# Patient Record
Sex: FEMALE | Race: WHITE | NOT HISPANIC OR LATINO | Employment: PART TIME | URBAN - METROPOLITAN AREA
[De-identification: names, ages, dates, MRNs, and addresses within clinical notes are randomized per-mention and may not be internally consistent; named-entity substitution may affect disease eponyms.]

---

## 2017-04-19 RX ORDER — HYDROCHLOROTHIAZIDE 12.5 MG/1
12.5 CAPSULE, GELATIN COATED ORAL DAILY
Qty: 90 CAPSULE | Refills: 0 | Status: SHIPPED | OUTPATIENT
Start: 2017-04-19 | End: 2017-10-29

## 2017-10-24 DIAGNOSIS — I10 ESSENTIAL HYPERTENSION: ICD-10-CM

## 2017-10-26 ENCOUNTER — TELEPHONE (OUTPATIENT)
Dept: FAMILY MEDICINE CLINIC | Facility: CLINIC | Age: 62
End: 2017-10-26

## 2017-10-26 RX ORDER — LISINOPRIL 20 MG/1
TABLET ORAL
Qty: 90 TABLET | Refills: 0 | OUTPATIENT
Start: 2017-10-26

## 2017-10-26 RX ORDER — HYDROCHLOROTHIAZIDE 12.5 MG/1
TABLET ORAL
Qty: 90 TABLET | Refills: 0 | OUTPATIENT
Start: 2017-10-26

## 2017-10-26 NOTE — TELEPHONE ENCOUNTER
----- Message from Jesus Farr MD sent at 10/26/2017 10:46 AM EDT -----  Contact: [phone]  RTC fasting     ----- Message -----     From: Whitney Avila MA     Sent: 10/26/2017   8:37 AM       To: Jesus Farr MD    Please advise  ----- Message -----     From: Tamika Millard     Sent: 10/25/2017   4:49 PM       To: Whitney Avila MA    LISINOPRIL,HYDROCLORITHYZIDE 90 DAYS REFILLS. LAST REFILLED AT [facility] AND JUST RAN OUT OF REFILLS         RA TC

## 2017-10-29 ENCOUNTER — OFFICE VISIT (OUTPATIENT)
Dept: FAMILY MEDICINE CLINIC | Facility: CLINIC | Age: 62
End: 2017-10-29

## 2017-10-29 VITALS
HEIGHT: 64 IN | WEIGHT: 189 LBS | BODY MASS INDEX: 32.27 KG/M2 | OXYGEN SATURATION: 98 % | HEART RATE: 91 BPM | TEMPERATURE: 98.4 F | RESPIRATION RATE: 20 BRPM | DIASTOLIC BLOOD PRESSURE: 78 MMHG | SYSTOLIC BLOOD PRESSURE: 144 MMHG

## 2017-10-29 DIAGNOSIS — Z12.31 SCREENING MAMMOGRAM, ENCOUNTER FOR: ICD-10-CM

## 2017-10-29 DIAGNOSIS — Z98.890 HISTORY OF KIDNEY SURGERY: ICD-10-CM

## 2017-10-29 DIAGNOSIS — Z23 INFLUENZA VACCINE NEEDED: ICD-10-CM

## 2017-10-29 DIAGNOSIS — I10 ESSENTIAL HYPERTENSION: ICD-10-CM

## 2017-10-29 DIAGNOSIS — F32.A DEPRESSION, UNSPECIFIED DEPRESSION TYPE: Primary | ICD-10-CM

## 2017-10-29 DIAGNOSIS — Z11.59 NEED FOR HEPATITIS C SCREENING TEST: ICD-10-CM

## 2017-10-29 LAB
ALBUMIN SERPL-MCNC: 4.4 G/DL (ref 3.2–4.8)
ALBUMIN/GLOB SERPL: 1.9 G/DL (ref 1.5–2.5)
ALP SERPL-CCNC: 86 U/L (ref 25–100)
ALT SERPL W P-5'-P-CCNC: 21 U/L (ref 7–40)
ANION GAP SERPL CALCULATED.3IONS-SCNC: 5 MMOL/L (ref 3–11)
AST SERPL-CCNC: 16 U/L (ref 0–33)
BASOPHILS # BLD AUTO: 0.03 10*3/MM3 (ref 0–0.2)
BASOPHILS NFR BLD AUTO: 0.4 % (ref 0–1)
BILIRUB BLD-MCNC: NEGATIVE MG/DL
BILIRUB SERPL-MCNC: 0.3 MG/DL (ref 0.3–1.2)
BUN BLD-MCNC: 16 MG/DL (ref 9–23)
BUN/CREAT SERPL: 16 (ref 7–25)
CALCIUM SPEC-SCNC: 9 MG/DL (ref 8.7–10.4)
CHLORIDE SERPL-SCNC: 110 MMOL/L (ref 99–109)
CLARITY, POC: CLEAR
CO2 SERPL-SCNC: 29 MMOL/L (ref 20–31)
COLOR UR: YELLOW
CREAT BLD-MCNC: 1 MG/DL (ref 0.6–1.3)
DEPRECATED RDW RBC AUTO: 43.7 FL (ref 37–54)
EOSINOPHIL # BLD AUTO: 0.12 10*3/MM3 (ref 0–0.3)
EOSINOPHIL NFR BLD AUTO: 1.8 % (ref 0–3)
ERYTHROCYTE [DISTWIDTH] IN BLOOD BY AUTOMATED COUNT: 13.2 % (ref 11.3–14.5)
EXPIRATION DATE: NORMAL
GFR SERPL CREATININE-BSD FRML MDRD: 56 ML/MIN/1.73
GLOBULIN UR ELPH-MCNC: 2.3 GM/DL
GLUCOSE BLD-MCNC: 124 MG/DL (ref 70–100)
GLUCOSE UR STRIP-MCNC: NEGATIVE MG/DL
HBA1C MFR BLD: 5.9 % (ref 4.8–5.6)
HCT VFR BLD AUTO: 38.4 % (ref 34.5–44)
HCV AB SER DONR QL: NORMAL
HGB BLD-MCNC: 12.7 G/DL (ref 11.5–15.5)
IMM GRANULOCYTES # BLD: 0.04 10*3/MM3 (ref 0–0.03)
IMM GRANULOCYTES NFR BLD: 0.6 % (ref 0–0.6)
KETONES UR QL: NEGATIVE
LEUKOCYTE EST, POC: NEGATIVE
LYMPHOCYTES # BLD AUTO: 1.36 10*3/MM3 (ref 0.6–4.8)
LYMPHOCYTES NFR BLD AUTO: 20.1 % (ref 24–44)
Lab: NORMAL
MCH RBC QN AUTO: 30.2 PG (ref 27–31)
MCHC RBC AUTO-ENTMCNC: 33.1 G/DL (ref 32–36)
MCV RBC AUTO: 91.2 FL (ref 80–99)
MONOCYTES # BLD AUTO: 0.51 10*3/MM3 (ref 0–1)
MONOCYTES NFR BLD AUTO: 7.5 % (ref 0–12)
NEUTROPHILS # BLD AUTO: 4.7 10*3/MM3 (ref 1.5–8.3)
NEUTROPHILS NFR BLD AUTO: 69.6 % (ref 41–71)
NITRITE UR-MCNC: NEGATIVE MG/ML
PH UR: 5.5 [PH] (ref 5–8)
PLATELET # BLD AUTO: 217 10*3/MM3 (ref 150–450)
PMV BLD AUTO: 10.9 FL (ref 6–12)
POTASSIUM BLD-SCNC: 4.3 MMOL/L (ref 3.5–5.5)
PROT SERPL-MCNC: 6.7 G/DL (ref 5.7–8.2)
PROT UR STRIP-MCNC: NEGATIVE MG/DL
RBC # BLD AUTO: 4.21 10*6/MM3 (ref 3.89–5.14)
RBC # UR STRIP: NEGATIVE /UL
SODIUM BLD-SCNC: 144 MMOL/L (ref 132–146)
SP GR UR: 1.01 (ref 1–1.03)
TSH SERPL DL<=0.05 MIU/L-ACNC: 2.43 MIU/ML (ref 0.35–5.35)
UROBILINOGEN UR QL: NORMAL
WBC NRBC COR # BLD: 6.76 10*3/MM3 (ref 3.5–10.8)

## 2017-10-29 PROCEDURE — 83036 HEMOGLOBIN GLYCOSYLATED A1C: CPT | Performed by: NURSE PRACTITIONER

## 2017-10-29 PROCEDURE — 90471 IMMUNIZATION ADMIN: CPT | Performed by: NURSE PRACTITIONER

## 2017-10-29 PROCEDURE — 80053 COMPREHEN METABOLIC PANEL: CPT | Performed by: NURSE PRACTITIONER

## 2017-10-29 PROCEDURE — 85025 COMPLETE CBC W/AUTO DIFF WBC: CPT | Performed by: NURSE PRACTITIONER

## 2017-10-29 PROCEDURE — 84443 ASSAY THYROID STIM HORMONE: CPT | Performed by: NURSE PRACTITIONER

## 2017-10-29 PROCEDURE — 86803 HEPATITIS C AB TEST: CPT | Performed by: NURSE PRACTITIONER

## 2017-10-29 PROCEDURE — 36415 COLL VENOUS BLD VENIPUNCTURE: CPT | Performed by: NURSE PRACTITIONER

## 2017-10-29 PROCEDURE — 99213 OFFICE O/P EST LOW 20 MIN: CPT | Performed by: NURSE PRACTITIONER

## 2017-10-29 PROCEDURE — 90686 IIV4 VACC NO PRSV 0.5 ML IM: CPT | Performed by: NURSE PRACTITIONER

## 2017-10-29 PROCEDURE — 81003 URINALYSIS AUTO W/O SCOPE: CPT | Performed by: NURSE PRACTITIONER

## 2017-10-29 RX ORDER — LISINOPRIL AND HYDROCHLOROTHIAZIDE 20; 12.5 MG/1; MG/1
1 TABLET ORAL DAILY
Qty: 90 TABLET | Refills: 2 | Status: SHIPPED | OUTPATIENT
Start: 2017-10-29 | End: 2017-11-29 | Stop reason: SDUPTHER

## 2017-10-29 NOTE — PROGRESS NOTES
Subjective   Lobito Mendoza is a 61 y.o. female.   Chief Complaint   Patient presents with   • Hypertension   • Med Refill     hydrochlorothiazide and lisinopril      History of Present Illness Need blood pressure medication refilled. She reports she has not seen a physician in a while.   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     The following portions of the patient's history were reviewed and updated as appropriate: allergies, current medications, past family history, past medical history, past social history, past surgical history and problem list.    Review of Systems   HENT: Positive for sore throat.    Eyes: Negative.         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    Cardiovascular: Negative.    Gastrointestinal: Positive for diarrhea. Negative for abdominal distention, abdominal pain, blood in stool, constipation, nausea and vomiting.        Gastric reflux taking 1/4 tablet of zantac 4 times a day.   If I take too much seems like I have diarrhea.      Endocrine: Negative.    Genitourinary: Negative.    Musculoskeletal: Positive for arthralgias. Negative for joint swelling and myalgias.        Right knee aches    Skin: Negative.    Allergic/Immunologic: Negative.    Neurological: Negative.    Hematological: Negative.    Psychiatric/Behavioral: Negative.        Objective   No Known Allergies    Physical Exam   Constitutional: She is oriented to person, place, and time. Vital signs are normal. She appears well-developed and well-nourished. She is cooperative. She does not appear ill.   HENT:   Head: Normocephalic.   Nose: Nose normal. Right sinus exhibits no maxillary sinus tenderness and no frontal sinus tenderness. Left sinus exhibits no maxillary sinus tenderness and no frontal sinus tenderness.   Mouth/Throat: Uvula is midline, oropharynx is clear and moist and mucous membranes are normal.   Eyes: Conjunctivae, EOM  "and lids are normal. Pupils are equal, round, and reactive to light. No scleral icterus.   Neck: Trachea normal and normal range of motion. Carotid bruit is not present. No thyroid mass and no thyromegaly present.   Cardiovascular: Normal rate, regular rhythm, S1 normal, S2 normal and normal heart sounds.    Pulses:       Carotid pulses are 2+ on the right side, and 2+ on the left side.       Radial pulses are 2+ on the right side, and 2+ on the left side.        Dorsalis pedis pulses are 2+ on the right side, and 2+ on the left side.        Posterior tibial pulses are 2+ on the right side, and 2+ on the left side.   Pulmonary/Chest: Effort normal and breath sounds normal.   Abdominal: Soft. Bowel sounds are normal. She exhibits no distension and no mass. There is no tenderness. There is no rebound and no guarding. No hernia.   Musculoskeletal: Normal range of motion.   Neurological: She is alert and oriented to person, place, and time.   Skin: Skin is warm, dry and intact.   Psychiatric: She has a normal mood and affect. Her behavior is normal. Judgment and thought content normal.     Visit Vitals   • /78 (BP Location: Right arm, Patient Position: Sitting, Cuff Size: Adult)   • Pulse 91   • Temp 98.4 °F (36.9 °C) (Oral)   • Resp 20   • Ht 64\" (162.6 cm)   • Wt 189 lb (85.7 kg)   • SpO2 98%   • BMI 32.44 kg/m2       Assessment/Plan   Lobito was seen today for hypertension and med refill.    Diagnoses and all orders for this visit:    Depression, unspecified depression type  -     CBC & Differential  -     POC Urinalysis Dipstick, Automated  -     TSH  -     Hemoglobin A1c  -     CBC Auto Differential    Essential hypertension  -     lisinopril-hydrochlorothiazide (ZESTORETIC) 20-12.5 MG per tablet; Take 1 tablet by mouth Daily for 90 days.  -     Comprehensive Metabolic Panel    Need for hepatitis C screening test  -     Hepatitis C Antibody    Influenza vaccine needed  -     Flu Vaccine Quad PF 3YR+ " (FLUARIX/FLUZONE [id number])    History of kidney surgery  -     Ambulatory Referral to Nephrology    Screening mammogram, encounter for  -     Mammo Screening Digital Tomosynthesis Bilateral With CAD; Future        See patient instructions.   Follow up in 4 weeks to review labs and as needed.                Cynthia Garzon, APRN

## 2017-10-29 NOTE — PATIENT INSTRUCTIONS
"Influenza (Flu) Vaccine (Inactivated or Recombinant):   1. Why get vaccinated?  Influenza (\"flu\") is a contagious disease that spreads around the [place] every year, usually between October and May.  Flu is caused by influenza viruses, and is spread mainly by coughing, sneezing, and close contact.  Anyone can get flu. Flu strikes suddenly and can last several days. Symptoms vary by age, but can include:  · fever/chills  · sore throat  · muscle aches  · fatigue  · cough  · headache  · runny or stuffy nose  Flu can also lead to pneumonia and blood infections, and cause diarrhea and seizures in children. If you have a medical condition, such as heart or lung disease, flu can make it worse.  Flu is more dangerous for some people. Infants and young children, people 65 years of age and older, pregnant women, and people with certain health conditions or a weakened immune system are at greatest risk.  Each year thousands of people in the [place] die from flu, and many more are hospitalized.  Flu vaccine can:  · keep you from getting flu,  · make flu less severe if you do get it, and  · keep you from spreading flu to your family and other people.  2. Inactivated and recombinant flu vaccines  A dose of flu vaccine is recommended every flu season. Children 6 months through 8 years of age may need two doses during the same flu season. Everyone else needs only one dose each flu season.  Some inactivated flu vaccines contain a very small amount of a mercury-based preservative called thimerosal. Studies have not shown thimerosal in vaccines to be harmful, but flu vaccines that do not contain thimerosal are available.  There is no live flu virus in flu shots. They cannot cause the flu.  There are many flu viruses, and they are always changing. Each year a new flu vaccine is made to protect against three or four viruses that are likely to cause disease in the upcoming flu season. But even when the vaccine doesn't exactly " match these viruses, it may still provide some protection.  Flu vaccine cannot prevent:  · flu that is caused by a virus not covered by the vaccine, or  · illnesses that look like flu but are not.  It takes about 2 weeks for protection to develop after vaccination, and protection lasts through the flu season.  3. Some people should not get this vaccine  Tell the person who is giving you the vaccine:  · If you have any severe, life-threatening allergies. If you ever had a life-threatening allergic reaction after a dose of flu vaccine, or have a severe allergy to any part of this vaccine, you may be advised not to get vaccinated. Most, but not all, types of flu vaccine contain a small amount of egg protein.  · If you ever had Guillain-[place] Syndrome (also called GBS). Some people with a history of GBS should not get this vaccine. This should be discussed with your doctor.  · If you are not feeling well. It is usually okay to get flu vaccine when you have a mild illness, but you might be asked to come back when you feel better.  4. Risks of a vaccine reaction  With any medicine, including vaccines, there is a chance of reactions. These are usually mild and go away on their own, but serious reactions are also possible.  Most people who get a flu shot do not have any problems with it.  Minor problems following a flu shot include:  · soreness, redness, or swelling where the shot was given  · hoarseness  · sore, red or itchy eyes  · cough  · fever  · aches  · headache  · itching  · fatigue  If these problems occur, they usually begin soon after the shot and last 1 or 2 days.  More serious problems following a flu shot can include the following:  · There may be a small increased risk of Guillain-[place] Syndrome (GBS) after inactivated flu vaccine. This risk has been estimated at 1 or 2 additional cases per million people vaccinated. This is much lower than the risk of severe complications from flu, which can be prevented by  flu vaccine.  · Young children who get the flu shot along with pneumococcal vaccine (PCV13) and/or DTaP vaccine at the same time might be slightly more likely to have a seizure caused by fever. Ask your doctor for more information. Tell your doctor if a child who is getting flu vaccine has ever had a seizure.  Problems that could happen after any injected vaccine:  · People sometimes faint after a medical procedure, including vaccination. Sitting or lying down for about 15 minutes can help prevent fainting, and injuries caused by a fall. Tell your doctor if you feel dizzy, or have vision changes or ringing in the ears.  · Some people get severe pain in the shoulder and have difficulty moving the arm where a shot was given. This happens very rarely.  · Any medication can cause a severe allergic reaction. Such reactions from a vaccine are very rare, estimated at about 1 in a million doses, and would happen within a few minutes to a few hours after the vaccination.  As with any medicine, there is a very remote chance of a vaccine causing a serious injury or death.  The safety of vaccines is always being monitored. For more information, visit: www.cdc.gov/vaccinesafety/  5. What if there is a serious reaction?  What should I look for?  · Look for anything that concerns you, such as signs of a severe allergic reaction, very high fever, or unusual behavior.  Signs of a severe allergic reaction can include hives, swelling of the face and throat, difficulty breathing, a fast heartbeat, dizziness, and weakness. These would start a few minutes to a few hours after the vaccination.  What should I do?  · If you think it is a severe allergic reaction or other emergency that can't wait, call 9-1-1 and get the person to the nearest hospital. Otherwise, call your doctor.  · Reactions should be reported to the Vaccine Adverse Event Reporting System (VAERS). Your doctor should file this report, or you can do it yourself through the  VAERS web site at www.vaers.[facility].gov, or by calling 1-469.452.2288.  VAERS does not give medical advice.  6. The National Vaccine Injury Compensation Program  The National Vaccine Injury Compensation Program (VICP) is a federal program that was created to compensate people who may have been injured by certain vaccines.  Persons who believe they may have been injured by a vaccine can learn about the program and about filing a claim by calling 1-204.115.8921 or visiting the VICP website at www.[facility].gov/vaccinecompensation. There is a time limit to file a claim for compensation.  7. How can I learn more?  · Ask your healthcare provider. He or she can give you the vaccine package insert or suggest other sources of information.  · Call your local or state health department.  · Contact the Centers for Disease Control and Prevention (CDC):    Call 1-163.176.2102 (7-896-WYV-INFO) or    Visit CDC's website at www.cdc.gov/flu  Vaccine Information Statement Inactivated Influenza Vaccine (08/07/2015)     This information is not intended to replace advice given to you by your health care provider. Make sure you discuss any questions you have with your health care provider.     Document Released: 10/12/2007 Document Revised: 01/08/2016 Document Reviewed: 08/10/2015  [employer] Interactive Patient Education ©2017 [employer]

## 2017-11-29 ENCOUNTER — OFFICE VISIT (OUTPATIENT)
Dept: FAMILY MEDICINE CLINIC | Facility: CLINIC | Age: 62
End: 2017-11-29

## 2017-11-29 VITALS
HEIGHT: 64 IN | WEIGHT: 186 LBS | SYSTOLIC BLOOD PRESSURE: 120 MMHG | BODY MASS INDEX: 31.76 KG/M2 | RESPIRATION RATE: 20 BRPM | OXYGEN SATURATION: 96 % | DIASTOLIC BLOOD PRESSURE: 80 MMHG | TEMPERATURE: 98.8 F | HEART RATE: 81 BPM

## 2017-11-29 DIAGNOSIS — Z13.220 SCREENING, LIPID: Primary | ICD-10-CM

## 2017-11-29 DIAGNOSIS — I10 ESSENTIAL HYPERTENSION: ICD-10-CM

## 2017-11-29 DIAGNOSIS — Z76.0 MEDICATION REFILL: ICD-10-CM

## 2017-11-29 DIAGNOSIS — F32.A DEPRESSION, UNSPECIFIED DEPRESSION TYPE: ICD-10-CM

## 2017-11-29 DIAGNOSIS — N89.8 VAGINAL DISCHARGE: ICD-10-CM

## 2017-11-29 DIAGNOSIS — Z12.4 PAP SMEAR FOR CERVICAL CANCER SCREENING: ICD-10-CM

## 2017-11-29 PROCEDURE — 99214 OFFICE O/P EST MOD 30 MIN: CPT | Performed by: NURSE PRACTITIONER

## 2017-11-29 RX ORDER — BUPROPION HCL 300 MG
300 TABLET, EXTENDED RELEASE 24 HR ORAL EVERY MORNING
Qty: 90 TABLET | Refills: 3
Start: 2017-11-29 | End: 2019-12-24 | Stop reason: SDUPTHER

## 2017-11-29 RX ORDER — ASPIRIN 81 MG/1
81 TABLET, CHEWABLE ORAL DAILY
Qty: 30 TABLET | Refills: 6 | Status: SHIPPED | OUTPATIENT
Start: 2017-11-29 | End: 2017-12-29

## 2017-11-29 RX ORDER — LISINOPRIL AND HYDROCHLOROTHIAZIDE 20; 12.5 MG/1; MG/1
1 TABLET ORAL DAILY
Qty: 90 TABLET | Refills: 2 | Status: SHIPPED | OUTPATIENT
Start: 2017-11-29 | End: 2018-10-16 | Stop reason: SDUPTHER

## 2017-11-29 NOTE — PATIENT INSTRUCTIONS
Hypertension  Hypertension, commonly called high blood pressure, is when the force of blood pumping through your arteries is too strong. Your arteries are the blood vessels that carry blood from your heart throughout your body. A blood pressure reading consists of a higher number over a lower number, such as 110/72. The higher number (systolic) is the pressure inside your arteries when your heart pumps. The lower number (diastolic) is the pressure inside your arteries when your heart relaxes. Ideally you want your blood pressure below 120/80.  Hypertension forces your heart to work harder to pump blood. Your arteries may become narrow or stiff. Having untreated or uncontrolled hypertension can cause heart attack, stroke, kidney disease, and other problems.  RISK FACTORS  Some risk factors for high blood pressure are controllable. Others are not.   Risk factors you cannot control include:   · Race. You may be at higher risk if you are .  · Age. Risk increases with age.  · Gender. Men are at higher risk than women before age 45 years. After age 65, women are at higher risk than men.  Risk factors you can control include:  · Not getting enough exercise or physical activity.  · Being overweight.  · Getting too much fat, sugar, calories, or salt in your diet.  · Drinking too much alcohol.  SIGNS AND SYMPTOMS  Hypertension does not usually cause signs or symptoms. Extremely high blood pressure (hypertensive crisis) may cause headache, anxiety, shortness of breath, and nosebleed.  DIAGNOSIS  To check if you have hypertension, your health care provider will measure your blood pressure while you are seated, with your arm held at the level of your heart. It should be measured at least twice using the same arm. Certain conditions can cause a difference in blood pressure between your right and left arms. A blood pressure reading that is higher than normal on one occasion does not mean that you need treatment. If  it is not clear whether you have high blood pressure, you may be asked to return on a different day to have your blood pressure checked again. Or, you may be asked to monitor your blood pressure at home for 1 or more weeks.  TREATMENT  Treating high blood pressure includes making lifestyle changes and possibly taking medicine. Living a healthy lifestyle can help lower high blood pressure. You may need to change some of your habits.  Lifestyle changes may include:  · Following the DASH diet. This diet is high in fruits, vegetables, and whole grains. It is low in salt, red meat, and added sugars.  · Keep your sodium intake below 2,300 mg per day.  · Getting at least 30-45 minutes of aerobic exercise at least 4 times per week.  · Losing weight if necessary.  · Not smoking.  · Limiting alcoholic beverages.  · Learning ways to reduce stress.  Your health care provider may prescribe medicine if lifestyle changes are not enough to get your blood pressure under control, and if one of the following is true:  · You are 18-59 years of age and your systolic blood pressure is above 140.  · You are 60 years of age or older, and your systolic blood pressure is above 150.  · Your diastolic blood pressure is above 90.  · You have diabetes, and your systolic blood pressure is over 140 or your diastolic blood pressure is over 90.  · You have kidney disease and your blood pressure is above 140/90.  · You have heart disease and your blood pressure is above 140/90.  Your personal target blood pressure may vary depending on your medical conditions, your age, and other factors.  HOME CARE INSTRUCTIONS  · Have your blood pressure rechecked as directed by your health care provider.    · Take medicines only as directed by your health care provider. Follow the directions carefully. Blood pressure medicines must be taken as prescribed. The medicine does not work as well when you skip doses. Skipping doses also puts you at risk for  problems.  · Do not smoke.    · Monitor your blood pressure at home as directed by your health care provider.   SEEK MEDICAL CARE IF:   · You think you are having a reaction to medicines taken.  · You have recurrent headaches or feel dizzy.  · You have swelling in your ankles.  · You have trouble with your vision.  SEEK IMMEDIATE MEDICAL CARE IF:  · You develop a severe headache or confusion.  · You have unusual weakness, numbness, or feel faint.  · You have severe chest or abdominal pain.  · You vomit repeatedly.  · You have trouble breathing.  MAKE SURE YOU:   · Understand these instructions.  · Will watch your condition.  · Will get help right away if you are not doing well or get worse.     This information is not intended to replace advice given to you by your health care provider. Make sure you discuss any questions you have with your health care provider.     Document Released: 12/18/2006 Document Revised: 05/03/2016 Document Reviewed: 10/10/2014  [employer] Interactive Patient Education ©2017 [employer]  Diabetes Mellitus and Food  It is important for you to manage your blood sugar (glucose) level. Your blood glucose level can be greatly affected by what you eat. Eating healthier foods in the appropriate amounts throughout the day at about the same time each day will help you control your blood glucose level. It can also help slow or prevent worsening of your diabetes mellitus. Healthy eating may even help you improve the level of your blood pressure and reach or maintain a healthy weight.   General recommendations for healthful eating and cooking habits include:  · Eating meals and snacks regularly. Avoid going long periods of time without eating to lose weight.  · Eating a diet that consists mainly of plant-based foods, such as fruits, vegetables, nuts, legumes, and whole grains.  · Using low-heat cooking methods, such as baking, instead of high-heat cooking methods, such as deep frying.  Work with your  dietitian to make sure you understand how to use the Nutrition Facts information on food labels.  HOW CAN FOOD AFFECT ME?  Carbohydrates  Carbohydrates affect your blood glucose level more than any other type of food. Your dietitian will help you determine how many carbohydrates to eat at each meal and teach you how to count carbohydrates. Counting carbohydrates is important to keep your blood glucose at a healthy level, especially if you are using insulin or taking certain medicines for diabetes mellitus.  Alcohol  Alcohol can cause sudden decreases in blood glucose (hypoglycemia), especially if you use insulin or take certain medicines for diabetes mellitus. Hypoglycemia can be a life-threatening condition. Symptoms of hypoglycemia (sleepiness, dizziness, and disorientation) are similar to symptoms of having too much alcohol.   If your health care provider has given you approval to drink alcohol, do so in moderation and use the following guidelines:  · Women should not have more than one drink per day, and men should not have more than two drinks per day. One drink is equal to:    12 oz of beer.    5 oz of wine.    1½ oz of hard liquor.  · Do not drink on an empty stomach.  · Keep yourself hydrated. Have water, diet soda, or unsweetened iced tea.  · Regular soda, juice, and other mixers might contain a lot of carbohydrates and should be counted.  WHAT FOODS ARE NOT RECOMMENDED?  As you make food choices, it is important to remember that all foods are not the same. Some foods have fewer nutrients per serving than other foods, even though they might have the same number of calories or carbohydrates. It is difficult to get your body what it needs when you eat foods with fewer nutrients. Examples of foods that you should avoid that are high in calories and carbohydrates but low in nutrients include:  · Trans fats (most processed foods list trans fats on the Nutrition Facts label).  · Regular  soda.  · Juice.  · Candy.  · Sweets, such as cake, pie, doughnuts, and cookies.  · Fried foods.  WHAT FOODS CAN I EAT?  Eat nutrient-rich foods, which will nourish your body and keep you healthy. The food you should eat also will depend on several factors, including:  · The calories you need.  · The medicines you take.  · Your weight.  · Your blood glucose level.  · Your blood pressure level.  · Your cholesterol level.  You should eat a variety of foods, including:  · Protein.    Lean cuts of meat.    Proteins low in saturated fats, such as fish, egg whites, and beans. Avoid processed meats.  · Fruits and vegetables.    Fruits and vegetables that may help control blood glucose levels, such as apples, mangoes, and yams.  · Dairy products.    Choose fat-free or low-fat dairy products, such as milk, yogurt, and cheese.  · Grains, bread, pasta, and rice.    Choose whole grain products, such as multigrain bread, whole oats, and brown rice. These foods may help control blood pressure.  · Fats.    Foods containing healthful fats, such as nuts, avocado, olive oil, canola oil, and fish.  DOES EVERYONE WITH DIABETES MELLITUS HAVE THE SAME MEAL PLAN?  Because every person with diabetes mellitus is different, there is not one meal plan that works for everyone. It is very important that you meet with a dietitian who will help you create a meal plan that is just right for you.     This information is not intended to replace advice given to you by your health care provider. Make sure you discuss any questions you have with your health care provider.     Document Released: 09/14/2006 Document Revised: 01/08/2016 Document Reviewed: 11/14/2014  [employer] Interactive Patient Education ©2017 [employer]

## 2017-11-29 NOTE — PROGRESS NOTES
"Israel Mendoza is a 61 y.o. female.   Chief Complaint   Patient presents with   • Hypertension      History of Present Illness   Patient is here for follow up on blood pressure for a fasting lipid profile drawn and a PAP smear.     The following portions of the patient's history were reviewed and updated as appropriate: allergies, current medications, past family history, past medical history, past social history, past surgical history and problem list.    Review of Systems   HENT: Negative.    Eyes: Negative.    Cardiovascular: Negative.    Gastrointestinal: Negative.    Genitourinary: Negative for dysuria, frequency, pelvic pain, urgency, vaginal bleeding, vaginal discharge and vaginal pain.   Skin: Negative.    Neurological: Negative.        Objective   No Known Allergies  Visit Vitals   • /80 (BP Location: Left arm, Patient Position: Sitting, Cuff Size: Adult)   • Pulse 81   • Temp 98.8 °F (37.1 °C) (Oral)   • Resp 20   • Ht 64\" (162.6 cm)   • Wt 186 lb (84.4 kg)   • SpO2 96%   • BMI 31.93 kg/m2       Physical Exam   Constitutional: She is oriented to person, place, and time. She appears well-developed and well-nourished.   HENT:   Head: Normocephalic.   Eyes: Pupils are equal, round, and reactive to light.   Neck: Normal range of motion.   Cardiovascular: Normal rate, regular rhythm and normal heart sounds.    Pulmonary/Chest: Effort normal and breath sounds normal. Chest wall is not dull to percussion. She exhibits no mass, no tenderness, no bony tenderness, no laceration, no crepitus, no edema, no deformity, no swelling and no retraction. Right breast exhibits no inverted nipple, no mass, no nipple discharge, no skin change and no tenderness. Left breast exhibits no inverted nipple, no mass, no nipple discharge, no skin change and no tenderness.   Abdominal: Soft.   Genitourinary: No labial fusion. There is no rash, tenderness, lesion or injury on the right labia. There is no rash, " tenderness, lesion or injury on the left labia. Cervix exhibits discharge. Cervix exhibits no motion tenderness and no friability. Right adnexum displays no mass and no tenderness. Left adnexum displays no mass, no tenderness and no fullness. No erythema in the vagina. Vaginal discharge found.   Genitourinary Comments: Chaperone: Bianka Mullins MA  Moderate amount of white vaginal discharge.   Patient tolerated procedure well.   Patient has a history of LEEP procedure   Musculoskeletal: Normal range of motion.   Lymphadenopathy:     She has no cervical adenopathy.   Neurological: She is alert and oriented to person, place, and time.   Skin: Skin is warm, dry and intact.   Psychiatric: She has a normal mood and affect. Her behavior is normal.   Vitals reviewed.      Assessment/Plan   Lobito was seen today for hypertension.    Diagnoses and all orders for this visit:    Screening, lipid  -     Lipid Panel  -     Liquid-based Pap Smear, Screening - ThinPrep Vial, Cervix    Essential hypertension  -     lisinopril-hydrochlorothiazide (ZESTORETIC) 20-12.5 MG per tablet; Take 1 tablet by mouth Daily for 90 days.    Pap smear for cervical cancer screening  -     Liquid-based Pap Smear, Screening - ThinPrep Vial, Cervix    Depression  -     WELLBUTRIN  MG 24 hr tablet; Take 1 tablet by mouth Every Morning.    Medication refill  -     aspirin 81 MG chewable tablet; Chew 1 tablet Daily for 30 days.    Vaginal discharge               See patient instructions for low carb diet  Diet 3 times a week at lesat 30 minutes.   Eat a heart heathy diet - low fat, low sodium and low cholesterol.   Follow up in 6 months - recheck labs for Hgb A1c and for blood pressure.         JESIKA Alcaraz

## 2017-12-30 ENCOUNTER — TELEPHONE (OUTPATIENT)
Dept: FAMILY MEDICINE CLINIC | Facility: CLINIC | Age: 62
End: 2017-12-30

## 2017-12-30 NOTE — TELEPHONE ENCOUNTER
--Called and spoke with patient.   Answered questions.   --- Message from Kacie Colon MA sent at 12/30/2017  2:45 PM EST -----  Regarding: FW: pap results  Contact: [phone]  This pt called for pap results, I seen report that said it was negative, Please advise?  ----- Message -----     From: Sherrie George     Sent: 12/29/2017   1:41 PM       To: Kacie Colon MA, Little Mullins MA  Subject: pap results                                      Pt has questions regarding pap results

## 2018-02-28 ENCOUNTER — HOSPITAL ENCOUNTER (OUTPATIENT)
Dept: MAMMOGRAPHY | Facility: HOSPITAL | Age: 63
Discharge: HOME | End: 2018-02-28
Admitting: FAMILY MEDICINE

## 2018-02-28 DIAGNOSIS — Z12.31 SCREENING MAMMOGRAM, ENCOUNTER FOR: ICD-10-CM

## 2018-02-28 PROCEDURE — 77067 SCR MAMMO BI INCL CAD: CPT

## 2018-02-28 PROCEDURE — 77063 BREAST TOMOSYNTHESIS BI: CPT

## 2018-02-28 PROCEDURE — 77063 BREAST TOMOSYNTHESIS BI: CPT | Performed by: RADIOLOGY

## 2018-02-28 PROCEDURE — 77067 SCR MAMMO BI INCL CAD: CPT | Performed by: RADIOLOGY

## 2018-05-11 ENCOUNTER — TELEPHONE (OUTPATIENT)
Dept: FAMILY MEDICINE CLINIC | Facility: CLINIC | Age: 63
End: 2018-05-11

## 2018-05-11 NOTE — TELEPHONE ENCOUNTER
Pt notified  ----- Message from Sarah Gann sent at 5/11/2018  9:18 AM EDT -----  Contact: [phone]  Will you let the patient know that I will submit an adjustment request.  It will take about 6wks to get a response.    I will copy you for future status checks.   Thanks,   Sarah MENCHACA   ----- Message -----  From: Enedelia Carrillo MA  Sent: 5/1/2018   2:48 PM  To: Sarah Gann    I talked to her and let her know that in Cynthia's note it shows that there was discharge present on exam, which is why she went ahead and ordered the panel. She states she was never informed of the discharge, or informed that an additional test was going to be ran.    ----- Message -----  From: Sarah Gann  Sent: 4/26/2018   3:41 PM  To: CALEB Richard   Ms. Mendoza left a message on my voicemail regarding a follow-up from the conversation last week.  She stated she talked to someone who was checking on some testing that she wasn't aware the provider ordered.  I didn't talk to the patient but I remember hearing/reading about her call.    Let's talk about this one when we have a chance.     Thanks,   Sarah MENCHACA

## 2018-10-16 DIAGNOSIS — I10 ESSENTIAL HYPERTENSION: ICD-10-CM

## 2018-10-16 RX ORDER — LISINOPRIL AND HYDROCHLOROTHIAZIDE 20; 12.5 MG/1; MG/1
TABLET ORAL
Qty: 90 TABLET | Refills: 0 | Status: SHIPPED | OUTPATIENT
Start: 2018-10-16 | End: 2018-10-31 | Stop reason: SDUPTHER

## 2018-10-16 NOTE — TELEPHONE ENCOUNTER
lisinopril-hydrochlorothiazide (PRINZIDE,ZESTORETIC) 20-12.5 MG per tablet   take 1 tablet by mouth once daily   Normal, Disp-90 tablet, R-0     Sent to [facility]

## 2018-10-31 ENCOUNTER — OFFICE VISIT (OUTPATIENT)
Dept: FAMILY MEDICINE CLINIC | Facility: CLINIC | Age: 63
End: 2018-10-31

## 2018-10-31 VITALS
DIASTOLIC BLOOD PRESSURE: 88 MMHG | BODY MASS INDEX: 30.35 KG/M2 | OXYGEN SATURATION: 97 % | WEIGHT: 177.8 LBS | HEIGHT: 64 IN | SYSTOLIC BLOOD PRESSURE: 134 MMHG | TEMPERATURE: 98.6 F | HEART RATE: 72 BPM

## 2018-10-31 DIAGNOSIS — G56.01 CARPAL TUNNEL SYNDROME OF RIGHT WRIST: ICD-10-CM

## 2018-10-31 DIAGNOSIS — F32.89 OTHER DEPRESSION: ICD-10-CM

## 2018-10-31 DIAGNOSIS — I10 ESSENTIAL HYPERTENSION: Primary | ICD-10-CM

## 2018-10-31 PROBLEM — F32.A DEPRESSION: Status: ACTIVE | Noted: 2018-10-31

## 2018-10-31 PROBLEM — R73.03 PREDIABETES: Status: ACTIVE | Noted: 2018-10-31

## 2018-10-31 PROCEDURE — 99214 OFFICE O/P EST MOD 30 MIN: CPT | Performed by: FAMILY MEDICINE

## 2018-10-31 RX ORDER — LISINOPRIL AND HYDROCHLOROTHIAZIDE 20; 12.5 MG/1; MG/1
1 TABLET ORAL DAILY
Qty: 90 TABLET | Refills: 3 | Status: SHIPPED | OUTPATIENT
Start: 2018-10-31 | End: 2019-01-21 | Stop reason: SDUPTHER

## 2018-10-31 NOTE — PROGRESS NOTES
Subjective   Lobito Mendoza is a 62 y.o. female.   pt is here to fu on htn and discuss changing to providers to Dr Paul. Refill needed on lisinopril hctz 90 day supply.    Hypertension   This is a chronic problem. The current episode started more than 1 year ago. The problem has been gradually improving since onset. The problem is controlled. Pertinent negatives include no anxiety, blurred vision, chest pain, headaches, malaise/fatigue, neck pain, orthopnea, palpitations, peripheral edema, shortness of breath or sweats. There are no associated agents to hypertension. Risk factors for coronary artery disease include post-menopausal state. Past treatments include ACE inhibitors and diuretics. Current antihypertension treatment includes ACE inhibitors and diuretics. The current treatment provides moderate improvement. There are no compliance problems.  There is no history of angina or kidney disease. Identifiable causes of hypertension include sleep apnea. There is no history of a thyroid problem.      Depression controlled.  Seeing psych Dr Reynaldo Diaz.      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  She does not have wrist pain.  The numbness doesn't not spread.  She does occasionally wake up in the morning with numbness of the hand.  She has not tried anything to alleviate the symptoms.  Driving and sleeping make the numbness worse.  Not using the hand makes the numbness better.  It has been an ongoing problem for 2 months.  No injury to the wrist.  No changes in her hair skin or nails to indicate a thyroid abnormality.    The following portions of the patient's history were reviewed and updated as appropriate: allergies, current medications, past family history, past medical history, past social history, past surgical history and problem list.    Review of Systems   Constitutional:  "Negative.  Negative for malaise/fatigue.   HENT: Negative.    Eyes: Negative.  Negative for blurred vision.   Respiratory: Negative.  Negative for shortness of breath.    Cardiovascular: Negative.  Negative for chest pain, palpitations and orthopnea.   Gastrointestinal: Negative.    Endocrine: Negative.    Genitourinary: Negative.    Musculoskeletal: Negative.  Negative for neck pain.   Skin: Negative.    Allergic/Immunologic: Negative.    Neurological: Negative.  Negative for headaches.   Hematological: Negative.    Psychiatric/Behavioral: Negative.    All other systems reviewed and are negative.      Objective     Vitals:    10/31/18 1443   BP: 134/88   Pulse: 72   Temp: 98.6 °F (37 °C)   SpO2: 97%   Weight: 80.6 kg (177 lb 12.8 oz)   Height: 162.6 cm (64\")       Physical Exam   Constitutional: She is oriented to person, place, and time. She appears well-developed and well-nourished.   HENT:   Head: Normocephalic and atraumatic.   Eyes: Pupils are equal, round, and reactive to light. EOM are normal. Right eye exhibits no discharge. Left eye exhibits no discharge.   Neck: Normal range of motion. Neck supple.   Cardiovascular: Normal rate, regular rhythm, normal heart sounds and intact distal pulses.    Pulmonary/Chest: Effort normal and breath sounds normal.   Abdominal: Soft. Bowel sounds are normal. She exhibits no mass. There is no tenderness.   Musculoskeletal: Normal range of motion. She exhibits no edema, tenderness or deformity.        Right shoulder: She exhibits no swelling.   Negative Tinel sign.  Positive Phalen's.   Neurological: She is alert and oriented to person, place, and time. She has normal reflexes.   Skin: Skin is warm and dry. No cyanosis. Nails show no clubbing.   Psychiatric: She has a normal mood and affect. Her behavior is normal. Judgment and thought content normal.       Assessment/Plan     Problem List Items Addressed This Visit        Cardiovascular and Mediastinum    HTN " (hypertension) - Primary    Current Assessment & Plan     Hypertension is unchanged.  Continue current treatment regimen.  Dietary sodium restriction.  Weight loss.  Continue current medications.  Blood pressure will be reassessed at the next regular appointment.         Relevant Medications    lisinopril-hydrochlorothiazide (PRINZIDE,ZESTORETIC) 20-12.5 MG per tablet    Other Relevant Orders    CBC & Differential    TSH    Comprehensive Metabolic Panel    Hemoglobin A1c    Lipid Panel    Vitamin D 25 Hydroxy       Nervous and Auditory    Carpal tunnel syndrome of right wrist    Current Assessment & Plan     New problem. Discussed prevention.  Night splint today.  Notify if sx not improved.                Other    Depression    Overview     Controlled.  Followed by psych.              right wrist Splint was given to the patient today in the office.

## 2018-10-31 NOTE — ASSESSMENT & PLAN NOTE
Hypertension is unchanged.  Continue current treatment regimen.  Dietary sodium restriction.  Weight loss.  Continue current medications.  Blood pressure will be reassessed at the next regular appointment.

## 2018-12-31 ENCOUNTER — OFFICE VISIT (OUTPATIENT)
Dept: FAMILY MEDICINE CLINIC | Facility: CLINIC | Age: 63
End: 2018-12-31

## 2018-12-31 VITALS
HEART RATE: 74 BPM | DIASTOLIC BLOOD PRESSURE: 84 MMHG | TEMPERATURE: 98.2 F | WEIGHT: 180.4 LBS | HEIGHT: 64 IN | BODY MASS INDEX: 30.8 KG/M2 | OXYGEN SATURATION: 98 % | SYSTOLIC BLOOD PRESSURE: 116 MMHG

## 2018-12-31 DIAGNOSIS — I10 ESSENTIAL HYPERTENSION: ICD-10-CM

## 2018-12-31 DIAGNOSIS — R73.03 PREDIABETES: Primary | ICD-10-CM

## 2018-12-31 PROCEDURE — 99213 OFFICE O/P EST LOW 20 MIN: CPT | Performed by: FAMILY MEDICINE

## 2018-12-31 PROCEDURE — 36415 COLL VENOUS BLD VENIPUNCTURE: CPT | Performed by: FAMILY MEDICINE

## 2018-12-31 NOTE — PROGRESS NOTES
"Subjective   Lobito Mendoza is a 63 y.o. female.     Pt is here to fu on htn.    History of Present Illness she is fasting today for fasting labs.  Is here to follow-up on hypertension.  She is tolerating her medications well without any side effects.  Her blood pressure has been much better controlled.  No chest pain no shortness of breath.  She is fasting today for labs.  She expresses no new concerns or needs today.  She does not need refills today.    The following portions of the patient's history were reviewed and updated as appropriate: allergies, current medications, past family history, past medical history, past social history, past surgical history and problem list.    Review of Systems   Constitutional: Negative.    HENT: Negative.    Eyes: Negative.    Respiratory: Negative.    Cardiovascular: Negative.    Gastrointestinal: Negative.    Endocrine: Negative.    Genitourinary: Negative.    Musculoskeletal: Negative.    Skin: Negative.    Allergic/Immunologic: Negative.    Neurological: Negative.    Hematological: Negative.    Psychiatric/Behavioral: Negative.    All other systems reviewed and are negative.      Objective     Vitals:    12/31/18 1409   BP: 116/84   Pulse: 74   Temp: 98.2 °F (36.8 °C)   SpO2: 98%   Weight: 81.8 kg (180 lb 6.4 oz)   Height: 162.6 cm (64\")       Physical Exam   Constitutional: She is oriented to person, place, and time. She appears well-developed and well-nourished.   HENT:   Head: Normocephalic and atraumatic.   Eyes: EOM are normal. Pupils are equal, round, and reactive to light. Right eye exhibits no discharge. Left eye exhibits no discharge.   Neck: Normal range of motion. Neck supple.   Cardiovascular: Normal rate, regular rhythm, normal heart sounds and intact distal pulses.   Pulmonary/Chest: Effort normal and breath sounds normal.   Abdominal: Soft. Bowel sounds are normal. She exhibits no mass. There is no tenderness.   Musculoskeletal: Normal range of motion.        " Right shoulder: She exhibits no swelling.   Neurological: She is alert and oriented to person, place, and time. She has normal reflexes.   Skin: Skin is warm and dry. No cyanosis. Nails show no clubbing.   Psychiatric: She has a normal mood and affect. Her behavior is normal. Judgment and thought content normal.   Nursing note and vitals reviewed.      Assessment/Plan     Problem List Items Addressed This Visit        Cardiovascular and Mediastinum    HTN (hypertension)    Relevant Orders    CBC & Differential    TSH    Comprehensive Metabolic Panel    Lipid Panel    Vitamin D 25 Hydroxy    Vitamin B12    Hepatitis C Antibody    CBC Auto Differential       Other    Prediabetes - Primary    Relevant Orders    Hemoglobin A1c

## 2019-01-03 LAB
25(OH)D3+25(OH)D2 SERPL-MCNC: 15 NG/ML (ref 30–100)
ALBUMIN SERPL-MCNC: 4.5 G/DL (ref 3.6–4.8)
ALBUMIN/GLOB SERPL: 2 {RATIO} (ref 1.2–2.2)
ALP SERPL-CCNC: 75 IU/L (ref 39–117)
ALT SERPL-CCNC: 19 IU/L (ref 0–32)
AST SERPL-CCNC: 18 IU/L (ref 0–40)
BASOPHILS # BLD AUTO: 0 X10E3/UL (ref 0–0.2)
BASOPHILS NFR BLD AUTO: 0 %
BILIRUB SERPL-MCNC: 0.4 MG/DL (ref 0–1.2)
BUN SERPL-MCNC: 16 MG/DL (ref 8–27)
BUN/CREAT SERPL: 17 (ref 12–28)
CALCIUM SERPL-MCNC: 9 MG/DL (ref 8.7–10.3)
CHLORIDE SERPL-SCNC: 105 MMOL/L (ref 96–106)
CHOLEST SERPL-MCNC: 162 MG/DL (ref 100–199)
CO2 SERPL-SCNC: 22 MMOL/L (ref 20–29)
CREAT SERPL-MCNC: 0.94 MG/DL (ref 0.57–1)
EOSINOPHIL # BLD AUTO: 0.1 X10E3/UL (ref 0–0.4)
EOSINOPHIL NFR BLD AUTO: 2 %
ERYTHROCYTE [DISTWIDTH] IN BLOOD BY AUTOMATED COUNT: 13.5 % (ref 12.3–15.4)
GLOBULIN SER CALC-MCNC: 2.3 G/DL (ref 1.5–4.5)
GLUCOSE SERPL-MCNC: 93 MG/DL (ref 65–99)
HBA1C MFR BLD: 5.9 % (ref 4.8–5.6)
HCT VFR BLD AUTO: 35.6 % (ref 34–46.6)
HCV AB S/CO SERPL IA: <0.1 S/CO RATIO (ref 0–0.9)
HDLC SERPL-MCNC: 39 MG/DL
HGB BLD-MCNC: 12 G/DL (ref 11.1–15.9)
IMM GRANULOCYTES # BLD: 0 X10E3/UL (ref 0–0.1)
IMM GRANULOCYTES NFR BLD: 0 %
LDLC SERPL CALC-MCNC: 80 MG/DL (ref 0–99)
LYMPHOCYTES # BLD AUTO: 1.8 X10E3/UL (ref 0.7–3.1)
LYMPHOCYTES NFR BLD AUTO: 28 %
MCH RBC QN AUTO: 29.6 PG (ref 26.6–33)
MCHC RBC AUTO-ENTMCNC: 33.7 G/DL (ref 31.5–35.7)
MCV RBC AUTO: 88 FL (ref 79–97)
MONOCYTES # BLD AUTO: 0.5 X10E3/UL (ref 0.1–0.9)
MONOCYTES NFR BLD AUTO: 7 %
NEUTROPHILS # BLD AUTO: 4.1 X10E3/UL (ref 1.4–7)
NEUTROPHILS NFR BLD AUTO: 63 %
PLATELET # BLD AUTO: 205 X10E3/UL (ref 150–379)
POTASSIUM SERPL-SCNC: 3.9 MMOL/L (ref 3.5–5.2)
PROT SERPL-MCNC: 6.8 G/DL (ref 6–8.5)
RBC # BLD AUTO: 4.05 X10E6/UL (ref 3.77–5.28)
SODIUM SERPL-SCNC: 145 MMOL/L (ref 134–144)
TRIGL SERPL-MCNC: 217 MG/DL (ref 0–149)
TSH SERPL DL<=0.005 MIU/L-ACNC: 3.7 UIU/ML (ref 0.45–4.5)
VIT B12 SERPL-MCNC: 595 PG/ML (ref 232–1245)
VLDLC SERPL CALC-MCNC: 43 MG/DL (ref 5–40)
WBC # BLD AUTO: 6.5 X10E3/UL (ref 3.4–10.8)

## 2019-01-21 DIAGNOSIS — I10 ESSENTIAL HYPERTENSION: ICD-10-CM

## 2019-01-21 RX ORDER — LISINOPRIL AND HYDROCHLOROTHIAZIDE 20; 12.5 MG/1; MG/1
1 TABLET ORAL DAILY
Qty: 90 TABLET | Refills: 3 | Status: SHIPPED | OUTPATIENT
Start: 2019-01-21 | End: 2019-07-01 | Stop reason: SDUPTHER

## 2019-02-04 ENCOUNTER — TELEPHONE (OUTPATIENT)
Dept: FAMILY MEDICINE CLINIC | Facility: CLINIC | Age: 64
End: 2019-02-04

## 2019-02-04 NOTE — TELEPHONE ENCOUNTER
PT NOTIFIED OF LAB RESULTS. WILL  COPY IN OFFICE IF SHE DOES NOT RECEIVE IN MAIL WITHIN NEXT WEEK.  ----- Message from Fredrick Graf sent at 2/4/2019 10:21 AM EST -----  Regarding: BLOOD WORK RESULTS  Contact: [phone]  PT WOULD LIKE TO HEAR RESULTS OF HER TESTS SHE HAD DONE IN DEC, PLEASE CALL

## 2019-07-01 ENCOUNTER — OFFICE VISIT (OUTPATIENT)
Dept: FAMILY MEDICINE CLINIC | Facility: CLINIC | Age: 64
End: 2019-07-01

## 2019-07-01 VITALS
HEIGHT: 64 IN | SYSTOLIC BLOOD PRESSURE: 138 MMHG | TEMPERATURE: 97.4 F | WEIGHT: 193 LBS | BODY MASS INDEX: 32.95 KG/M2 | DIASTOLIC BLOOD PRESSURE: 88 MMHG | OXYGEN SATURATION: 98 % | HEART RATE: 101 BPM

## 2019-07-01 DIAGNOSIS — I10 ESSENTIAL HYPERTENSION: Primary | ICD-10-CM

## 2019-07-01 DIAGNOSIS — F32.89 OTHER DEPRESSION: ICD-10-CM

## 2019-07-01 PROCEDURE — 99213 OFFICE O/P EST LOW 20 MIN: CPT | Performed by: FAMILY MEDICINE

## 2019-07-01 RX ORDER — ASPIRIN 81 MG/1
81 TABLET ORAL DAILY
COMMUNITY
End: 2019-07-01 | Stop reason: SDUPTHER

## 2019-07-01 RX ORDER — LISINOPRIL AND HYDROCHLOROTHIAZIDE 20; 12.5 MG/1; MG/1
1 TABLET ORAL DAILY
Qty: 90 TABLET | Refills: 3 | Status: SHIPPED | OUTPATIENT
Start: 2019-07-01 | End: 2020-01-13 | Stop reason: SDUPTHER

## 2019-07-01 RX ORDER — ASPIRIN 81 MG/1
81 TABLET ORAL DAILY
Qty: 90 TABLET | Refills: 3 | Status: SHIPPED | OUTPATIENT
Start: 2019-07-01 | End: 2020-01-13 | Stop reason: SDUPTHER

## 2019-07-01 NOTE — PROGRESS NOTES
"Subjective   Lobito Mendoza is a 63 y.o. female.     Pt is he re for fu on htn and refills on medication.    Hypertension   This is a recurrent problem. The current episode started more than 1 year ago. The problem has been gradually improving since onset. The problem is controlled. Pertinent negatives include no anxiety, blurred vision, chest pain, headaches, malaise/fatigue, peripheral edema or shortness of breath. There are no associated agents to hypertension. Risk factors for coronary artery disease include obesity and post-menopausal state. Past treatments include ACE inhibitors and diuretics. Current antihypertension treatment includes ACE inhibitors and diuretics. The current treatment provides moderate improvement. There are no compliance problems.  There is no history of angina or kidney disease. There is no history of chronic renal disease or sleep apnea.        The following portions of the patient's history were reviewed and updated as appropriate: allergies, current medications, past family history, past medical history, past social history, past surgical history and problem list.    Review of Systems   Constitutional: Negative for malaise/fatigue.   Eyes: Negative for blurred vision.   Respiratory: Negative for shortness of breath.    Cardiovascular: Negative for chest pain.   Neurological: Negative for headaches.       Objective     Vitals:    07/01/19 1426   BP: 138/88   Pulse: 101   Temp: 97.4 °F (36.3 °C)   SpO2: 98%   Weight: 87.5 kg (193 lb)   Height: 162.6 cm (64\")       Physical Exam   Constitutional: She is oriented to person, place, and time. She appears well-developed and well-nourished.   HENT:   Head: Normocephalic and atraumatic.   Eyes: EOM are normal. Pupils are equal, round, and reactive to light. Right eye exhibits no discharge. Left eye exhibits no discharge.   Neck: Normal range of motion. Neck supple.   Cardiovascular: Normal rate, regular rhythm, normal heart sounds and intact " distal pulses.   Pulmonary/Chest: Effort normal and breath sounds normal.   Abdominal: Soft. Bowel sounds are normal. She exhibits no mass. There is no tenderness.   Musculoskeletal: Normal range of motion.        Right shoulder: She exhibits no swelling.   Neurological: She is alert and oriented to person, place, and time. She has normal reflexes.   Skin: Skin is warm and dry. No cyanosis. Nails show no clubbing.   Psychiatric: She has a normal mood and affect. Her behavior is normal. Judgment and thought content normal.   Nursing note and vitals reviewed.      Assessment/Plan     Problem List Items Addressed This Visit        Cardiovascular and Mediastinum    HTN (hypertension) - Primary    Relevant Medications    lisinopril-hydrochlorothiazide (PRINZIDE,ZESTORETIC) 20-12.5 MG per tablet    aspirin 81 MG EC tablet       Other    Depression    Overview     Controlled.  Followed by psych.

## 2019-12-23 ENCOUNTER — TELEPHONE (OUTPATIENT)
Dept: FAMILY MEDICINE CLINIC | Facility: CLINIC | Age: 64
End: 2019-12-23

## 2019-12-23 NOTE — TELEPHONE ENCOUNTER
PT STATES NEEDS A REFILL ON BUPROPION 300MG LONG ACTING  HER PSYCHIATRIST IS OUT UNTIL 1/6/2020 SHE HAS 4 DAYS AND NEEDS REFILL    CONFIRMED [facility]

## 2019-12-23 NOTE — TELEPHONE ENCOUNTER
Left patient voicemail advising Beverly is out as well, advised she would be the only one who could approve this.

## 2019-12-24 ENCOUNTER — OFFICE VISIT (OUTPATIENT)
Dept: FAMILY MEDICINE CLINIC | Facility: CLINIC | Age: 64
End: 2019-12-24

## 2019-12-24 VITALS
HEART RATE: 86 BPM | BODY MASS INDEX: 32.06 KG/M2 | RESPIRATION RATE: 18 BRPM | OXYGEN SATURATION: 99 % | TEMPERATURE: 98.1 F | SYSTOLIC BLOOD PRESSURE: 124 MMHG | WEIGHT: 187.8 LBS | DIASTOLIC BLOOD PRESSURE: 62 MMHG | HEIGHT: 64 IN

## 2019-12-24 DIAGNOSIS — F32.A DEPRESSION, UNSPECIFIED DEPRESSION TYPE: ICD-10-CM

## 2019-12-24 PROCEDURE — 99213 OFFICE O/P EST LOW 20 MIN: CPT | Performed by: NURSE PRACTITIONER

## 2019-12-24 RX ORDER — BUPROPION HYDROCHLORIDE 300 MG/1
300 TABLET ORAL DAILY
Qty: 30 TABLET | Refills: 2 | Status: SHIPPED | OUTPATIENT
Start: 2019-12-24

## 2019-12-24 RX ORDER — RANITIDINE 150 MG/1
CAPSULE ORAL EVERY 12 HOURS SCHEDULED
COMMUNITY
End: 2020-01-13 | Stop reason: ALTCHOICE

## 2019-12-24 RX ORDER — HYDROCHLOROTHIAZIDE 12.5 MG/1
CAPSULE, GELATIN COATED ORAL
COMMUNITY
End: 2020-01-13 | Stop reason: SDUPTHER

## 2019-12-24 RX ORDER — ASPIRIN 81 MG/1
TABLET ORAL
COMMUNITY
End: 2020-01-13

## 2019-12-24 RX ORDER — LISINOPRIL 20 MG/1
TABLET ORAL
COMMUNITY
End: 2020-01-13 | Stop reason: SDUPTHER

## 2019-12-24 RX ORDER — BUPROPION HYDROCHLORIDE 300 MG/1
TABLET ORAL
COMMUNITY
End: 2019-12-24 | Stop reason: SDUPTHER

## 2019-12-24 NOTE — PATIENT INSTRUCTIONS
Living With Depression  Everyone experiences occasional disappointment, sadness, and loss in their lives. When you are feeling down, blue, or sad for at least 2 weeks in a row, it may mean that you have depression. Depression can affect your thoughts and feelings, relationships, daily activities, and physical health. It is caused by changes in the way your brain functions. If you receive a diagnosis of depression, your health care provider will tell you which type of depression you have and what treatment options are available to you.  If you are living with depression, there are ways to help you recover from it and also ways to prevent it from coming back.  How to cope with lifestyle changes  Coping with stress         Stress is your body’s reaction to life changes and events, both good and bad. Stressful situations may include:  · Getting .  · The death of a spouse.  · Losing a job.  · Retiring.  · Having a baby.  Stress can last just a few hours or it can be ongoing. Stress can play a major role in depression, so it is important to learn both how to cope with stress and how to think about it differently.  Talk with your health care provider or a counselor if you would like to learn more about stress reduction. He or she may suggest some stress reduction techniques, such as:  · Music therapy. This can include creating music or listening to music. Choose music that you enjoy and that inspires you.  · Mindfulness-based meditation. This kind of meditation can be done while sitting or walking. It involves being aware of your normal breaths, rather than trying to control your breathing.  · Centering prayer. This is a kind of meditation that involves focusing on a spiritual word or phrase. Choose a word, phrase, or sacred image that is meaningful to you and that brings you peace.  · Deep breathing. To do this, expand your stomach and inhale slowly through your nose. Hold your breath for 3-5 seconds, then exhale  slowly, allowing your stomach muscles to relax.  · Muscle relaxation. This involves intentionally tensing muscles then relaxing them.  Choose a stress reduction technique that fits your lifestyle and personality. Stress reduction techniques take time and practice to develop. Set aside 5-15 minutes a day to do them. Therapists can offer training in these techniques. The training may be covered by some insurance plans. Other things you can do to manage stress include:  · Keeping a stress diary. This can help you learn what triggers your stress and ways to control your response.  · Understanding what your limits are and saying no to requests or events that lead to a schedule that is too full.  · Thinking about how you respond to certain situations. You may not be able to control everything, but you can control how you react.  · Adding humor to your life by watching funny films or TV shows.  · Making time for activities that help you relax and not feeling guilty about spending your time this way.    Medicines  Your health care provider may suggest certain medicines if he or she feels that they will help improve your condition. Avoid using alcohol and other substances that may prevent your medicines from working properly (may interact). It is also important to:  · Talk with your pharmacist or health care provider about all the medicines that you take, their possible side effects, and what medicines are safe to take together.  · Make it your goal to take part in all treatment decisions (shared decision-making). This includes giving input on the side effects of medicines. It is best if shared decision-making with your health care provider is part of your total treatment plan.  If your health care provider prescribes a medicine, you may not notice the full benefits of it for 4-8 weeks. Most people who are treated for depression need to be on medicine for at least 6-12 months after they feel better. If you are taking  medicines as part of your treatment, do not stop taking medicines without first talking to your health care provider. You may need to have the medicine slowly decreased (tapered) over time to decrease the risk of harmful side effects.  Relationships  Your health care provider may suggest family therapy along with individual therapy and drug therapy. While there may not be family problems that are causing you to feel depressed, it is still important to make sure your family learns as much as they can about your mental health. Having your family’s support can help make your treatment successful.  How to recognize changes in your condition  Everyone has a different response to treatment for depression. Recovery from major depression happens when you have not had signs of major depression for two months. This may mean that you will start to:  · Have more interest in doing activities.  · Feel less hopeless than you did 2 months ago.  · Have more energy.  · Overeat less often, or have better or improving appetite.  · Have better concentration.  Your health care provider will work with you to decide the next steps in your recovery. It is also important to recognize when your condition is getting worse. Watch for these signs:  · Having fatigue or low energy.  · Eating too much or too little.  · Sleeping too much or too little.  · Feeling restless, agitated, or hopeless.  · Having trouble concentrating or making decisions.  · Having unexplained physical complaints.  · Feeling irritable, angry, or aggressive.  Get help as soon as you or your family members notice these symptoms coming back.  How to get support and help from others  How to talk with friends and family members about your condition    Talking to friends and family members about your condition can provide you with one way to get support and guidance. Reach out to trusted friends or family members, explain your symptoms to them, and let them know that you are  working with a health care provider to treat your depression.  Financial resources  Not all insurance plans cover mental health care, so it is important to check with your insurance carrier. If paying for co-pays or counseling services is a problem, search for a local or [facility] mental health care center. They may be able to offer public mental health care services at low or no cost when you are not able to see a private health care provider.  If you are taking medicine for depression, you may be able to get the generic form, which may be less expensive. Some makers of prescription medicines also offer help to patients who cannot afford the medicines they need.  Follow these instructions at home:    · Get the right amount and quality of sleep.  · Cut down on using caffeine, tobacco, alcohol, and other potentially harmful substances.  · Try to exercise, such as walking or lifting small weights.  · Take over-the-counter and prescription medicines only as told by your health care provider.  · Eat a healthy diet that includes plenty of vegetables, fruits, whole grains, low-fat dairy products, and lean protein. Do not eat a lot of foods that are high in solid fats, added sugars, or salt.  · Keep all follow-up visits as told by your health care provider. This is important.  Contact a health care provider if:  · You stop taking your antidepressant medicines, and you have any of these symptoms:  ? Nausea.  ? Headache.  ? Feeling lightheaded.  ? Chills and body aches.  ? Not being able to sleep (insomnia).  · You or your friends and family think your depression is getting worse.  Get help right away if:  · You have thoughts of hurting yourself or others.  If you ever feel like you may hurt yourself or others, or have thoughts about taking your own life, get help right away. You can go to your nearest emergency department or call:  · Your local emergency services (911 in the [place]  · A suicide crisis helpline, such as the  National Suicide Prevention Lifeline at 1-258.607.1488. This is open 24-hours a day.  Summary  · If you are living with depression, there are ways to help you recover from it and also ways to prevent it from coming back.  · Work with your health care team to create a management plan that includes counseling, stress management techniques, and healthy lifestyle habits.  This information is not intended to replace advice given to you by your health care provider. Make sure you discuss any questions you have with your health care provider.  Document Released: 11/20/2017 Document Revised: 11/20/2017 Document Reviewed: 11/20/2017  [employer] Interactive Patient Education © 2019 [employer]  Bupropion extended-release tablets (Depression/Mood Disorders)  What is this medicine?  BUPROPION (byoo PROE pee on) is used to treat depression.  This medicine may be used for other purposes; ask your health care provider or pharmacist if you have questions.  COMMON BRAND NAME(S): Aplenzin, Budeprion XL, Forfivo XL, Wellbutrin XL  What should I tell my health care provider before I take this medicine?  They need to know if you have any of these conditions:  -an eating disorder, such as anorexia or bulimia  -bipolar disorder or psychosis  -diabetes or high blood sugar, treated with medication  -glaucoma  -head injury or brain tumor  -heart disease, previous heart attack, or irregular heart beat  -high blood pressure  -kidney or liver disease  -seizures (convulsions)  -suicidal thoughts or a previous suicide attempt  -Tourette's syndrome  -weight loss  -an unusual or allergic reaction to bupropion, other medicines, foods, dyes, or preservatives  -breast-feeding  -pregnant or trying to become pregnant  How should I use this medicine?  Take this medicine by mouth with a glass of water. Follow the directions on the prescription label. You can take it with or without food. If it upsets your stomach, take it with food. Do not crush, chew, or  cut these tablets. This medicine is taken once daily at the same time each day. Do not take your medicine more often than directed. Do not stop taking this medicine suddenly except upon the advice of your doctor. Stopping this medicine too quickly may cause serious side effects or your condition may worsen.  A special MedGuide will be given to you by the pharmacist with each prescription and refill. Be sure to read this information carefully each time.  Talk to your pediatrician regarding the use of this medicine in children. Special care may be needed.  Overdosage: If you think you have taken too much of this medicine contact a poison control center or emergency room at once.  NOTE: This medicine is only for you. Do not share this medicine with others.  What if I miss a dose?  If you miss a dose, skip the missed dose and take your next tablet at the regular time. Do not take double or extra doses.  What may interact with this medicine?  Do not take this medicine with any of the following medications:  -linezolid  -MAOIs like Azilect, Carbex, Eldepryl, Marplan, Nardil, and Parnate  -methylene blue (injected into a vein)  -other medicines that contain bupropion like Zyban  This medicine may also interact with the following medications:  -alcohol  -certain medicines for anxiety or sleep  -certain medicines for blood pressure like metoprolol, propranolol  -certain medicines for depression or psychotic disturbances  -certain medicines for HIV or AIDS like efavirenz, lopinavir, nelfinavir, ritonavir  -certain medicines for irregular heart beat like propafenone, flecainide  -certain medicines for Parkinson's disease like amantadine, levodopa  -certain medicines for seizures like carbamazepine, phenytoin, phenobarbital  -cimetidine  -clopidogrel  -cyclophosphamide  -digoxin  -furazolidone  -isoniazid  -nicotine  -orphenadrine  -procarbazine  -steroid medicines like prednisone or cortisone  -stimulant medicines for  attention disorders, weight loss, or to stay awake  -tamoxifen  -theophylline  -thiotepa  -ticlopidine  -tramadol  -warfarin  This list may not describe all possible interactions. Give your health care provider a list of all the medicines, herbs, non-prescription drugs, or dietary supplements you use. Also tell them if you smoke, drink alcohol, or use illegal drugs. Some items may interact with your medicine.  What should I watch for while using this medicine?  Tell your doctor if your symptoms do not get better or if they get worse. Visit your doctor or health care professional for regular checks on your progress. Because it may take several weeks to see the full effects of this medicine, it is important to continue your treatment as prescribed by your doctor.  Patients and their families should watch out for new or worsening thoughts of suicide or depression. Also watch out for sudden changes in feelings such as feeling anxious, agitated, panicky, irritable, hostile, aggressive, impulsive, severely restless, overly excited and hyperactive, or not being able to sleep. If this happens, especially at the beginning of treatment or after a change in dose, call your health care professional.  Avoid alcoholic drinks while taking this medicine. Drinking large amounts of alcoholic beverages, using sleeping or anxiety medicines, or quickly stopping the use of these agents while taking this medicine may increase your risk for a seizure.  Do not drive or use heavy machinery until you know how this medicine affects you. This medicine can impair your ability to perform these tasks.  Do not take this medicine close to bedtime. It may prevent you from sleeping.  Your mouth may get dry. Chewing sugarless gum or sucking hard candy, and drinking plenty of water may help. Contact your doctor if the problem does not go away or is severe.  The tablet shell for some brands of this medicine does not dissolve. This is normal. The tablet  shell may appear whole in the stool. This is not a cause for concern.  What side effects may I notice from receiving this medicine?  Side effects that you should report to your doctor or health care professional as soon as possible:  -allergic reactions like skin rash, itching or hives, swelling of the face, lips, or tongue  -breathing problems  -changes in vision  -confusion  -elevated mood, decreased need for sleep, racing thoughts, impulsive behavior  -fast or irregular heartbeat  -hallucinations, loss of contact with reality  -increased blood pressure  -redness, blistering, peeling or loosening of the skin, including inside the mouth  -seizures  -suicidal thoughts or other mood changes  -unusually weak or tired  -vomiting  Side effects that usually do not require medical attention (report to your doctor or health care professional if they continue or are bothersome):  -constipation  -headache  -loss of appetite  -nausea  -tremors  -weight loss  This list may not describe all possible side effects. Call your doctor for medical advice about side effects. You may report side effects to FDA at 2-514-FDA-0508.  Where should I keep my medicine?  Keep out of the reach of children.  Store at room temperature between 15 and 30 degrees C (59 and 86 degrees F). Throw away any unused medicine after the expiration date.  NOTE: This sheet is a summary. It may not cover all possible information. If you have questions about this medicine, talk to your doctor, pharmacist, or health care provider.  © 2019 [employer] (2017-06-09 13:55:13)

## 2019-12-24 NOTE — PROGRESS NOTES
Subjective   Lobito Mendoza is a 64 y.o. female.     Ms. Mendoza presents today for refill on her Wellbutrin. Patient states that she normally sees a psychiatrist who prescribes this medication for her, but he is out of town for the Christmas Holiday and will not be back until January 6th-she states that she only has two days left of her medication. Patient states that her depression is well-controlled on Wellbutrin and she does not want to run out of this medicine.       The following portions of the patient's history were reviewed and updated as appropriate: allergies, current medications, past family history, past medical history, past social history, past surgical history and problem list.    Allergies as of 12/24/2019   • (No Known Allergies)       Current Outpatient Medications on File Prior to Visit   Medication Sig   • aspirin 81 MG EC tablet Take 1 tablet by mouth Daily.   • lisinopril-hydrochlorothiazide (PRINZIDE,ZESTORETIC) 20-12.5 MG per tablet Take 1 tablet by mouth Daily.   • [DISCONTINUED] WELLBUTRIN  MG 24 hr tablet Take 1 tablet by mouth Every Morning.     No current facility-administered medications on file prior to visit.        Review of Systems   Constitutional: Negative.    Respiratory: Negative.    Cardiovascular: Negative.    Psychiatric/Behavioral:        History of depression       Objective   Physical Exam   Constitutional: She is oriented to person, place, and time. Vital signs are normal. She appears well-developed and well-nourished. She is cooperative. She does not appear ill. No distress.   HENT:   Right Ear: Tympanic membrane normal.   Left Ear: Tympanic membrane normal.   Mouth/Throat: Uvula is midline and mucous membranes are normal.   Cardiovascular: Normal rate, regular rhythm and normal heart sounds.   Pulmonary/Chest: Effort normal and breath sounds normal.   Neurological: She is alert and oriented to person, place, and time.   Skin: Skin is warm, dry and intact. No rash  noted. She is not diaphoretic.   Psychiatric: She has a normal mood and affect. Her speech is normal and behavior is normal. Judgment and thought content normal. She is not actively hallucinating. Cognition and memory are normal. She is attentive.   Vitals reviewed.    Vitals:    12/24/19 0756   BP: 124/62   Pulse: 86   Resp: 18   Temp: 98.1 °F (36.7 °C)   SpO2: 99%     Body mass index is 32.24 kg/m².    Assessment/Plan   Lobito was seen today for med refill.    Diagnoses and all orders for this visit:    Depression  -     buPROPion XL (WELLBUTRIN XL) 300 MG 24 hr tablet; Take 1 tablet by mouth Daily.     Discussed the nature of the medical condition(s) risks, complications, implications, management, safe and proper use of medications. Encouraged medication compliance, and keeping scheduled follow up appointments with me and any other providers.     F/U with psychiatry.

## 2020-01-13 ENCOUNTER — OFFICE VISIT (OUTPATIENT)
Dept: FAMILY MEDICINE CLINIC | Facility: CLINIC | Age: 65
End: 2020-01-13

## 2020-01-13 VITALS
HEART RATE: 68 BPM | HEIGHT: 64 IN | OXYGEN SATURATION: 98 % | SYSTOLIC BLOOD PRESSURE: 134 MMHG | TEMPERATURE: 97.5 F | BODY MASS INDEX: 32.1 KG/M2 | DIASTOLIC BLOOD PRESSURE: 82 MMHG | WEIGHT: 188 LBS

## 2020-01-13 DIAGNOSIS — Z00.00 ANNUAL PHYSICAL EXAM: Primary | ICD-10-CM

## 2020-01-13 DIAGNOSIS — Z78.0 POST-MENOPAUSAL: ICD-10-CM

## 2020-01-13 DIAGNOSIS — R73.03 PREDIABETES: ICD-10-CM

## 2020-01-13 DIAGNOSIS — I10 ESSENTIAL HYPERTENSION: ICD-10-CM

## 2020-01-13 DIAGNOSIS — K21.9 GASTROESOPHAGEAL REFLUX DISEASE WITHOUT ESOPHAGITIS: ICD-10-CM

## 2020-01-13 DIAGNOSIS — Z23 NEED FOR DIPHTHERIA-TETANUS-PERTUSSIS (TDAP) VACCINE: ICD-10-CM

## 2020-01-13 DIAGNOSIS — F32.89 OTHER DEPRESSION: ICD-10-CM

## 2020-01-13 DIAGNOSIS — F32.A DEPRESSION, UNSPECIFIED DEPRESSION TYPE: ICD-10-CM

## 2020-01-13 LAB
BILIRUB BLD-MCNC: NEGATIVE MG/DL
CLARITY, POC: CLEAR
COLOR UR: YELLOW
EXPIRATION DATE: NORMAL
GLUCOSE UR STRIP-MCNC: NEGATIVE MG/DL
KETONES UR QL: NEGATIVE
LEUKOCYTE EST, POC: NEGATIVE
Lab: NORMAL
NITRITE UR-MCNC: NEGATIVE MG/ML
PH UR: 6 [PH] (ref 5–8)
PROT UR STRIP-MCNC: NEGATIVE MG/DL
RBC # UR STRIP: NEGATIVE /UL
SP GR UR: 1.02 (ref 1–1.03)
UROBILINOGEN UR QL: NORMAL

## 2020-01-13 PROCEDURE — 90471 IMMUNIZATION ADMIN: CPT | Performed by: FAMILY MEDICINE

## 2020-01-13 PROCEDURE — 90715 TDAP VACCINE 7 YRS/> IM: CPT | Performed by: FAMILY MEDICINE

## 2020-01-13 PROCEDURE — 81003 URINALYSIS AUTO W/O SCOPE: CPT | Performed by: FAMILY MEDICINE

## 2020-01-13 PROCEDURE — 36415 COLL VENOUS BLD VENIPUNCTURE: CPT | Performed by: FAMILY MEDICINE

## 2020-01-13 PROCEDURE — 99396 PREV VISIT EST AGE 40-64: CPT | Performed by: FAMILY MEDICINE

## 2020-01-13 RX ORDER — ASPIRIN 81 MG/1
81 TABLET ORAL DAILY
Qty: 90 TABLET | Refills: 3 | Status: SHIPPED | OUTPATIENT
Start: 2020-01-13 | End: 2021-03-22

## 2020-01-13 RX ORDER — LISINOPRIL AND HYDROCHLOROTHIAZIDE 20; 12.5 MG/1; MG/1
1 TABLET ORAL DAILY
Qty: 90 TABLET | Refills: 3 | Status: SHIPPED | OUTPATIENT
Start: 2020-01-13 | End: 2020-03-10

## 2020-01-13 RX ORDER — OMEPRAZOLE 20 MG/1
20 CAPSULE, DELAYED RELEASE ORAL DAILY
Qty: 90 CAPSULE | Refills: 3 | Status: SHIPPED | OUTPATIENT
Start: 2020-01-13 | End: 2020-01-29

## 2020-01-13 RX ORDER — FAMOTIDINE 20 MG/1
20 TABLET, FILM COATED ORAL 2 TIMES DAILY
COMMUNITY
End: 2020-01-13

## 2020-01-13 NOTE — PROGRESS NOTES
Subjective   Lobito Mendoza is a 64 y.o. female and is here for a comprehensive physical exam. The patient reports no problems. Patient reports last physical date of over one year ago.    Last pap over 2 years ago.  Colonoscopy 3-4 years ago.  Pt is fasting today  Declines flu shot.    Do you take any herbs or supplements that were not prescribed by a doctor? no  Are you taking calcium supplements? no  Are you taking aspirin daily? no  Family history of ovarian cancer? no  Family history of breast cancer? no  FH of endometrial cancer? no  FH of cervical cancer? no  FH of colon cancer? no    Cancer Screening  Mammogram up-to-date?  no  If yes, last exam date: 2018  BMD up-to-date? yes  If yes, last exam date: more than 10 years ago  Colonoscopy up-to-date? yes   If yes, last exam date: 2 years ago  Pap up-to-date? yes   If yes, last exam date: 2 years ago     History:  LMP: No LMP recorded. Patient is postmenopausal.  Menopause at 54 years  Last pap date: 2 years  Abnormal pap? no  : 3  Para: 3    Immunization History  Tdap? no  HPV? not applicable  Pneumonia? no  Shingles? yes-needs second shingles    The following portions of the patient's history were reviewed and updated as appropriate: allergies, current medications, past family history, past medical history, past social history, past surgical history and problem list.    Past Medical History:   Diagnosis Date   • Depression    • Esophageal ring    • GERD (gastroesophageal reflux disease)    • H/O LEEP    • Hearing loss in left ear    • HTN (hypertension)    • Kidney stones    • ANDREIA (obstructive sleep apnea)    • Prediabetes        Family History   Problem Relation Age of Onset   • Atrial fibrillation Mother    • Hypertension Mother    • Heart disease Father    • Diabetes Father    • Breast cancer Paternal Aunt    • Ovarian cancer Neg Hx        Past Surgical History:   Procedure Laterality Date   • BREAST BIOPSY Right    • CHOLECYSTECTOMY      • COLONOSCOPY  2011   • ESOPHAGEAL DILATATION  2009   • NEPHRECTOMY PARTIAL Right 2014   • UPPER GASTROINTESTINAL ENDOSCOPY  2011       Social History     Socioeconomic History   • Marital status:      Spouse name: Not on file   • Number of children: 4   • Years of education: Not on file   • Highest education level: Not on file   Occupational History   • Occupation:    Tobacco Use   • Smoking status: Never Smoker   • Smokeless tobacco: Never Used   Substance and Sexual Activity   • Alcohol use: No   • Drug use: No   • Sexual activity: Yes     Partners: Male     Comment:  for 41 years       Review of Systems  Do you have pain that bothers you in your daily life? yes- right knee  Review of Systems   Constitutional: Negative.    HENT: Negative.    Eyes: Negative.    Respiratory: Negative.    Cardiovascular: Negative.    Gastrointestinal: Negative.    Endocrine: Negative.    Genitourinary: Negative.    Musculoskeletal: Negative.    Skin: Negative.    Allergic/Immunologic: Negative.    Neurological: Negative.    Hematological: Negative.    Psychiatric/Behavioral: Negative.    All other systems reviewed and are negative.      Objective   Physical Exam   Constitutional: She is oriented to person, place, and time. She appears well-developed and well-nourished.   HENT:   Head: Normocephalic and atraumatic.   Eyes: Pupils are equal, round, and reactive to light. EOM are normal. Right eye exhibits no discharge. Left eye exhibits no discharge.   Neck: Normal range of motion. Neck supple.   Cardiovascular: Normal rate, regular rhythm, normal heart sounds and intact distal pulses.   Pulmonary/Chest: Effort normal and breath sounds normal.   Abdominal: Soft. Bowel sounds are normal. She exhibits no mass. There is no tenderness.   Musculoskeletal: Normal range of motion.        Right shoulder: She exhibits no swelling.   Neurological: She is alert and oriented to person, place, and time. She has normal  reflexes.   Skin: Skin is warm and dry. No cyanosis. Nails show no clubbing.   Psychiatric: She has a normal mood and affect. Her behavior is normal. Judgment and thought content normal.   Nursing note and vitals reviewed.       Assessment/Plan   Healthy female exam.      1.   Problem List Items Addressed This Visit        Cardiovascular and Mediastinum    HTN (hypertension)    Relevant Medications    aspirin 81 MG EC tablet    lisinopril-hydrochlorothiazide (PRINZIDE,ZESTORETIC) 20-12.5 MG per tablet       Digestive    Gastroesophageal reflux disease without esophagitis    Relevant Medications    omeprazole (priLOSEC) 20 MG capsule       Genitourinary    Post-menopausal    Relevant Orders    DEXA Bone Density Axial       Other    Depression    Overview     Controlled.  Followed by psych.            Prediabetes    Annual physical exam - Primary    Relevant Orders    CBC & Differential    TSH    Comprehensive Metabolic Panel    Hemoglobin A1c    Lipid Panel    Vitamin D 25 Hydroxy    Vitamin B12    POC Urinalysis Dipstick, Automated    Need for diphtheria-tetanus-pertussis (Tdap) vaccine    Relevant Orders    Tdap Vaccine Greater Than or Equal To 8yo IM            2. Patient Counseling:  --Nutrition: Stressed importance of moderation in sodium/caffeine intake, saturated fat and cholesterol, caloric balance, sufficient intake of fresh fruits, vegetables, fiber, calcium, iron, and 1 mg of folate supplement per day (for females capable of pregnancy).  --Discussed the issue of estrogen replacement, calcium supplement, and the daily use of baby aspirin.  --Exercise: Stressed the importance of regular exercise.   --Substance Abuse: Discussed cessation/primary prevention of tobacco, alcohol, or other drug use; driving or other dangerous activities under the influence; availability of treatment for abuse.    --Sexuality: Discussed sexually transmitted diseases, partner selection, use of condoms, avoidance of unintended  pregnancy  and contraceptive alternatives.   --Injury prevention: Discussed safety belts, safety helmets, smoke detector, smoking near bedding or upholstery.   --Dental health: Discussed importance of regular tooth brushing, flossing, and dental visits.  --Immunizations reviewed.  --Discussed benefits of screening colonoscopy.  --After hours service discussed with patient    3. Discussed the patient's BMI with her.  The BMI is above average; no BMI management plan is appropriate  4. Follow up in one year      Pt. Would like tetanus shot today      Continue asa 81 daily.  Try enteric coated asa.    omeprazole rx  Sent to pharm instead of pepcid.

## 2020-01-13 NOTE — PATIENT INSTRUCTIONS
Preventive Care 40-64 Years Old, Female  Preventive care refers to visits with your health care provider and lifestyle choices that can promote health and wellness. This includes:  · A yearly physical exam. This may also be called an annual well check.  · Regular dental visits and eye exams.  · Immunizations.  · Screening for certain conditions.  · Healthy lifestyle choices, such as eating a healthy diet, getting regular exercise, not using drugs or products that contain nicotine and tobacco, and limiting alcohol use.  What can I expect for my preventive care visit?  Physical exam  Your health care provider will check your:  · Height and weight. This may be used to calculate body mass index (BMI), which tells if you are at a healthy weight.  · Heart rate and blood pressure.  · Skin for abnormal spots.  Counseling  Your health care provider may ask you questions about your:  · Alcohol, tobacco, and drug use.  · Emotional well-being.  · Home and relationship well-being.  · Sexual activity.  · Eating habits.  · Work and work environment.  · Method of birth control.  · Menstrual cycle.  · Pregnancy history.  What immunizations do I need?    Influenza (flu) vaccine  · This is recommended every year.  Tetanus, diphtheria, and pertussis (Tdap) vaccine  · You may need a Td booster every 10 years.  Varicella (chickenpox) vaccine  · You may need this if you have not been vaccinated.  Zoster (shingles) vaccine  · You may need this after age 60.  Measles, mumps, and rubella (MMR) vaccine  · You may need at least one dose of MMR if you were born in 1957 or later. You may also need a second dose.  Pneumococcal conjugate (PCV13) vaccine  · You may need this if you have certain conditions and were not previously vaccinated.  Pneumococcal polysaccharide (PPSV23) vaccine  · You may need one or two doses if you smoke cigarettes or if you have certain conditions.  Meningococcal conjugate (MenACWY) vaccine  · You may need this if you  have certain conditions.  Hepatitis A vaccine  · You may need this if you have certain conditions or if you travel or work in places where you may be exposed to hepatitis A.  Hepatitis B vaccine  · You may need this if you have certain conditions or if you travel or work in places where you may be exposed to hepatitis B.  Haemophilus influenzae type b (Hib) vaccine  · You may need this if you have certain conditions.  Human papillomavirus (HPV) vaccine  · If recommended by your health care provider, you may need three doses over 6 months.  You may receive vaccines as individual doses or as more than one vaccine together in one shot (combination vaccines). Talk with your health care provider about the risks and benefits of combination vaccines.  What tests do I need?  Blood tests  · Lipid and cholesterol levels. These may be checked every 5 years, or more frequently if you are over 50 years old.  · Hepatitis C test.  · Hepatitis B test.  Screening  · Lung cancer screening. You may have this screening every year starting at age 55 if you have a 30-pack-year history of smoking and currently smoke or have quit within the past 15 years.  · Colorectal cancer screening. All adults should have this screening starting at age 50 and continuing until age 75. Your health care provider may recommend screening at age 45 if you are at increased risk. You will have tests every 1-10 years, depending on your results and the type of screening test.  · Diabetes screening. This is done by checking your blood sugar (glucose) after you have not eaten for a while (fasting). You may have this done every 1-3 years.  · Mammogram. This may be done every 1-2 years. Talk with your health care provider about when you should start having regular mammograms. This may depend on whether you have a family history of breast cancer.  · BRCA-related cancer screening. This may be done if you have a family history of breast, ovarian, tubal, or peritoneal  cancers.  · Pelvic exam and Pap test. This may be done every 3 years starting at age 21. Starting at age 30, this may be done every 5 years if you have a Pap test in combination with an HPV test.  Other tests  · Sexually transmitted disease (STD) testing.  · Bone density scan. This is done to screen for osteoporosis. You may have this scan if you are at high risk for osteoporosis.  Follow these instructions at home:  Eating and drinking  · Eat a diet that includes fresh fruits and vegetables, whole grains, lean protein, and low-fat dairy.  · Take vitamin and mineral supplements as recommended by your health care provider.  · Do not drink alcohol if:  ? Your health care provider tells you not to drink.  ? You are pregnant, may be pregnant, or are planning to become pregnant.  · If you drink alcohol:  ? Limit how much you have to 0-1 drink a day.  ? Be aware of how much alcohol is in your drink. In the [place] one drink equals one 12 oz bottle of beer (355 mL), one 5 oz glass of wine (148 mL), or one 1½ oz glass of hard liquor (44 mL).  Lifestyle  · Take daily care of your teeth and gums.  · Stay active. Exercise for at least 30 minutes on 5 or more days each week.  · Do not use any products that contain nicotine or tobacco, such as cigarettes, e-cigarettes, and chewing tobacco. If you need help quitting, ask your health care provider.  · If you are sexually active, practice safe sex. Use a condom or other form of birth control (contraception) in order to prevent pregnancy and STIs (sexually transmitted infections).  · If told by your health care provider, take low-dose aspirin daily starting at age 50.  What's next?  · Visit your health care provider once a year for a well check visit.  · Ask your health care provider how often you should have your eyes and teeth checked.  · Stay up to date on all vaccines.  This information is not intended to replace advice given to you by your health care provider. Make sure you  discuss any questions you have with your health care provider.  Document Released: 01/13/2017 Document Revised: 08/29/2019 Document Reviewed: 08/29/2019  [employer] Interactive Patient Education © 2019 [employer]

## 2020-01-14 LAB
25(OH)D3+25(OH)D2 SERPL-MCNC: 15.4 NG/ML (ref 30–100)
ALBUMIN SERPL-MCNC: 4.6 G/DL (ref 3.5–5.2)
ALBUMIN/GLOB SERPL: 1.8 G/DL
ALP SERPL-CCNC: 81 U/L (ref 39–117)
ALT SERPL-CCNC: 20 U/L (ref 1–33)
AST SERPL-CCNC: 14 U/L (ref 1–32)
BASOPHILS # BLD AUTO: 0.05 10*3/MM3 (ref 0–0.2)
BASOPHILS NFR BLD AUTO: 0.7 % (ref 0–1.5)
BILIRUB SERPL-MCNC: 0.4 MG/DL (ref 0.2–1.2)
BUN SERPL-MCNC: 15 MG/DL (ref 8–23)
BUN/CREAT SERPL: 12.9 (ref 7–25)
CALCIUM SERPL-MCNC: 9.5 MG/DL (ref 8.6–10.5)
CHLORIDE SERPL-SCNC: 103 MMOL/L (ref 98–107)
CHOLEST SERPL-MCNC: 199 MG/DL (ref 0–200)
CO2 SERPL-SCNC: 26.5 MMOL/L (ref 22–29)
CREAT SERPL-MCNC: 1.16 MG/DL (ref 0.57–1)
EOSINOPHIL # BLD AUTO: 0.13 10*3/MM3 (ref 0–0.4)
EOSINOPHIL NFR BLD AUTO: 1.9 % (ref 0.3–6.2)
ERYTHROCYTE [DISTWIDTH] IN BLOOD BY AUTOMATED COUNT: 12.6 % (ref 12.3–15.4)
GLOBULIN SER CALC-MCNC: 2.5 GM/DL
GLUCOSE SERPL-MCNC: 111 MG/DL (ref 65–99)
HBA1C MFR BLD: 6.2 % (ref 4.8–5.6)
HCT VFR BLD AUTO: 39.9 % (ref 34–46.6)
HDLC SERPL-MCNC: 41 MG/DL (ref 40–60)
HGB BLD-MCNC: 13.5 G/DL (ref 12–15.9)
IMM GRANULOCYTES # BLD AUTO: 0.03 10*3/MM3 (ref 0–0.05)
IMM GRANULOCYTES NFR BLD AUTO: 0.4 % (ref 0–0.5)
LDLC SERPL CALC-MCNC: 117 MG/DL (ref 0–100)
LYMPHOCYTES # BLD AUTO: 1.44 10*3/MM3 (ref 0.7–3.1)
LYMPHOCYTES NFR BLD AUTO: 20.7 % (ref 19.6–45.3)
MCH RBC QN AUTO: 30.4 PG (ref 26.6–33)
MCHC RBC AUTO-ENTMCNC: 33.8 G/DL (ref 31.5–35.7)
MCV RBC AUTO: 89.9 FL (ref 79–97)
MONOCYTES # BLD AUTO: 0.57 10*3/MM3 (ref 0.1–0.9)
MONOCYTES NFR BLD AUTO: 8.2 % (ref 5–12)
NEUTROPHILS # BLD AUTO: 4.73 10*3/MM3 (ref 1.7–7)
NEUTROPHILS NFR BLD AUTO: 68.1 % (ref 42.7–76)
NRBC BLD AUTO-RTO: 0 /100 WBC (ref 0–0.2)
PLATELET # BLD AUTO: 226 10*3/MM3 (ref 140–450)
POTASSIUM SERPL-SCNC: 4.6 MMOL/L (ref 3.5–5.2)
PROT SERPL-MCNC: 7.1 G/DL (ref 6–8.5)
RBC # BLD AUTO: 4.44 10*6/MM3 (ref 3.77–5.28)
SODIUM SERPL-SCNC: 143 MMOL/L (ref 136–145)
TRIGL SERPL-MCNC: 206 MG/DL (ref 0–150)
TSH SERPL DL<=0.005 MIU/L-ACNC: 4.35 UIU/ML (ref 0.27–4.2)
VIT B12 SERPL-MCNC: 651 PG/ML (ref 211–946)
VLDLC SERPL CALC-MCNC: 41.2 MG/DL
WBC # BLD AUTO: 6.95 10*3/MM3 (ref 3.4–10.8)

## 2020-01-29 ENCOUNTER — OFFICE VISIT (OUTPATIENT)
Dept: FAMILY MEDICINE CLINIC | Facility: CLINIC | Age: 65
End: 2020-01-29

## 2020-01-29 VITALS
TEMPERATURE: 97.8 F | OXYGEN SATURATION: 97 % | HEART RATE: 82 BPM | HEIGHT: 64 IN | DIASTOLIC BLOOD PRESSURE: 80 MMHG | SYSTOLIC BLOOD PRESSURE: 138 MMHG | BODY MASS INDEX: 32.27 KG/M2

## 2020-01-29 DIAGNOSIS — G89.29 CHRONIC PAIN OF BOTH KNEES: Primary | ICD-10-CM

## 2020-01-29 DIAGNOSIS — Z78.0 POSTMENOPAUSAL: ICD-10-CM

## 2020-01-29 DIAGNOSIS — M17.0 BILATERAL PRIMARY OSTEOARTHRITIS OF KNEE: ICD-10-CM

## 2020-01-29 DIAGNOSIS — M25.562 CHRONIC PAIN OF BOTH KNEES: Primary | ICD-10-CM

## 2020-01-29 DIAGNOSIS — K21.9 GASTROESOPHAGEAL REFLUX DISEASE WITHOUT ESOPHAGITIS: ICD-10-CM

## 2020-01-29 DIAGNOSIS — M25.561 CHRONIC PAIN OF BOTH KNEES: Primary | ICD-10-CM

## 2020-01-29 PROCEDURE — 99213 OFFICE O/P EST LOW 20 MIN: CPT | Performed by: FAMILY MEDICINE

## 2020-01-29 RX ORDER — PANTOPRAZOLE SODIUM 20 MG/1
20 TABLET, DELAYED RELEASE ORAL DAILY
Qty: 30 TABLET | Refills: 5 | Status: SHIPPED | OUTPATIENT
Start: 2020-01-29 | End: 2021-06-02

## 2020-01-29 NOTE — PROGRESS NOTES
"Subjective   Lobito Mendoza is a 64 y.o. female.     Pt would like to discuss physical therapy for knees. Pt complains of discomfort and weakness in knees.    Taking 7-8 mg of omeprazole daily    History of Present Illness     Pt. States that she has had knee pain for a few years. She saw an ortho who took xrays, who wanted to give her shots however she refused. She would like to have PT to strengthen knees as she is afraid that she will fall. The patient has not taken any medication for the pain. She states that the pain has worsened over the past few years. She states that it is worse after she rests and gets up. She denies redness, swelling, or warmth. She has never tried heat or ice.    Patient states that when she took 20mg omeprazole, this reduced her GERD symptoms, however it resulted in diarrhea. Pt. States that she has been taking approximately 7 mg of powder from each capsule, which has been controlling her symptoms.     Pt. States that she has not had a DEXA scan in approximately 10 years and would like to have a referral for one today.     The following portions of the patient's history were reviewed and updated as appropriate: allergies, current medications, past family history, past medical history, past social history, past surgical history and problem list.    Review of Systems   Constitutional: Negative.    HENT: Negative.    Eyes: Negative.    Respiratory: Negative.    Cardiovascular: Negative.    Gastrointestinal: Negative.    Endocrine: Negative.    Genitourinary: Negative.    Musculoskeletal: Positive for arthralgias and gait problem.   Skin: Negative.    Allergic/Immunologic: Negative.    Hematological: Negative.    Psychiatric/Behavioral: Negative.    All other systems reviewed and are negative.      Objective     Vitals:    01/29/20 0933   BP: 138/80   Pulse: 82   Temp: 97.8 °F (36.6 °C)   SpO2: 97%   Height: 162.6 cm (64\")       Physical Exam   Constitutional: She is oriented to person, place, " and time. She appears well-developed and well-nourished.   HENT:   Head: Normocephalic and atraumatic.   Eyes: Pupils are equal, round, and reactive to light. EOM are normal. Right eye exhibits no discharge. Left eye exhibits no discharge.   Neck: Normal range of motion. Neck supple.   Cardiovascular: Normal rate, regular rhythm, normal heart sounds and intact distal pulses.   Pulmonary/Chest: Effort normal and breath sounds normal.   Abdominal: Soft. Bowel sounds are normal. She exhibits no mass. There is no tenderness.   Musculoskeletal: Normal range of motion.        Right shoulder: She exhibits no swelling.   Right knee painful on extension and tender to palpation.Left knee slight ache.    Neurological: She is alert and oriented to person, place, and time. She has normal reflexes.   Skin: Skin is warm and dry. No cyanosis. Nails show no clubbing.   Psychiatric: She has a normal mood and affect. Her behavior is normal. Judgment and thought content normal.   Nursing note and vitals reviewed.      Assessment/Plan     Problem List Items Addressed This Visit        Digestive    Gastroesophageal reflux disease without esophagitis    Relevant Medications    pantoprazole (PROTONIX) 20 MG EC tablet       Other    Bilateral primary osteoarthritis of knee      Other Visit Diagnoses     Osteoporosis screening    -  Primary    Chronic pain of both knees        Pt. referred to PT     Relevant Orders    Ambulatory Referral to Physical Therapy Evaluate and treat    Postmenopausal        Relevant Orders    DEXA Bone Density Axial        Pt. Referred to PT for bilateral knee pain.   Change to protonix 20mg tablet, which can be broken up if desired by patient. Discouraged to empty powder out of capsules of omeprazole.   DEXA bone scan ordered  Pt. Encouraged to keep exercising and eating well.  Follow up in one month if symptoms worsen or do not improve.  Pt. Is not interested in returning to ortho for steroid shots.

## 2020-02-05 ENCOUNTER — TELEPHONE (OUTPATIENT)
Dept: FAMILY MEDICINE CLINIC | Facility: CLINIC | Age: 65
End: 2020-02-05

## 2020-02-05 NOTE — TELEPHONE ENCOUNTER
----- Message from Indigo Paul MD sent at 2/5/2020  3:43 PM EST -----  Notify the patient that all her labs look normal however her TSH is just slightly elevated I think we should recheck this in a month.  Her hemoglobin A1c has gone up but still in the prediabetic range continue to work on diet and exercise.  Kidney fu  nction looks slightly elevated we will recheck that in a month as well.  Make sure you are taking vitamin D.  Cholesterol looks good

## 2020-02-17 ENCOUNTER — TREATMENT (OUTPATIENT)
Dept: PHYSICAL THERAPY | Facility: CLINIC | Age: 65
End: 2020-02-17

## 2020-02-17 DIAGNOSIS — M25.561 CHRONIC PAIN OF BOTH KNEES: Primary | ICD-10-CM

## 2020-02-17 DIAGNOSIS — G89.29 CHRONIC PAIN OF BOTH KNEES: Primary | ICD-10-CM

## 2020-02-17 DIAGNOSIS — M25.562 CHRONIC PAIN OF BOTH KNEES: Primary | ICD-10-CM

## 2020-02-17 PROCEDURE — 97161 PT EVAL LOW COMPLEX 20 MIN: CPT | Performed by: PHYSICAL THERAPIST

## 2020-02-17 PROCEDURE — 97110 THERAPEUTIC EXERCISES: CPT | Performed by: PHYSICAL THERAPIST

## 2020-02-17 NOTE — PROGRESS NOTES
Physical Therapy Initial Evaluation and Plan of Care      Patient: Lobito Mendoza   : 1955  Diagnosis/ICD-10 Code:  The encounter diagnosis was Chronic pain of both knees.   Referring practitioner: Indigo Paul MD  Date of Initial Visit: Type: THERAPY  Noted: 2020  Today's Date: 2020  Patient seen for 1 sessions         Lobito Mendoza reports:  B knee pain, R more than L  Subjective Questionnaire: LEFS: 55/80    Subjective Evaluation    History of Present Illness  Onset date: chronic, now limiting function.  Mechanism of injury: Pt reports no history of particular injury to knees.  Her primary concern is weakness in her knees.    Subjective comment: B knee pain  Patient Occupation: Book keeper for [employer]  Current pain ratin  At best pain ratin  At worst pain ratin  Relieving factors: rest  Exacerbated by: straightening knee after it has been bent.    Social Support  Lives in: multiple-level home    Hand dominance: right    Diagnostic Tests  No diagnostic tests performed    Patient Goals  Patient goals for therapy: increased strength           Treatment  Exercise 1  Exercise Name 1: Initial HEP education             Objective       Observations     Additional Observation Details  R knee lacks full extension and has varus deformity compared to left knee.    Neurological Testing     Sensation     Knee   Left Knee   Intact: light touch    Right Knee   Intact: light touch     Active Range of Motion   Left Knee   Flexion: 138 degrees   Extension: 0 degrees     Right Knee   Flexion: 130 degrees   Extensor la degrees     Passive Range of Motion     Additional Passive Range of Motion Details  Passive R knee extension to approximately -7 degrees.    Strength/Myotome Testing     Left Knee   Flexion: 5  Extension: 5    Right Knee   Flexion: 5  Extension: 5    Tests     Right Knee   Negative anterior drawer, posterior drawer, valgus stress test at 30 degrees, varus stress test  at 0 degrees and varus stress test at 30 degrees.     Additional Tests Details  Stable R knee         Assessment & Plan     Assessment  Impairments: abnormal or restricted ROM, activity intolerance, impaired physical strength, lacks appropriate home exercise program and pain with function  Assessment details: Clinical presentation consistent with knee OA, loss of R knee extension, varus deformity, and loss of functional strength.  Prognosis: good  Functional Limitations: walking, uncomfortable because of pain, moving in bed, standing and stooping  Goals  Plan Goals: Pt. demonstrates independence and compliance with initial HEP.  Pt. reports reduction in pain intensity to no worse than 4/10 on NPRS.  AROM of R knee extension shows improvement over baseline measures.  Functional outcome measure is improved by 8 points.    Pt. demonstrates independence in advanced HEP for ongoing improvement.  AROM of R knee is 5-135 or better.  B knee strength is sufficient for participation in desired activities.  Functional outcome measure is improved to 65/80 points.          Plan  Therapy options: will be seen for skilled physical therapy services  Planned modality interventions: cryotherapy and thermotherapy (hydrocollator packs)  Planned therapy interventions: manual therapy, neuromuscular re-education, postural training, strengthening, stretching, therapeutic activities, joint mobilization, home exercise program, gait training, functional ROM exercises and flexibility  Frequency: 1x week  Duration in visits: 6  Treatment plan discussed with: patient  Plan details: PT per POC, addressing loss of R knee mobility and B functional LE weakness.        Timed:  Manual Therapy:         mins  79111;  Therapeutic Exercise:         mins  41451;     Neuromuscular Kayleigh:        mins  89400;    Therapeutic Activity:          mins  87830;     Gait Training:           mins  80492;     Ultrasound:          mins  67208;    Electrical  Stimulation:         mins  92121 ( );    Untimed:  Electrical Stimulation:         mins  08528 ( );  Mechanical Traction:         mins  58479;     Timed Treatment:   10   mins   Total Treatment:     45   mins    PT SIGNATURE: Sonam Helton, PT   DATE TREATMENT INITIATED: 2/17/2020    Initial Certification  Certification Period: 5/17/2020  I certify that the therapy services are furnished while this patient is under my care.  The services outlined above are required by this patient, and will be reviewed every 90 days.     PHYSICIAN: Indigo Paul MD      DATE:     Please sign and return via fax to  .. Thank you, [facility].

## 2020-02-25 ENCOUNTER — TREATMENT (OUTPATIENT)
Dept: PHYSICAL THERAPY | Facility: CLINIC | Age: 65
End: 2020-02-25

## 2020-02-25 DIAGNOSIS — M25.562 CHRONIC PAIN OF BOTH KNEES: Primary | ICD-10-CM

## 2020-02-25 DIAGNOSIS — G89.29 CHRONIC PAIN OF BOTH KNEES: Primary | ICD-10-CM

## 2020-02-25 DIAGNOSIS — M25.561 CHRONIC PAIN OF BOTH KNEES: Primary | ICD-10-CM

## 2020-02-25 PROCEDURE — 97110 THERAPEUTIC EXERCISES: CPT | Performed by: PHYSICAL THERAPIST

## 2020-02-25 NOTE — PROGRESS NOTES
Physical Therapy Daily Progress Note  VISIT: 2      Lobito Mendoza reports: mild R knee pain this morning    Subjective     Treatment  Pre-treatment pain:  2  Post-treatment pain:  2  Exercise 1  Exercise Name 1: Bicycle  Equipment/Resistance 1: L1  Time: 5'  Exercise 2  Exercise Name 2: seated quad set into wall  Exercise 3  Exercise Name 3: seated hamstring stretch  Exercise 4  Exercise Name 4: standing calf stretch  Equipment/Resistance 4: seated HS stretch plus quad set  Exercise 5  Exercise Name 5: LAQ    Manual Rx 1  Manual Rx 1 Location: R knee  Manual Rx 1 Type: posterior femoral glide, passive knee extension        Objective     Assessment/Plan           Timed:  Manual Therapy:    5     mins  64869;  Therapeutic Exercise:    25     mins  61832;     Neuromuscular Kayleigh:        mins  09310;    Therapeutic Activity:          mins  13833;     Gait Training:           mins  62366;     Ultrasound:          mins  91817;    Electrical Stimulation:         mins  67473 ( );    Untimed:  Electrical Stimulation:         mins  24488 ( );  Mechanical Traction:         mins  90084;     Timed Treatment:   30   mins   Total Treatment:     30   mins      Sonam Helton PT  Physical Therapist

## 2020-03-03 ENCOUNTER — TREATMENT (OUTPATIENT)
Dept: PHYSICAL THERAPY | Facility: CLINIC | Age: 65
End: 2020-03-03

## 2020-03-03 DIAGNOSIS — G89.29 CHRONIC PAIN OF BOTH KNEES: Primary | ICD-10-CM

## 2020-03-03 DIAGNOSIS — M25.562 CHRONIC PAIN OF BOTH KNEES: Primary | ICD-10-CM

## 2020-03-03 DIAGNOSIS — M25.561 CHRONIC PAIN OF BOTH KNEES: Primary | ICD-10-CM

## 2020-03-03 PROCEDURE — 97110 THERAPEUTIC EXERCISES: CPT | Performed by: PHYSICAL THERAPIST

## 2020-03-03 NOTE — PROGRESS NOTES
Physical Therapy Daily Progress Note  VISIT: 3      Lobito Mendoza reports: mild R knee pain, feels better after stretching    Subjective     Treatment  Pre-treatment pain:  1  Post-treatment pain:  1  Exercise 1  Exercise Name 1: Bicycle  Equipment/Resistance 1: L1  Time: 6'  Exercise 2  Exercise Name 2: seated quad set into wall  Exercise 3  Exercise Name 3: seated hamstring stretch  Exercise 4  Exercise Name 4: standing calf stretch  Exercise 5  Exercise Name 5: LAQ  Exercise 6  Exercise Name 6: seated hamstring stretch plus QS  Exercise 7  Exercise Name 7: Fitter knee extension  Equipment/Resistance 7: 3 bands  Exercise 8  Exercise Name 8: CKC TKE  Equipment/Resistance 8: blue band    Manual Rx 1  Manual Rx 1 Location: R knee  Manual Rx 1 Type: posterior femoral glide, passive knee extension        Objective     Assessment & Plan     Assessment  Assessment details: R knee pain decreasing and flexibility improving.    Plan  Plan details: Continue PT.  Progress R knee flexibility and strengthening as tolerated.               Timed:  Manual Therapy:    5     mins  97142;  Therapeutic Exercise:    25     mins  83634;     Neuromuscular Kayleigh:        mins  40867;    Therapeutic Activity:          mins  07296;     Gait Training:           mins  72380;     Ultrasound:          mins  73091;    Electrical Stimulation:         mins  48734 ( );    Untimed:  Electrical Stimulation:         mins  17081 ( );  Mechanical Traction:         mins  67269;     Timed Treatment:   30   mins   Total Treatment:     30   mins      Sonam Helton, PT  Physical Therapist

## 2020-03-10 ENCOUNTER — TREATMENT (OUTPATIENT)
Dept: PHYSICAL THERAPY | Facility: CLINIC | Age: 65
End: 2020-03-10

## 2020-03-10 DIAGNOSIS — I10 ESSENTIAL HYPERTENSION: Primary | ICD-10-CM

## 2020-03-10 DIAGNOSIS — G89.29 CHRONIC PAIN OF BOTH KNEES: Primary | ICD-10-CM

## 2020-03-10 DIAGNOSIS — M25.561 CHRONIC PAIN OF BOTH KNEES: Primary | ICD-10-CM

## 2020-03-10 DIAGNOSIS — M25.562 CHRONIC PAIN OF BOTH KNEES: Primary | ICD-10-CM

## 2020-03-10 PROCEDURE — 97140 MANUAL THERAPY 1/> REGIONS: CPT | Performed by: PHYSICAL THERAPIST

## 2020-03-10 PROCEDURE — 97110 THERAPEUTIC EXERCISES: CPT | Performed by: PHYSICAL THERAPIST

## 2020-03-10 RX ORDER — LOSARTAN POTASSIUM 50 MG/1
50 TABLET ORAL DAILY
Qty: 90 TABLET | Refills: 3 | Status: SHIPPED | OUTPATIENT
Start: 2020-03-10 | End: 2020-11-03

## 2020-03-10 RX ORDER — HYDROCHLOROTHIAZIDE 12.5 MG/1
12.5 TABLET ORAL DAILY
Qty: 90 TABLET | Refills: 3 | Status: SHIPPED | OUTPATIENT
Start: 2020-03-10 | End: 2020-11-03

## 2020-03-10 NOTE — PROGRESS NOTES
Physical Therapy Daily Progress Note  VISIT: 4      Lobito Mendoza reports: very mild R knee discomfort upon arrival today.    Subjective     Treatment  Pre-treatment pain:  1  Post-treatment pain:  1  Exercise 1  Exercise Name 1: Bicycle  Equipment/Resistance 1: L1  Time: 5'  Exercise 2  Exercise Name 2: seated quad set   Exercise 3  Exercise Name 3: seated hamstring stretch  Exercise 4  Exercise Name 4: LAQ  Exercise 5  Exercise Name 5: Glider knee flex/ext in sitting  Exercise 6  Exercise Name 6: seated hamstring stretch plus QS    Manual Rx 1  Manual Rx 1 Location: R knee  Manual Rx 1 Type: patellar mobilization, posterior femoral glide, passive knee extension        Objective     Assessment & Plan     Assessment  Assessment details: R knee pain is decreasing.  Pt has intermittent stiffness which improves with stretching and mobilization.    Plan  Plan details: Continue PT, emphasizing R knee ROM and strength.               Timed:  Manual Therapy:    15     mins  76765;  Therapeutic Exercise:    10     mins  16304;     Neuromuscular Kayleigh:        mins  39813;    Therapeutic Activity:          mins  82910;     Gait Training:           mins  63992;     Ultrasound:          mins  09288;    Electrical Stimulation:         mins  12033 ( );    Untimed:  Electrical Stimulation:         mins  00990 ( );  Mechanical Traction:         mins  82781;     Timed Treatment:   25   mins   Total Treatment:     25   mins      Sonam Helton PT  Physical Therapist

## 2020-03-17 ENCOUNTER — TREATMENT (OUTPATIENT)
Dept: PHYSICAL THERAPY | Facility: CLINIC | Age: 65
End: 2020-03-17

## 2020-03-17 DIAGNOSIS — G89.29 CHRONIC PAIN OF BOTH KNEES: Primary | ICD-10-CM

## 2020-03-17 DIAGNOSIS — M25.562 CHRONIC PAIN OF BOTH KNEES: Primary | ICD-10-CM

## 2020-03-17 DIAGNOSIS — M25.561 CHRONIC PAIN OF BOTH KNEES: Primary | ICD-10-CM

## 2020-03-17 PROCEDURE — 97110 THERAPEUTIC EXERCISES: CPT | Performed by: PHYSICAL THERAPIST

## 2020-03-17 NOTE — PROGRESS NOTES
Physical Therapy Daily Progress Note  VISIT: 5      Lobito Mendoza reports: very mild knee discomfort coming up the stairs this morning.    Subjective     Treatment  Pre-treatment pain:  1  Post-treatment pain:  1  Exercise 1  Exercise Name 1: Bicycle  Equipment/Resistance 1: L1  Time: 5'  Exercise 2  Exercise Name 2: seated quad set pushing into wall  Exercise 3  Exercise Name 3: seated hamstring stretch  Exercise 4  Exercise Name 4: LAQ  Exercise 5  Exercise Name 5:  knee flex/ext in sitting  Exercise 6  Exercise Name 6: seated hamstring stretch plus QS  Exercise 7  Exercise Name 7: standing calf stretch  Exercise 8  Exercise Name 8: closed chain TKE  Equipment/Resistance 8: green band  Exercise 8 Home Program: Yes             Objective     Assessment & Plan     Assessment  Assessment details: R knee extension shows improvement.  Pain is decreasing and activity tolerance increasing.    Plan  Plan details: Continue and progress as tolerated.               Timed:  Manual Therapy:         mins  57638;  Therapeutic Exercise:    30     mins  06767;     Neuromuscular Kayleigh:        mins  13124;    Therapeutic Activity:          mins  37355;     Gait Training:           mins  03773;     Ultrasound:          mins  89984;    Electrical Stimulation:         mins  49840 ( );    Untimed:  Electrical Stimulation:         mins  05287 ( );  Mechanical Traction:         mins  80281;     Timed Treatment:   30   mins   Total Treatment:     30   mins      Sonam Helton PT  Physical Therapist

## 2020-05-05 ENCOUNTER — APPOINTMENT (OUTPATIENT)
Dept: BONE DENSITY | Facility: HOSPITAL | Age: 65
End: 2020-05-05

## 2020-05-14 ENCOUNTER — DOCUMENTATION (OUTPATIENT)
Dept: PHYSICAL THERAPY | Facility: CLINIC | Age: 65
End: 2020-05-14

## 2020-05-14 NOTE — PROGRESS NOTES
Discharge Summary  Discharge Summary from Physical Therapy Report      Patient: Lobito Mendoza   : 1955  Diagnosis/ICD-10 Code:  There were no encounter diagnoses.   Referring practitioner: No ref. provider found  Date of Initial Visit: No linked episodes  Today's Date: 2020  Date of Last Visit: 3-     Number of Visits: 5    Discharge Status of Patient: Treatment discontinued due to COVID-19 shut-down.    Goals: Partially Met    Discharge Plan: Continue with current home exercise program as instructed    Comments:  Pt may return with new order if needed.    Date of Discharge: 2020        Sonam Helton, PT

## 2020-07-08 RX ORDER — DOXYCYCLINE 100 MG/1
100 CAPSULE ORAL 2 TIMES DAILY
Qty: 28 CAPSULE | Refills: 0 | Status: SHIPPED | OUTPATIENT
Start: 2020-07-08 | End: 2021-06-02

## 2020-07-08 NOTE — TELEPHONE ENCOUNTER
PT CALLED CONCERNED THAT JENNIFER RECENTLY WERE DIAGNOSED AND TREATED FOR LYME DISEASE AND THAT SHE FOUND A COUPLE TICKS ON HER  THIS PAST Sunday. I LET PT KNOW DR CRUZ IS GOING TO SEND ANTIBIOTIC TO THE PHARMACY FOR HER TO START. PT VOICED UNDERSTANDING.

## 2020-08-11 ENCOUNTER — HOSPITAL ENCOUNTER (OUTPATIENT)
Dept: BONE DENSITY | Facility: HOSPITAL | Age: 65
Discharge: HOME | End: 2020-08-11
Admitting: FAMILY MEDICINE

## 2020-08-11 DIAGNOSIS — Z78.0 POST-MENOPAUSAL: ICD-10-CM

## 2020-08-11 PROCEDURE — 77080 DXA BONE DENSITY AXIAL: CPT

## 2020-08-12 RX ORDER — LISINOPRIL AND HYDROCHLOROTHIAZIDE 20; 12.5 MG/1; MG/1
TABLET ORAL
Qty: 90 TABLET | Refills: 0 | Status: SHIPPED | OUTPATIENT
Start: 2020-08-12 | End: 2020-11-03 | Stop reason: SDUPTHER

## 2020-10-07 ENCOUNTER — TELEPHONE (OUTPATIENT)
Dept: ORTHOPEDIC SURGERY | Facility: CLINIC | Age: 65
End: 2020-10-07

## 2020-10-07 ENCOUNTER — OFFICE VISIT (OUTPATIENT)
Dept: ORTHOPEDIC SURGERY | Facility: CLINIC | Age: 65
End: 2020-10-07

## 2020-10-07 VITALS — HEIGHT: 64 IN | WEIGHT: 191.6 LBS | OXYGEN SATURATION: 98 % | BODY MASS INDEX: 32.71 KG/M2 | HEART RATE: 97 BPM

## 2020-10-07 DIAGNOSIS — M17.11 PRIMARY OSTEOARTHRITIS OF RIGHT KNEE: Primary | ICD-10-CM

## 2020-10-07 PROCEDURE — 99203 OFFICE O/P NEW LOW 30 MIN: CPT | Performed by: ORTHOPAEDIC SURGERY

## 2020-10-07 PROCEDURE — 20610 DRAIN/INJ JOINT/BURSA W/O US: CPT | Performed by: ORTHOPAEDIC SURGERY

## 2020-10-07 RX ORDER — OMEPRAZOLE 10 MG/1
10 CAPSULE, DELAYED RELEASE ORAL DAILY
COMMUNITY
End: 2021-06-02

## 2020-10-07 RX ORDER — ROPIVACAINE HYDROCHLORIDE 5 MG/ML
4 INJECTION, SOLUTION EPIDURAL; INFILTRATION; PERINEURAL
Status: COMPLETED | OUTPATIENT
Start: 2020-10-07 | End: 2020-10-07

## 2020-10-07 RX ORDER — TRIAMCINOLONE ACETONIDE 40 MG/ML
40 INJECTION, SUSPENSION INTRA-ARTICULAR; INTRAMUSCULAR
Status: COMPLETED | OUTPATIENT
Start: 2020-10-07 | End: 2020-10-07

## 2020-10-07 RX ADMIN — ROPIVACAINE HYDROCHLORIDE 4 ML: 5 INJECTION, SOLUTION EPIDURAL; INFILTRATION; PERINEURAL at 15:56

## 2020-10-07 RX ADMIN — TRIAMCINOLONE ACETONIDE 40 MG: 40 INJECTION, SUSPENSION INTRA-ARTICULAR; INTRAMUSCULAR at 15:56

## 2020-10-07 NOTE — PROGRESS NOTES
Procedure   Large Joint Arthrocentesis: R knee  Date/Time: 10/7/2020 3:56 PM  Consent given by: patient  Site marked: site marked  Timeout: Immediately prior to procedure a time out was called to verify the correct patient, procedure, equipment, support staff and site/side marked as required   Supporting Documentation  Indications: pain   Procedure Details  Location: knee - R knee  Preparation: Patient was prepped and draped in the usual sterile fashion  Needle size: 22 G  Approach: anterolateral  Medications administered: 40 mg triamcinolone acetonide 40 MG/ML; 4 mL ropivacaine 0.5 %  Patient tolerance: patient tolerated the procedure well with no immediate complications

## 2020-10-07 NOTE — TELEPHONE ENCOUNTER
PATIENT CALLED AND IS IN PAIN AGAIN RIGHT AFTER GETTING SHOT, PLEASE CALL BACK TO [facility] [phone]

## 2020-10-07 NOTE — TELEPHONE ENCOUNTER
I called patient and explained that it was normal for her to have pain after a shot and to give the cortisone a week to kick in. She understood.  Alejandra

## 2020-10-07 NOTE — PROGRESS NOTES
[facility]    Subjective     Chief Complaint   Patient presents with   • Right Knee - Pain        HPI    Theyuliet Mendoza is a 64 y.o. female who presents with right knee pain.  Onset: atraumatic and gradual in nature. The issue has been ongoing for 5 year(s). Pain is a 4/10 on the pain scale. Pain is described as stabbing. Associated symptoms include pain. The pain is worse with walking and standing; resting improve the pain. Previous treatments have included: physical therapy.  No previous injections.    I have reviewed the following portions of the patient's history and agree with: History of Present Illness and Review of Systems    Patient Active Problem List   Diagnosis   • HTN (hypertension)   • Depression   • Prediabetes   • Carpal tunnel syndrome of right wrist   • Annual physical exam   • Gastroesophageal reflux disease without esophagitis   • Need for diphtheria-tetanus-pertussis (Tdap) vaccine   • Post-menopausal   • Bilateral primary osteoarthritis of knee   • Chronic pain of both knees   • Postmenopausal     Past Medical History:   Diagnosis Date   • Depression    • Esophageal ring    • GERD (gastroesophageal reflux disease)    • H/O LEEP    • Hearing loss in left ear    • HTN (hypertension)    • Kidney stones    • ANDREIA (obstructive sleep apnea)    • Prediabetes       Past Surgical History:   Procedure Laterality Date   • BREAST BIOPSY Right 2006   • CHOLECYSTECTOMY  2014   • COLONOSCOPY  2011   • ESOPHAGEAL DILATATION  2009   • NEPHRECTOMY PARTIAL Right 2014   • UPPER GASTROINTESTINAL ENDOSCOPY  2011      Family History   Problem Relation Age of Onset   • Atrial fibrillation Mother    • Hypertension Mother    • Heart disease Father    • Diabetes Father    • Breast cancer Paternal Aunt    • Ovarian cancer Neg Hx      Social History     Socioeconomic History   • Marital status:      Spouse name: Not on file   • Number of children: 4   • Years of education: Not on  "file   • Highest education level: Not on file   Occupational History   • Occupation:    Tobacco Use   • Smoking status: Never Smoker   • Smokeless tobacco: Never Used   Substance and Sexual Activity   • Alcohol use: No   • Drug use: No   • Sexual activity: Yes     Partners: Male     Comment:  for 41 years      Current Outpatient Medications on File Prior to Visit   Medication Sig Dispense Refill   • aspirin 81 MG EC tablet Take 1 tablet by mouth Daily. 90 tablet 3   • buPROPion XL (WELLBUTRIN XL) 300 MG 24 hr tablet Take 1 tablet by mouth Daily. 30 tablet 2   • lisinopril-hydrochlorothiazide (PRINZIDE,ZESTORETIC) 20-12.5 MG per tablet TAKE 1 TABLET BY MOUTH  DAILY 90 tablet 0   • omeprazole (prilOSEC) 10 MG capsule Take 10 mg by mouth Daily.     • VITAMIN D PO Take  by mouth.     • doxycycline (MONODOX) 100 MG capsule Take 1 capsule by mouth 2 (Two) Times a Day. 28 capsule 0   • hydroCHLOROthiazide (HYDRODIURIL) 12.5 MG tablet Take 1 tablet by mouth Daily. 90 tablet 3   • losartan (COZAAR) 50 MG tablet Take 1 tablet by mouth Daily. 90 tablet 3   • pantoprazole (PROTONIX) 20 MG EC tablet Take 1 tablet by mouth Daily. 30 tablet 5     No current facility-administered medications on file prior to visit.       No Known Allergies     Review of Systems   Constitutional: Negative.    HENT: Positive for hearing loss.    Eyes: Negative.    Respiratory: Positive for apnea.    Cardiovascular: Negative.    Gastrointestinal: Negative.    Endocrine: Negative.    Genitourinary: Negative.    Musculoskeletal: Positive for arthralgias and gait problem.   Skin: Negative.    Allergic/Immunologic: Negative.    Hematological: Negative.    Psychiatric/Behavioral: Negative.         Objective      Physical Exam  Pulse 97   Ht 162.6 cm (64.02\")   Wt 86.9 kg (191 lb 9.6 oz)   SpO2 98%   BMI 32.87 kg/m²     Body mass index is 32.87 kg/m².    General:   Mental Status:  Alert   Appearance: Cooperative, in no acute " distress   Build and Nutrition: Overweight female   Orientation: Alert and oriented to person, place and time   Posture: Normal   Gait: Normal    Integument:   Right knee: No skin lesions, no rash, no ecchymosis    Neurologic:   Sensation:    Right foot: Intact to light touch on the dorsal and plantar aspect   Motor:  Right lower extremity: 5/5 quadriceps, hamstrings, ankle dorsiflexors, and ankle plantar flexors    Vascular:   Right lower extremity: 2+ dorsalis pedis pulse, prompt capillary refill    Lower Extremities:   Right Knee:    Tenderness:  None    Effusion:  None    Swelling:  None    Crepitus:  Positive    Atrophy:  None    Range of motion:  Extension: 5°       Flexion: 120°  Instability:  No varus laxity, no valgus laxity, negative anterior drawer  Deformities:  Varus      Imaging/Studies      Imaging Results (Last 24 Hours)     Procedure Component Value Units Date/Time    XR Knee 4+ View Right [[id number]] Resulted: 10/07/20 1547     Updated: 10/07/20 1547    Narrative:      Right Knee Radiographs  Indication: right knee pain  Views: Standing AP's and skiers of both knees, with lateral and sunrise   views of the right knee    Comparison: no prior studies available    Findings:   Advanced arthritic changes, with bone-on-bone contact medial compartment,   tricompartmental osteophytes, varus alignment, no acute bony   abnormalities.          Assessment and Plan     Darrion was seen today for pain.    Diagnoses and all orders for this visit:    Primary osteoarthritis of right knee  -     XR Knee 4+ View Right  -     Large Joint Arthrocentesis: R knee        1. Primary osteoarthritis of right knee        I reviewed my findings with patient today.  She has advanced right knee arthritis, and we discussed treatment options today.  Pain does not progress to the point where she would like to consider knee replacement surgery at this time.  She would like to try an injection, this provided.  I will see her back  in 4 months, but sooner for any problems.    Procedure Note:  The potential benefits of performing a therapeutic right knee joint injection, as well as potential risks (including, but not limited to infection, swelling, pain, bleeding, bruising, nerve/blood vessel damage, skin color changes, transient elevation in blood glucose levels, and fat atrophy) were discussed with the patient.  After informed consent, timeout procedure was performed, and the skin on the right knee was prepped with chlorhexidine soap and alcohol, after which ethyl chloride was applied to the skin at the injection site. Via the anterolateral approach, 1ml of Kenalog 40mg/ml mixed with 4ml 0.5% ropivacaine plain was injected into the knee joint.  The patient tolerated the procedure well, experiencing 90% improvement a few minutes following the injection. There were no complications.  Band-Aid was applied to the injection site. Post-procedural instructions were given to the patient and/or their caregiver.      Return in about 4 months (around 2/7/2021).    Medical Decision Making  Management Options : prescription/IM medicine  Data/Risk: radiology tests and independent visualization of imaging, lab tests, or EMG/NCV      Matt Marie MD  10/07/20  16:15 EDT    Dragon disclaimer:  Much of this encounter note is an electronic transcription/translation of spoken language to printed text. The electronic translation of spoken language may permit erroneous, or at times, nonsensical words or phrases to be inadvertently transcribed; Although I have reviewed the note for such errors, some may still exist.

## 2020-11-03 ENCOUNTER — TELEPHONE (OUTPATIENT)
Dept: FAMILY MEDICINE CLINIC | Facility: CLINIC | Age: 65
End: 2020-11-03

## 2020-11-03 DIAGNOSIS — I10 ESSENTIAL HYPERTENSION: Primary | ICD-10-CM

## 2020-11-03 RX ORDER — LISINOPRIL AND HYDROCHLOROTHIAZIDE 20; 12.5 MG/1; MG/1
1 TABLET ORAL DAILY
Qty: 90 TABLET | Refills: 3 | Status: SHIPPED | OUTPATIENT
Start: 2020-11-03 | End: 2021-12-22

## 2020-11-03 NOTE — TELEPHONE ENCOUNTER
Let patient know that I have sent a new prescription for lisinopril hydrochlorothiazide combined into 1 pill.  She is to stop the extra hydrochlorothiazide.  I have also discontinued the losartan.

## 2020-11-03 NOTE — TELEPHONE ENCOUNTER
WAS COUGHING WITH LISINOPRIL.  DR CRUZ PRESCRIBED LOSARTAN.  STAYED  OFF LISINPRILFOR 6 WEEKS.   AM ON IT NOW WITH NO PROBLEMS. PT ASKED FOR A NEW PRESCRIPTION FOR LISINOPRIL.      PT CONTACT [phone]     PHARMACY VERIFIED [id number] [place], [place] - [address] AT [facility] [address] [facility] [phone] [facility] [phone] FX    PLEASE [facility]

## 2021-02-08 ENCOUNTER — TELEPHONE (OUTPATIENT)
Dept: ORTHOPEDIC SURGERY | Facility: CLINIC | Age: 66
End: 2021-02-08

## 2021-02-08 NOTE — TELEPHONE ENCOUNTER
Caller: TRISTEN MAYORGA      Relationship to patient: SELF     Best call back number: [phone]    Type of visit: FOLLOW UP/INJECTION    If rescheduling, when is the original appointment:  2/10/2021    Additional notes: PATIENT WANTS TO CANCEL INJECTION APPOINTMENT

## 2021-03-21 DIAGNOSIS — I10 ESSENTIAL HYPERTENSION: ICD-10-CM

## 2021-03-22 RX ORDER — ASPIRIN 81 MG/1
TABLET, COATED ORAL
Qty: 90 TABLET | Refills: 3 | Status: SHIPPED | OUTPATIENT
Start: 2021-03-22 | End: 2022-01-24

## 2021-05-24 ENCOUNTER — TELEPHONE (OUTPATIENT)
Dept: FAMILY MEDICINE CLINIC | Facility: CLINIC | Age: 66
End: 2021-05-24

## 2021-06-01 DIAGNOSIS — R79.89 ELEVATED TSH: Primary | ICD-10-CM

## 2021-06-02 ENCOUNTER — OFFICE VISIT (OUTPATIENT)
Dept: ORTHOPEDIC SURGERY | Facility: CLINIC | Age: 66
End: 2021-06-02

## 2021-06-02 ENCOUNTER — LAB (OUTPATIENT)
Dept: FAMILY MEDICINE CLINIC | Facility: CLINIC | Age: 66
End: 2021-06-02

## 2021-06-02 VITALS
BODY MASS INDEX: 32.47 KG/M2 | WEIGHT: 190.2 LBS | HEART RATE: 63 BPM | HEIGHT: 64 IN | SYSTOLIC BLOOD PRESSURE: 164 MMHG | DIASTOLIC BLOOD PRESSURE: 93 MMHG

## 2021-06-02 DIAGNOSIS — E03.9 HYPOTHYROIDISM, UNSPECIFIED TYPE: Primary | ICD-10-CM

## 2021-06-02 DIAGNOSIS — R79.89 ELEVATED TSH: ICD-10-CM

## 2021-06-02 DIAGNOSIS — M17.11 PRIMARY OSTEOARTHRITIS OF RIGHT KNEE: Primary | ICD-10-CM

## 2021-06-02 LAB — TSH SERPL DL<=0.05 MIU/L-ACNC: 6.1 UIU/ML (ref 0.27–4.2)

## 2021-06-02 PROCEDURE — 20610 DRAIN/INJ JOINT/BURSA W/O US: CPT | Performed by: ORTHOPAEDIC SURGERY

## 2021-06-02 PROCEDURE — 84443 ASSAY THYROID STIM HORMONE: CPT | Performed by: FAMILY MEDICINE

## 2021-06-02 RX ORDER — ROPIVACAINE HYDROCHLORIDE 5 MG/ML
4 INJECTION, SOLUTION EPIDURAL; INFILTRATION; PERINEURAL
Status: COMPLETED | OUTPATIENT
Start: 2021-06-02 | End: 2021-06-02

## 2021-06-02 RX ORDER — LEVOTHYROXINE SODIUM 0.05 MG/1
50 TABLET ORAL DAILY
Qty: 90 TABLET | Refills: 3 | Status: SHIPPED | OUTPATIENT
Start: 2021-06-02 | End: 2022-05-20 | Stop reason: SDUPTHER

## 2021-06-02 RX ORDER — TRIAMCINOLONE ACETONIDE 40 MG/ML
40 INJECTION, SUSPENSION INTRA-ARTICULAR; INTRAMUSCULAR
Status: COMPLETED | OUTPATIENT
Start: 2021-06-02 | End: 2021-06-02

## 2021-06-02 RX ADMIN — TRIAMCINOLONE ACETONIDE 40 MG: 40 INJECTION, SUSPENSION INTRA-ARTICULAR; INTRAMUSCULAR at 10:43

## 2021-06-02 RX ADMIN — ROPIVACAINE HYDROCHLORIDE 4 ML: 5 INJECTION, SOLUTION EPIDURAL; INFILTRATION; PERINEURAL at 10:43

## 2021-06-02 NOTE — PROGRESS NOTES
Procedure   Large Joint Arthrocentesis: R knee  Date/Time: 6/2/2021 10:43 AM  Consent given by: patient  Site marked: site marked  Timeout: Immediately prior to procedure a time out was called to verify the correct patient, procedure, equipment, support staff and site/side marked as required   Supporting Documentation  Indications: pain   Procedure Details  Location: knee - R knee  Preparation: Patient was prepped and draped in the usual sterile fashion  Needle size: 23 G  Approach: anterolateral  Medications administered: 4 mL ropivacaine 0.5 %; 40 mg triamcinolone acetonide 40 MG/ML  Patient tolerance: patient tolerated the procedure well with no immediate complications

## 2021-06-02 NOTE — PROGRESS NOTES
[facility] [place] [facility] Note    Subjective     Chief Complaint   Patient presents with   • Follow-up     Primary osteoarthritis of right knee; injection 10.07.2020        HPI    Darrion Mendoza is a 65 y.o. female who follows up for right knee arthritis.     She had a cortisone injection in 10/2020, which provided significant relief. The pain is not currently as severe as it was at her last visit. Her right knee has been bothering her for approximately 4 to 5 years and it is painful with walking, standing, climbing stairs, and rising from a seated position. Her right knee is limiting her, especially with a prolonged distance walk.    I have reviewed the following portions of the patient's history and agree with: History of Present Illness and Review of Systems    Patient Active Problem List   Diagnosis   • HTN (hypertension)   • Depression   • Prediabetes   • Carpal tunnel syndrome of right wrist   • Annual physical exam   • Gastroesophageal reflux disease without esophagitis   • Need for diphtheria-tetanus-pertussis (Tdap) vaccine   • Post-menopausal   • Bilateral primary osteoarthritis of knee   • Chronic pain of both knees   • Postmenopausal     Past Medical History:   Diagnosis Date   • Depression    • Esophageal ring    • GERD (gastroesophageal reflux disease)    • H/O LEEP    • Hearing loss in left ear    • HTN (hypertension)    • Kidney stones    • ANDREIA (obstructive sleep apnea)    • Prediabetes       Past Surgical History:   Procedure Laterality Date   • BREAST BIOPSY Right 2006   • CHOLECYSTECTOMY  2014   • COLONOSCOPY  2011   • ESOPHAGEAL DILATATION  2009   • NEPHRECTOMY PARTIAL Right 2014   • UPPER GASTROINTESTINAL ENDOSCOPY  2011      Family History   Problem Relation Age of Onset   • Atrial fibrillation Mother    • Hypertension Mother    • Heart disease Father    • Diabetes Father    • Breast cancer Paternal Aunt    • Ovarian cancer Neg Hx      Social History     Socioeconomic History   •  Marital status:      Spouse name: Not on file   • Number of children: 4   • Years of education: Not on file   • Highest education level: Not on file   Tobacco Use   • Smoking status: Never Smoker   • Smokeless tobacco: Never Used   Substance and Sexual Activity   • Alcohol use: No   • Drug use: No   • Sexual activity: Yes     Partners: Male     Comment:  for 41 years      Current Outpatient Medications on File Prior to Visit   Medication Sig Dispense Refill   • Aspirin Low Dose 81 MG EC tablet TAKE 1 TABLET BY MOUTH ONCE DAILY 90 tablet 3   • buPROPion XL (WELLBUTRIN XL) 300 MG 24 hr tablet Take 1 tablet by mouth Daily. 30 tablet 2   • lisinopril-hydrochlorothiazide (PRINZIDE,ZESTORETIC) 20-12.5 MG per tablet Take 1 tablet by mouth Daily. 90 tablet 3   • VITAMIN D PO Take  by mouth.     • [DISCONTINUED] doxycycline (MONODOX) 100 MG capsule Take 1 capsule by mouth 2 (Two) Times a Day. 28 capsule 0   • [DISCONTINUED] omeprazole (prilOSEC) 10 MG capsule Take 10 mg by mouth Daily.     • [DISCONTINUED] pantoprazole (PROTONIX) 20 MG EC tablet Take 1 tablet by mouth Daily. 30 tablet 5     No current facility-administered medications on file prior to visit.      No Known Allergies     Review of Systems   Constitutional: Negative for activity change, appetite change, chills, diaphoresis, fatigue, fever and unexpected weight change.   HENT: Negative for congestion, dental problem, drooling, ear discharge, ear pain, facial swelling, hearing loss, mouth sores, nosebleeds, postnasal drip, rhinorrhea, sinus pressure, sneezing, sore throat, tinnitus, trouble swallowing and voice change.    Eyes: Negative for photophobia, pain, discharge, redness, itching and visual disturbance.   Respiratory: Negative for apnea, cough, choking, chest tightness, shortness of breath, wheezing and stridor.    Cardiovascular: Negative for chest pain, palpitations and leg swelling.   Gastrointestinal: Negative for abdominal distention,  "abdominal pain, anal bleeding, blood in stool, constipation, diarrhea, nausea, rectal pain and vomiting.   Endocrine: Negative for cold intolerance, heat intolerance, polydipsia, polyphagia and polyuria.   Genitourinary: Negative for decreased urine volume, difficulty urinating, dysuria, enuresis, flank pain, frequency, genital sores, hematuria and urgency.   Musculoskeletal: Positive for arthralgias. Negative for back pain, gait problem, joint swelling, myalgias, neck pain and neck stiffness.   Skin: Negative for color change, pallor, rash and wound.   Allergic/Immunologic: Negative for environmental allergies, food allergies and immunocompromised state.   Neurological: Negative for dizziness, tremors, seizures, syncope, facial asymmetry, speech difficulty, weakness, light-headedness, numbness and headaches.   Hematological: Negative for adenopathy. Does not bruise/bleed easily.   Psychiatric/Behavioral: Negative for agitation, behavioral problems, confusion, decreased concentration, dysphoric mood, hallucinations, self-injury, sleep disturbance and suicidal ideas. The patient is not nervous/anxious and is not hyperactive.         Objective      Physical Exam  /93   Pulse 63   Ht 162.6 cm (64.02\")   Wt 86.3 kg (190 lb 3.2 oz)   BMI 32.63 kg/m²     Body mass index is 32.63 kg/m².    General:   Mental Status:  Alert   Appearance: Cooperative, in no acute distress   Build and Nutrition: Overweight by BMI female   Orientation: Alert and oriented to person, place and time   Posture: Normal   Gait: Normal    Integument  • Right knee: No skin lesions, rash, or ecchymosis.    Lower Extremities  • Right Knee:  • Tenderness: No medial or lateral joint line tenderness.  • Swelling: None  • Effusion: Trace  • Crepitus: Positive  • Atrophy: None  • Range of motion:  • Extension: 5°  • Flexion: 115°  • Instability: No varus or valgus laxity. Negative anterior drawer.  • Deformities: Varus " alignment.    Imaging/Studies  No new radiographs were obtained.      Assessment and Plan     Diagnoses and all orders for this visit:    1. Primary osteoarthritis of right knee (Primary)  -     Large Joint Arthrocentesis: R knee        1. Primary osteoarthritis of right knee        I have reviewed my findings with the patient today.we discussed treatment options, and she would like to proceed with a steroid injection.  Viscosupplementation injections are an option in the future, but the response may not be predictable given her bone-on-bone contact.  She has elected to receive a cortisone injection into the right knee today.    Procedure Note:   The potential benefits of performing a therapeutic right knee joint injection, as well as potential risks (including, but not limited to infection, swelling, pain, bleeding, bruising, nerve/blood vessel damage, skin color changes, transient elevation in blood glucose levels, and fat atrophy) were discussed with the patient.  After informed consent was obtained, a timeout procedure was performed, and the skin on the right knee was prepped with chlorhexidine soap and alcohol, after which ethyl chloride was applied to the skin at the injection site. Via the anterolateral approach, 1 ml of Kenalog 40 mg/ml mixed with 4 ml 0.5% ropivacaine plain was injected into the knee joint.  The patient tolerated the procedure well, experiencing 100% improvement a few minutes following the injection. There were no complications. A Band-Aid was applied to the injection site. Post-procedural instructions were given to the patient and/or their caregiver.    Return in about 4 months (around 10/2/2021).      Scribed for Matt Marie MD by Tommy Greer.  06/02/21   12:34 EDT    I have personally performed the services described in this document as scribed by the above individual, and it is both accurate and complete.  Matt Marie MD  6/2/2021  13:45 EDT

## 2021-07-07 ENCOUNTER — TELEPHONE (OUTPATIENT)
Dept: FAMILY MEDICINE CLINIC | Facility: CLINIC | Age: 66
End: 2021-07-07

## 2021-07-07 NOTE — TELEPHONE ENCOUNTER
Caller: Darrion Mendoza    Relationship: Self    Best call back number:[phone]    What is the best time to reach you: ANY TIME    Who are you requesting to speak with (clinical staff, provider,  specific staff member): DR CRUZ    Do you know the name of the person who called: SELF    What was the call regarding: VERY IMPORTANT THAT PATIENT GETS INTO [facility]. MUST HAVE THYROID NUMBER DOWN TO 4.2, HAVE BEEN ON LEVOTHYROXINE FOR MORE THAN A MONTH, CAN WE CHECK IT AGAIN PLEASE. I MUST HAVE A GOOD READING FOR 3 MONTHS BEFORE I CAN GET BACK IN THE STUDY.     Do you require a callback: YES

## 2021-07-13 ENCOUNTER — OFFICE VISIT (OUTPATIENT)
Dept: FAMILY MEDICINE CLINIC | Facility: CLINIC | Age: 66
End: 2021-07-13

## 2021-07-13 ENCOUNTER — TELEPHONE (OUTPATIENT)
Dept: FAMILY MEDICINE CLINIC | Facility: CLINIC | Age: 66
End: 2021-07-13

## 2021-07-13 VITALS
DIASTOLIC BLOOD PRESSURE: 80 MMHG | HEART RATE: 73 BPM | BODY MASS INDEX: 32.78 KG/M2 | SYSTOLIC BLOOD PRESSURE: 130 MMHG | WEIGHT: 192 LBS | TEMPERATURE: 98.5 F | RESPIRATION RATE: 16 BRPM | OXYGEN SATURATION: 98 % | HEIGHT: 64 IN

## 2021-07-13 DIAGNOSIS — E03.9 ACQUIRED HYPOTHYROIDISM: Primary | Chronic | ICD-10-CM

## 2021-07-13 DIAGNOSIS — Z12.39 ENCOUNTER FOR SCREENING FOR MALIGNANT NEOPLASM OF BREAST, UNSPECIFIED SCREENING MODALITY: ICD-10-CM

## 2021-07-13 PROCEDURE — 84443 ASSAY THYROID STIM HORMONE: CPT | Performed by: FAMILY MEDICINE

## 2021-07-13 PROCEDURE — 86376 MICROSOMAL ANTIBODY EACH: CPT | Performed by: FAMILY MEDICINE

## 2021-07-13 PROCEDURE — 84480 ASSAY TRIIODOTHYRONINE (T3): CPT | Performed by: FAMILY MEDICINE

## 2021-07-13 PROCEDURE — 99213 OFFICE O/P EST LOW 20 MIN: CPT | Performed by: FAMILY MEDICINE

## 2021-07-13 PROCEDURE — 84439 ASSAY OF FREE THYROXINE: CPT | Performed by: FAMILY MEDICINE

## 2021-07-13 PROCEDURE — 36415 COLL VENOUS BLD VENIPUNCTURE: CPT | Performed by: FAMILY MEDICINE

## 2021-07-13 NOTE — TELEPHONE ENCOUNTER
Caller: Darrion Mendoza    Relationship: Self    Best call back number: [phone]    Caller requesting test results: YES    What test was performed: BLOOD WORK    When was the test performed: 07/13    Where was the test performed: IN OFFICE    Additional notes: PATIENT WOULD LIKE THESE FAXED TO HER    FAX: [phone]

## 2021-07-13 NOTE — PROGRESS NOTES
"Subjective   Theodoris JENNY Mendoza is a 65 y.o. female.     Hypothyroidism  This is a new problem. The current episode started more than 1 month ago. The problem occurs intermittently. The problem has been unchanged. Pertinent negatives include no anorexia, arthralgias, change in bowel habit, chest pain, chills, congestion, coughing, headaches, joint swelling, nausea or visual change. Nothing aggravates the symptoms. She has tried nothing for the symptoms. The treatment provided no relief.      She initially had the TSH checked for an Alzheimer dementia study, we recheck the TSH and it was also elevated.  She reports she has to have a normal TSH for 3 months before she can will be admitted to the study  The following portions of the patient's history were reviewed and updated as appropriate: allergies, current medications, past family history, past medical history, past social history, past surgical history and problem list.    Review of Systems   Constitutional: Negative for chills.   HENT: Negative for congestion.    Respiratory: Negative for cough.    Cardiovascular: Negative for chest pain.   Gastrointestinal: Negative for anorexia, change in bowel habit and nausea.   Musculoskeletal: Negative for arthralgias and joint swelling.   Neurological: Negative for headaches.       Objective     Vitals:    07/13/21 1006   BP: 130/80   Pulse: 73   Resp: 16   Temp: 98.5 °F (36.9 °C)   SpO2: 98%   Weight: 87.1 kg (192 lb)   Height: 162.6 cm (64\")       Physical Exam  Vitals and nursing note reviewed.   Constitutional:       Appearance: She is well-developed.   HENT:      Head: Normocephalic and atraumatic.   Eyes:      General:         Right eye: No discharge.         Left eye: No discharge.      Pupils: Pupils are equal, round, and reactive to light.   Cardiovascular:      Rate and Rhythm: Normal rate and regular rhythm.      Heart sounds: Normal heart sounds.   Pulmonary:      Effort: Pulmonary effort is normal.      Breath " sounds: Normal breath sounds.   Abdominal:      General: Bowel sounds are normal.      Palpations: Abdomen is soft. There is no mass.      Tenderness: There is no abdominal tenderness.   Musculoskeletal:         General: Normal range of motion.      Right shoulder: No swelling.      Cervical back: Normal range of motion and neck supple.   Skin:     General: Skin is warm and dry.      Nails: There is no clubbing.   Neurological:      Mental Status: She is alert and oriented to person, place, and time.      Deep Tendon Reflexes: Reflexes are normal and symmetric.   Psychiatric:         Behavior: Behavior normal.         Thought Content: Thought content normal.         Judgment: Judgment normal.         Assessment/Plan     Problem List Items Addressed This Visit        Endocrine and Metabolic    Acquired hypothyroidism - Primary (Chronic)    Relevant Orders    TSH    T3    T4, free    Thyroid Peroxidase Antibody      Other Visit Diagnoses     Encounter for screening for malignant neoplasm of breast, unspecified screening modality        Relevant Orders    Mammo Screening Digital Tomosynthesis Bilateral With CAD

## 2021-07-14 LAB
T3 SERPL-MCNC: 104 NG/DL (ref 80–200)
T4 FREE SERPL-MCNC: 1.15 NG/DL (ref 0.93–1.7)
TSH SERPL DL<=0.05 MIU/L-ACNC: 2.23 UIU/ML (ref 0.27–4.2)

## 2021-07-15 LAB — THYROPEROXIDASE AB SERPL-ACNC: 27 IU/ML (ref 0–34)

## 2021-07-21 ENCOUNTER — TRANSCRIBE ORDERS (OUTPATIENT)
Dept: ADMINISTRATIVE | Facility: HOSPITAL | Age: 66
End: 2021-07-21

## 2021-07-21 DIAGNOSIS — Z12.31 VISIT FOR SCREENING MAMMOGRAM: Primary | ICD-10-CM

## 2021-08-31 ENCOUNTER — TELEPHONE (OUTPATIENT)
Dept: ORTHOPEDIC SURGERY | Facility: CLINIC | Age: 66
End: 2021-08-31

## 2021-08-31 NOTE — TELEPHONE ENCOUNTER
Caller: SD MAYORGA    Relationship to patient: SELF    Best call back number: [phone]    Type of visit: INECTION    Requested date: BEFORE 9/27    If rescheduling, when is the original appointment: 10/4    Additional notes:PATIENT GOING ON VACATION AND WONT BE BACK UNTIL 10/4

## 2021-08-31 NOTE — TELEPHONE ENCOUNTER
PATIENT WOULD LIKE TO KNOW IF THERE IS ANYWAY SHE CAN GET AN INJECTIONS BEFORE HER VACATION ON 9/27 SINCE SHE WILL BE WALKING A LOT. THAT IS ABOUT A WEEK SHY OF THE 4 MONTH [place]. PLEASE ADVISE.

## 2021-09-01 NOTE — TELEPHONE ENCOUNTER
If she is interested in a steroid injection before her vacation, she is eligible and we can schedule an appointment.

## 2021-09-13 DIAGNOSIS — R79.1 ELEVATED PARTIAL THROMBOPLASTIN TIME (PTT): Primary | ICD-10-CM

## 2021-09-14 ENCOUNTER — HOSPITAL ENCOUNTER (OUTPATIENT)
Dept: MAMMOGRAPHY | Facility: HOSPITAL | Age: 66
Discharge: HOME | End: 2021-09-14
Admitting: FAMILY MEDICINE

## 2021-09-14 ENCOUNTER — TELEPHONE (OUTPATIENT)
Dept: FAMILY MEDICINE CLINIC | Facility: CLINIC | Age: 66
End: 2021-09-14

## 2021-09-14 DIAGNOSIS — Z12.39 ENCOUNTER FOR SCREENING FOR MALIGNANT NEOPLASM OF BREAST, UNSPECIFIED SCREENING MODALITY: ICD-10-CM

## 2021-09-14 PROCEDURE — 77063 BREAST TOMOSYNTHESIS BI: CPT | Performed by: RADIOLOGY

## 2021-09-14 PROCEDURE — 77067 SCR MAMMO BI INCL CAD: CPT | Performed by: RADIOLOGY

## 2021-09-14 PROCEDURE — 77067 SCR MAMMO BI INCL CAD: CPT

## 2021-09-14 PROCEDURE — 77063 BREAST TOMOSYNTHESIS BI: CPT

## 2021-09-22 ENCOUNTER — OFFICE VISIT (OUTPATIENT)
Dept: ORTHOPEDIC SURGERY | Facility: CLINIC | Age: 66
End: 2021-09-22

## 2021-09-22 VITALS
SYSTOLIC BLOOD PRESSURE: 166 MMHG | DIASTOLIC BLOOD PRESSURE: 104 MMHG | HEART RATE: 86 BPM | HEIGHT: 64 IN | BODY MASS INDEX: 32.91 KG/M2 | WEIGHT: 192.8 LBS

## 2021-09-22 DIAGNOSIS — M17.11 PRIMARY OSTEOARTHRITIS OF RIGHT KNEE: Primary | ICD-10-CM

## 2021-09-22 DIAGNOSIS — R79.1 ABNORMAL PARTIAL THROMBOPLASTIN TIME (PTT): Primary | ICD-10-CM

## 2021-09-22 PROCEDURE — 20610 DRAIN/INJ JOINT/BURSA W/O US: CPT | Performed by: ORTHOPAEDIC SURGERY

## 2021-09-22 PROCEDURE — 99214 OFFICE O/P EST MOD 30 MIN: CPT | Performed by: ORTHOPAEDIC SURGERY

## 2021-09-22 RX ADMIN — ROPIVACAINE HYDROCHLORIDE 4 ML: 5 INJECTION, SOLUTION EPIDURAL; INFILTRATION; PERINEURAL at 15:00

## 2021-09-22 RX ADMIN — TRIAMCINOLONE ACETONIDE 40 MG: 40 INJECTION, SUSPENSION INTRA-ARTICULAR; INTRAMUSCULAR at 15:00

## 2021-09-22 NOTE — PROGRESS NOTES
Procedure   Large Joint Arthrocentesis: R knee  Date/Time: 9/22/2021 3:00 PM  Consent given by: patient  Site marked: site marked  Timeout: Immediately prior to procedure a time out was called to verify the correct patient, procedure, equipment, support staff and site/side marked as required   Supporting Documentation  Indications: pain   Procedure Details  Location: knee - R knee  Preparation: Patient was prepped and draped in the usual sterile fashion  Needle size: 23 G  Approach: anterolateral  Medications administered: 4 mL ropivacaine 0.5 %; 40 mg triamcinolone acetonide 40 MG/ML  Patient tolerance: patient tolerated the procedure well with no immediate complications

## 2021-09-22 NOTE — PROGRESS NOTES
[facility]    Subjective     Chief Complaint   Patient presents with   • Follow-up     Early 4 month f/u (due to vacation) Primary osteoarthritis of right knee, last cortisone injection 6/2/21        HPI    Darrion Mendoza is a 65 y.o. female who follows up for her right knee arthritis. She did have an injection back in 06/2021.    She is requesting an injection before she leaves for [place]. The patient notes that the first injection was significantly helpful, however, her most recent injection provided partial relief and has worn off. She was having pain overall for approximately 7 years, rates pain at 5 or 6 out of 10, and is exacerbated by ambulation, as well as climbing stairs.    Additionally, the patient is going to [place] next week for a trip.    I have reviewed the following portions of the patient's history and agree with: History of Present Illness and Review of Systems    Patient Active Problem List   Diagnosis   • HTN (hypertension)   • Depression   • Prediabetes   • Carpal tunnel syndrome of right wrist   • Annual physical exam   • Gastroesophageal reflux disease without esophagitis   • Need for diphtheria-tetanus-pertussis (Tdap) vaccine   • Post-menopausal   • Bilateral primary osteoarthritis of knee   • Chronic pain of both knees   • Postmenopausal   • Acquired hypothyroidism     Past Medical History:   Diagnosis Date   • Depression    • Esophageal ring    • GERD (gastroesophageal reflux disease)    • H/O LEEP    • Hearing loss in left ear    • HTN (hypertension)    • Kidney stones    • ANDREIA (obstructive sleep apnea)    • Prediabetes       Past Surgical History:   Procedure Laterality Date   • BREAST BIOPSY Right 2006   • CHOLECYSTECTOMY  2014   • COLONOSCOPY  2011   • ESOPHAGEAL DILATATION  2009   • NEPHRECTOMY PARTIAL Right 2014   • UPPER GASTROINTESTINAL ENDOSCOPY  2011      Family History   Problem Relation Age of Onset   • Atrial fibrillation Mother    •  "Hypertension Mother    • Heart disease Father    • Diabetes Father    • Breast cancer Paternal Aunt    • Ovarian cancer Neg Hx      Social History     Socioeconomic History   • Marital status:      Spouse name: Not on file   • Number of children: 4   • Years of education: Not on file   • Highest education level: Not on file   Tobacco Use   • Smoking status: Never Smoker   • Smokeless tobacco: Never Used   Substance and Sexual Activity   • Alcohol use: No   • Drug use: No   • Sexual activity: Yes     Partners: Male     Comment:  for 41 years      Current Outpatient Medications on File Prior to Visit   Medication Sig Dispense Refill   • Aspirin Low Dose 81 MG EC tablet TAKE 1 TABLET BY MOUTH ONCE DAILY 90 tablet 3   • buPROPion XL (WELLBUTRIN XL) 300 MG 24 hr tablet Take 1 tablet by mouth Daily. 30 tablet 2   • levothyroxine (Synthroid) 50 MCG tablet Take 1 tablet by mouth Daily. 90 tablet 3   • lisinopril-hydrochlorothiazide (PRINZIDE,ZESTORETIC) 20-12.5 MG per tablet Take 1 tablet by mouth Daily. 90 tablet 3   • VITAMIN D PO Take  by mouth.       No current facility-administered medications on file prior to visit.      No Known Allergies     Review of Systems     Objective      Physical Exam  BP (!) 166/104   Pulse 86   Ht 162.6 cm (64.02\")   Wt 87.5 kg (192 lb 12.8 oz)   BMI 33.08 kg/m²     Body mass index is 33.08 kg/m².    General:   Mental Status:  Alert   Appearance: Cooperative, in no acute distress   Build and Nutrition: Overweight by BMI female   Orientation: Alert and oriented to person, place and time   Posture: Normal   Gait: Nonantalgic    Integument       • Right knee: No skin lesions, rash, or ecchymosis.    Lower Extremities  • Right Knee:        • Tenderness: No medial or lateral joint line tenderness.       • Swelling: None        • Effusion: 1+       • Crepitus: None       • Atrophy: None       • Range of motion:             • Extension: 5°             • Flexion: 125°        • " Instability: No varus or valgus laxity. Negative anterior drawer.       • Deformities: Varus alignment.        Imaging/Studies  Imaging Results (Last 24 Hours)     Procedure Component Value Units Date/Time    XR Knee 4+ View Right [[id number]] Resulted: 09/22/21 1453     Updated: 09/22/21 1454    Narrative:      Right Knee Radiographs  Indication: right knee pain  Views: Standing AP's and skiers of both knees, with lateral and sunrise   views of the right knee    Comparison: 10/7/2020    Findings:   Para bone contact medial compartment, tricompartment osteophytes, varus   alignment, mild worsening overall compared to the previous imaging.  No   unusual bony features.            Assessment and Plan     Diagnoses and all orders for this visit:    1. Primary osteoarthritis of right knee (Primary)  -     Cancel: XR Knee 4+ View Left  -     XR Knee 4+ View Right  -     Large Joint Arthrocentesis: R knee        1. Primary osteoarthritis of right knee        We discussed her right knee x-rays and that her knee has mildly worsened in compared to her previous x-rays. I advised her that she is a candidate for a right total knee arthroplasty at some point in the future and she does not want to entertain surgery at present. We had a discussion today regarding when the right time for total knee arthroplasty would be based on her symptoms. I recommended we do a right knee injection today and she was agreeable to this.    Procedure Note:  The potential benefits of performing a therapeutic right knee joint injection, as well as potential risks (including, but not limited to infection, swelling, pain, bleeding, bruising, nerve/blood vessel damage, skin color changes, transient elevation in blood glucose levels, and fat atrophy) were discussed with the patient. After informed consent was obtained, a timeout procedure was performed, and the skin on the right knee was prepped with chlorhexidine soap and alcohol, after which ethyl  chloride was applied to the skin at the injection site. Via the anterolateral approach, 1 ml of Kenalog 40 mg/ml mixed with 4 ml 0.5% ropivacaine plain was injected into the knee joint. The patient tolerated the procedure well, experiencing 90% improvement a few minutes following the injection. There were no complications. A Band-Aid was applied to the injection site. Post-procedural instructions were given to the patient and/or their caregiver.    Return in about 4 months (around 1/22/2022).      Transcribed from ambient dictation for Matt Marie MD by Merle Hdz.  09/22/21   16:44 EDT    I have personally performed the services described in this document as transcribed by the above individual, and it is both accurate and complete.  Matt Marie MD  9/23/2021  09:39 EDT

## 2021-09-23 RX ORDER — ROPIVACAINE HYDROCHLORIDE 5 MG/ML
4 INJECTION, SOLUTION EPIDURAL; INFILTRATION; PERINEURAL
Status: COMPLETED | OUTPATIENT
Start: 2021-09-22 | End: 2021-09-22

## 2021-09-23 RX ORDER — TRIAMCINOLONE ACETONIDE 40 MG/ML
40 INJECTION, SUSPENSION INTRA-ARTICULAR; INTRAMUSCULAR
Status: COMPLETED | OUTPATIENT
Start: 2021-09-22 | End: 2021-09-22

## 2021-10-06 ENCOUNTER — TELEPHONE (OUTPATIENT)
Dept: ORTHOPEDIC SURGERY | Facility: CLINIC | Age: 66
End: 2021-10-06

## 2021-10-06 NOTE — TELEPHONE ENCOUNTER
I spoke to patient confirmed her 4 month follow up appointment with Dr. Marie and she stated that she would like to go ahead and have surgery in March or [place]irl. I let her know that she can discuss this with Dr. Marie at her January appointment, she understood.

## 2021-10-06 NOTE — TELEPHONE ENCOUNTER
Caller: Darrion Mendoza    Relationship to patient: Self    Best call back number: [phone]    Chief complaint: SHE IS WANTING TO TAKE STEPS TO GET A RIGHT KNEE REPLACEMENT    Type of visit: SURGERY    Requested date: ASAP    If rescheduling, when is the original appointment: N/A    Additional notes:SHE STATED SHE HAS RECEIVED 3 INJECTIONS AND FEEL LIKE ITS TIME TO GET IT REPLACED

## 2021-11-08 ENCOUNTER — CONSULT (OUTPATIENT)
Dept: ONCOLOGY | Facility: CLINIC | Age: 66
End: 2021-11-08

## 2021-11-08 ENCOUNTER — LAB (OUTPATIENT)
Dept: LAB | Facility: HOSPITAL | Age: 66
End: 2021-11-08

## 2021-11-08 VITALS
TEMPERATURE: 98.1 F | WEIGHT: 192 LBS | DIASTOLIC BLOOD PRESSURE: 89 MMHG | BODY MASS INDEX: 32.78 KG/M2 | OXYGEN SATURATION: 96 % | RESPIRATION RATE: 18 BRPM | HEIGHT: 64 IN | SYSTOLIC BLOOD PRESSURE: 149 MMHG | HEART RATE: 96 BPM

## 2021-11-08 DIAGNOSIS — R79.1 ELEVATED PARTIAL THROMBOPLASTIN TIME (PTT): Primary | ICD-10-CM

## 2021-11-08 DIAGNOSIS — R79.1 ELEVATED PARTIAL THROMBOPLASTIN TIME (PTT): ICD-10-CM

## 2021-11-08 PROCEDURE — 85732 THROMBOPLASTIN TIME PARTIAL: CPT

## 2021-11-08 PROCEDURE — 36415 COLL VENOUS BLD VENIPUNCTURE: CPT

## 2021-11-08 PROCEDURE — 99203 OFFICE O/P NEW LOW 30 MIN: CPT | Performed by: INTERNAL MEDICINE

## 2021-11-08 PROCEDURE — 85730 THROMBOPLASTIN TIME PARTIAL: CPT

## 2021-11-08 PROCEDURE — 85705 THROMBOPLASTIN INHIBITION: CPT

## 2021-11-08 PROCEDURE — 85670 THROMBIN TIME PLASMA: CPT

## 2021-11-08 PROCEDURE — 85613 RUSSELL VIPER VENOM DILUTED: CPT

## 2021-11-08 PROCEDURE — 85598 HEXAGNAL PHOSPH PLTLT NEUTRL: CPT

## 2021-11-08 NOTE — PROGRESS NOTES
CHIEF COMPLAINT: Elevated PTT without bleeding or clotting tendencies    REASON FOR REFERRAL: Elevated PTT without bleeding or clotting tendencies      RECORDS OBTAINED  Records of the patients history including those obtained from primary care were reviewed and summarized in detail.    Oncology/Hematology History Overview Note   1.  Elevated PTT  2.  Hypertension  3.  Hypothyroidism  4.  Reflux  5.  Osteoarthritis    Hematology history timeline:  -5/13/2021 PTT 70.1 with normal CBC and PT.  TSH running high at 5.  Normal B12  -9/9/2021 PTT 69.6.  INR normal.  CMP normal.  CBC normal.    -11/8/2021 [facility] hematology consultation: She was getting enrolled on a Alzheimer's prevention study at .  Screening tests included a PTT which was abnormal in May and again in September and she was taken off the study.  She has not had easy bruising or petechiae.  She has not had venous or arterial thrombotic events.  No family history of easy bleeding or bruising and no family history of propensity of clotting.  Only a family history of Alzheimer's.  We will start with a lupus anticoagulant and a PTT mixing study and I will see her back in a few days to go over that and then that we will decide whether or not there is a circulating inhibitor procoagulant or whether this is a circulating inhibitor that we need to do a [place] assay on to discern the magnitude of factor deficiency due to antibody inhibition.         HISTORY OF PRESENT ILLNESS:  The patient is a 65 y.o.  female, referred for elevated PTT found on screening test for study for Alzheimer's prevention with strong family history of Alzheimer's but no family history of bleeding or clotting tendencies nor personal history of such.    REVIEW OF SYSTEMS:  No somatic complaints    Past Medical History:   Diagnosis Date   • Depression    • Esophageal ring    • GERD (gastroesophageal reflux disease)    • H/O LEEP    • Hearing loss in left ear    • HTN (hypertension)    •  "Kidney stones    • ANDREIA (obstructive sleep apnea)    • Prediabetes      Past Surgical History:   Procedure Laterality Date   • BREAST BIOPSY Right 2006   • CHOLECYSTECTOMY  2014   • COLONOSCOPY  2011   • ESOPHAGEAL DILATATION  2009   • NEPHRECTOMY PARTIAL Right 2014   • UPPER GASTROINTESTINAL ENDOSCOPY  2011       Current Outpatient Medications on File Prior to Visit   Medication Sig Dispense Refill   • Aspirin Low Dose 81 MG EC tablet TAKE 1 TABLET BY MOUTH ONCE DAILY 90 tablet 3   • buPROPion XL (WELLBUTRIN XL) 300 MG 24 hr tablet Take 1 tablet by mouth Daily. 30 tablet 2   • levothyroxine (Synthroid) 50 MCG tablet Take 1 tablet by mouth Daily. 90 tablet 3   • lisinopril-hydrochlorothiazide (PRINZIDE,ZESTORETIC) 20-12.5 MG per tablet Take 1 tablet by mouth Daily. 90 tablet 3   • VITAMIN D PO Take  by mouth.       No current facility-administered medications on file prior to visit.       No Known Allergies    Social History     Socioeconomic History   • Marital status:    • Number of children: 4   Tobacco Use   • Smoking status: Never Smoker   • Smokeless tobacco: Never Used   Substance and Sexual Activity   • Alcohol use: No   • Drug use: No   • Sexual activity: Yes     Partners: Male     Comment:  for 41 years       Family History   Problem Relation Age of Onset   • Atrial fibrillation Mother    • Hypertension Mother    • Heart disease Father    • Diabetes Father    • Breast cancer Paternal Aunt    • Ovarian cancer Neg Hx        PHYSICAL EXAM:  Lungs clear heart regular rate and rhythm.  No petechiae or ecchymoses.  No rash.  No arthralgias or synovitis.    /89 Comment: (R) arm  Pulse 96   Temp 98.1 °F (36.7 °C)   Resp 18   Ht 162.6 cm (64\")   Wt 87.1 kg (192 lb)   SpO2 96%   BMI 32.96 kg/m²     ECOG score: 0               Lab Results   Component Value Date    HGB 13.5 01/13/2020    HCT 39.9 01/13/2020    MCV 89.9 01/13/2020     01/13/2020    WBC 6.95 01/13/2020    NEUTROABS 4.73 " 01/13/2020    LYMPHSABS 1.44 01/13/2020    MONOSABS 0.57 01/13/2020    EOSABS 0.13 01/13/2020    BASOSABS 0.05 01/13/2020     Lab Results   Component Value Date    GLUCOSE 111 (H) 01/13/2020    BUN 15 01/13/2020    CREATININE 1.16 (H) 01/13/2020     01/13/2020    K 4.6 01/13/2020     01/13/2020    CO2 26.5 01/13/2020    CALCIUM 9.5 01/13/2020    PROTEINTOT 6.7 10/29/2017    ALBUMIN 4.60 01/13/2020    BILITOT 0.4 01/13/2020    ALKPHOS 81 01/13/2020    AST 14 01/13/2020    ALT 20 01/13/2020         Assessment/Plan   1.  Elevated PTT  2.  Hypertension  3.  Hypothyroidism  4.  Reflux  5.  Osteoarthritis    Hematology history timeline:  -5/13/2021 PTT 70.1 with normal CBC and PT.  TSH running high at 5.  Normal B12  -9/9/2021 PTT 69.6.  INR normal.  CMP normal.  CBC normal.    -11/8/2021 [facility]ology consultation: She was getting enrolled on a Alzheimer's prevention study at .  Screening tests included a PTT which was abnormal in May and again in September and she was taken off the study.  She has not had easy bruising or petechiae.  She has not had venous or arterial thrombotic events.  No family history of easy bleeding or bruising and no family history of propensity of clotting.  Only a family history of Alzheimer's.  We will start with a lupus anticoagulant and a PTT mixing study and I will see her back in a few days to go over that and then that we will decide whether or not there is a circulating inhibitor procoagulant or whether this is a circulating inhibitor that we need to do a [place] assay on to discern the magnitude of factor deficiency due to antibody inhibition.    Total time of care today inclusive of time spent today prior to her arrival reviewing past medical records and during visit interviewing her as to the history of this test and her and the pathophysiology mechanisms of possible elevation and after visit putting forth the plan as outlined above took 30 minutes of patient care  time today.          Elmer Glover MD    11/8/2021

## 2021-11-10 LAB
APTT PPP: 66.8 SECONDS (ref 22–39)
INR PPP: 1.02 (ref 0.85–1.16)
INTERPRETATION: ABNORMAL
PROTHROMBIN TIME: 13.1 SECONDS (ref 11.4–14.4)
PTT 1:1 60MIN: 50.5 SECONDS (ref 22–39)
PTT 1:1 IMMED: 47.9 SECONDS (ref 22–39)

## 2021-11-12 ENCOUNTER — OFFICE VISIT (OUTPATIENT)
Dept: ONCOLOGY | Facility: CLINIC | Age: 66
End: 2021-11-12

## 2021-11-12 ENCOUNTER — LAB (OUTPATIENT)
Dept: LAB | Facility: HOSPITAL | Age: 66
End: 2021-11-12

## 2021-11-12 VITALS
OXYGEN SATURATION: 97 % | RESPIRATION RATE: 18 BRPM | BODY MASS INDEX: 33.29 KG/M2 | HEART RATE: 85 BPM | DIASTOLIC BLOOD PRESSURE: 83 MMHG | TEMPERATURE: 97.8 F | SYSTOLIC BLOOD PRESSURE: 148 MMHG | WEIGHT: 195 LBS | HEIGHT: 64 IN

## 2021-11-12 DIAGNOSIS — R79.1 ELEVATED PARTIAL THROMBOPLASTIN TIME (PTT): Primary | Chronic | ICD-10-CM

## 2021-11-12 DIAGNOSIS — R79.1 ELEVATED PARTIAL THROMBOPLASTIN TIME (PTT): Chronic | ICD-10-CM

## 2021-11-12 PROCEDURE — 86146 BETA-2 GLYCOPROTEIN ANTIBODY: CPT

## 2021-11-12 PROCEDURE — 99214 OFFICE O/P EST MOD 30 MIN: CPT | Performed by: INTERNAL MEDICINE

## 2021-11-12 PROCEDURE — 36415 COLL VENOUS BLD VENIPUNCTURE: CPT

## 2021-11-12 PROCEDURE — 86147 CARDIOLIPIN ANTIBODY EA IG: CPT

## 2021-11-12 NOTE — PROGRESS NOTES
CHIEF COMPLAINT: Follow-up cryptogenic asymptomatic elevated PTT    Problem List:  Oncology/Hematology History Overview Note   1.  Elevated PTT  2.  Hypertension  3.  Hypothyroidism  4.  Reflux  5.  Osteoarthritis    Hematology history timeline:  -5/13/2021 PTT 70.1 with normal CBC and PT.  TSH running high at 5.  Normal B12  -9/9/2021 PTT 69.6.  INR normal.  CMP normal.  CBC normal.    -11/8/2021 [facility] hematology consultation: She was getting enrolled on a Alzheimer's prevention study at .  Screening tests included a PTT which was abnormal in May and again in September and she was taken off the study.  She has not had easy bruising or petechiae.  She has not had venous or arterial thrombotic events.  No family history of easy bleeding or bruising and no family history of propensity of clotting.  Only a family history of Alzheimer's.  We will start with a lupus anticoagulant and a PTT mixing study and I will see her back in a few days to go over that and then that we will decide whether or not there is a circulating inhibitor procoagulant or whether this is a circulating inhibitor that we need to do a [place] assay on to discern the magnitude of factor deficiency due to antibody inhibition.    -11/12/2021 [facility] medical oncology follow-up visit: Her PTT mixing study shows immediate but only partial correction with baseline PTT 66.8 and with one-to-one dilution was 47.9 immediately and at 60 minutes was 50.5 consistent with a inhibitor pattern.  Lupus anticoagulant is pending.  If that comes back negative then we will have to do [place] assay to assess for antibody to clotting factors factors.  If this is a lupus anticoagulant, then she actually is not at risk of bleeding but is a procoagulant.  If this is a circulating inhibitor to clotting factors, she has had no easy bruising or bleeding which would be a little odd for that scenario.  In the absence of clinical complications from a lupus anticoagulant, we  "would not necessarily prophylactically anticoagulate unless there were evidences of venous or arterial thrombosis.  I will add anticardiolipin antibody and beta-2 glycoprotein 1 to labs today.     Elevated partial thromboplastin time (PTT)       HISTORY OF PRESENT ILLNESS:  The patient is a 65 y.o. female, here for follow up on management of cryptogenic, asymptomatic elevated PTT    Past Medical History:   Diagnosis Date   • Depression    • Esophageal ring    • GERD (gastroesophageal reflux disease)    • H/O LEEP    • Hearing loss in left ear    • HTN (hypertension)    • Kidney stones    • ANDREIA (obstructive sleep apnea)    • Prediabetes      Past Surgical History:   Procedure Laterality Date   • BREAST BIOPSY Right 2006   • CHOLECYSTECTOMY  2014   • COLONOSCOPY  2011   • ESOPHAGEAL DILATATION  2009   • NEPHRECTOMY PARTIAL Right 2014   • UPPER GASTROINTESTINAL ENDOSCOPY  2011       No Known Allergies    Family History and Social History reviewed and changed as necessary    REVIEW OF SYSTEM:   No new somatic complaints    PHYSICAL EXAM:  No petechiae or ecchymoses.  No signs or symptoms of lower extremity venous valvular insufficiency of any major import and no signs  of venous thrombosis in lower extremities.    Vitals:    11/12/21 0842   BP: 148/83   Pulse: 85   Resp: 18   Temp: 97.8 °F (36.6 °C)   SpO2: 97%   Weight: 88.5 kg (195 lb)   Height: 162.6 cm (64\")     Vitals:    11/12/21 0842   PainSc: 0-No pain          ECOG score: 0           Vitals reviewed.    ECOG: (0) Fully Active - Able to Carry On All Pre-disease Performance Without Restriction    Lab Results   Component Value Date    HGB 13.5 01/13/2020    HCT 39.9 01/13/2020    MCV 89.9 01/13/2020     01/13/2020    WBC 6.95 01/13/2020    NEUTROABS 4.73 01/13/2020    LYMPHSABS 1.44 01/13/2020    MONOSABS 0.57 01/13/2020    EOSABS 0.13 01/13/2020    BASOSABS 0.05 01/13/2020       Lab Results   Component Value Date    GLUCOSE 111 (H) 01/13/2020    BUN 15 " 01/13/2020    CREATININE 1.16 (H) 01/13/2020     01/13/2020    K 4.6 01/13/2020     01/13/2020    CO2 26.5 01/13/2020    CALCIUM 9.5 01/13/2020    PROTEINTOT 6.7 10/29/2017    ALBUMIN 4.60 01/13/2020    BILITOT 0.4 01/13/2020    ALKPHOS 81 01/13/2020    AST 14 01/13/2020    ALT 20 01/13/2020             ASSESSMENT & PLAN:  1.  Elevated PTT  2.  Hypertension  3.  Hypothyroidism  4.  Reflux  5.  Osteoarthritis  6.  History of partial nephrectomy, cholecystectomy, esophageal dilations, colonoscopy, upper GI endoscopy, and breast biopsies all done since 2006 most recent being the nephrectomy 2014 without bleeding or clotting complications     Hematology history timeline:  -5/13/2021 PTT 70.1 with normal CBC and PT.  TSH running high at 5.  Normal B12  -9/9/2021 PTT 69.6.  INR normal.  CMP normal.  CBC normal.    -11/8/2021 [facility] hematology consultation: She was getting enrolled on a Alzheimer's prevention study at .  Screening tests included a PTT which was abnormal in May and again in September and she was taken off the study.  She has not had easy bruising or petechiae.  She has not had venous or arterial thrombotic events.  No family history of easy bleeding or bruising and no family history of propensity of clotting.  Only a family history of Alzheimer's.  We will start with a lupus anticoagulant and a PTT mixing study and I will see her back in a few days to go over that and then that we will decide whether or not there is a circulating inhibitor procoagulant or whether this is a circulating inhibitor that we need to do a [place] assay on to discern the magnitude of factor deficiency due to antibody inhibition.    -11/12/2021 [facility] medical oncology follow-up visit: Her PTT mixing study shows immediate but only partial correction with baseline PTT 66.8 and with one-to-one dilution was 47.9 immediately and at 60 minutes was 50.5 consistent with a inhibitor pattern.  Lupus anticoagulant is pending.   If that comes back negative then we will have to do [place] assay to assess for antibody to clotting factors factors.  If this is a lupus anticoagulant, then she actually is not at risk of bleeding but is a procoagulant.  If this is a circulating inhibitor to clotting factors, she has had no easy bruising or bleeding which would be a little odd for that scenario.  In the absence of clinical complications from a lupus anticoagulant, we would not necessarily prophylactically anticoagulate unless there were evidences of venous or arterial thrombosis.  I will add anticardiolipin antibody and beta-2 glycoprotein 1 to labs today.    Total time of care today inclusive of time spent today prior to her arrival reviewing interval data as outlined and during visit explaining the difference between a lupus anticoagulant inhibitor versus clotting factor antibody and management thereof and after visit setting up ongoing work-up as outlined above took 30 minutes of patient care time throughout the day today.  Elmer Glover MD    11/12/2021

## 2021-11-13 LAB
APTT HEX PL PPP: 30 SEC (ref 0–11)
APTT SCREEN TO CONFIRM RATIO: 1.84 RATIO (ref 0–1.34)
B2 GLYCOPROT1 IGA SER-ACNC: 22 GPI IGA UNITS (ref 0–25)
B2 GLYCOPROT1 IGG SER-ACNC: <9 GPI IGG UNITS (ref 0–20)
B2 GLYCOPROT1 IGM SER-ACNC: 149 GPI IGM UNITS (ref 0–32)
CARDIOLIPIN IGG SER IA-ACNC: <9 GPL U/ML (ref 0–14)
CARDIOLIPIN IGM SER IA-ACNC: 69 MPL U/ML (ref 0–12)
CONFIRM APTT/NORMAL: 73.6 SEC (ref 0–47.6)
DRVVT SCREEN TO CONFIRM RATIO: 1.8 RATIO (ref 0.8–1.2)
LA 2 SCREEN W REFLEX-IMP: ABNORMAL
MIXING APTT: 76.7 SEC (ref 0–48.9)
MIXING DRVVT: 56.7 SEC (ref 0–40.4)
SCREEN APTT: 86.1 SEC (ref 0–51.9)
SCREEN DRVVT: 64 SEC (ref 0–47)
THROMBIN TIME: 18.9 SEC (ref 0–23)

## 2021-11-22 ENCOUNTER — LAB (OUTPATIENT)
Dept: LAB | Facility: HOSPITAL | Age: 66
End: 2021-11-22

## 2021-11-22 ENCOUNTER — OFFICE VISIT (OUTPATIENT)
Dept: ONCOLOGY | Facility: CLINIC | Age: 66
End: 2021-11-22

## 2021-11-22 VITALS
OXYGEN SATURATION: 97 % | HEIGHT: 64 IN | SYSTOLIC BLOOD PRESSURE: 163 MMHG | HEART RATE: 86 BPM | RESPIRATION RATE: 18 BRPM | DIASTOLIC BLOOD PRESSURE: 87 MMHG | WEIGHT: 195 LBS | TEMPERATURE: 97.5 F | BODY MASS INDEX: 33.29 KG/M2

## 2021-11-22 DIAGNOSIS — R76.0 ANTIPHOSPHOLIPID ANTIBODY POSITIVE: ICD-10-CM

## 2021-11-22 DIAGNOSIS — R76.0 ANTIPHOSPHOLIPID ANTIBODY POSITIVE: Primary | ICD-10-CM

## 2021-11-22 LAB
ALBUMIN SERPL-MCNC: 5 G/DL (ref 3.5–5.2)
ALBUMIN/GLOB SERPL: 2 G/DL
ALP SERPL-CCNC: 97 U/L (ref 39–117)
ALT SERPL W P-5'-P-CCNC: 28 U/L (ref 1–33)
ANION GAP SERPL CALCULATED.3IONS-SCNC: 12 MMOL/L (ref 5–15)
AST SERPL-CCNC: 20 U/L (ref 1–32)
BACTERIA UR QL AUTO: NORMAL /HPF
BILIRUB SERPL-MCNC: 0.4 MG/DL (ref 0–1.2)
BILIRUB UR QL STRIP: NEGATIVE
BUN SERPL-MCNC: 15 MG/DL (ref 8–23)
BUN/CREAT SERPL: 14 (ref 7–25)
CALCIUM SPEC-SCNC: 9.8 MG/DL (ref 8.6–10.5)
CHLORIDE SERPL-SCNC: 103 MMOL/L (ref 98–107)
CLARITY UR: CLEAR
CO2 SERPL-SCNC: 27 MMOL/L (ref 22–29)
COLOR UR: YELLOW
CREAT SERPL-MCNC: 1.07 MG/DL (ref 0.57–1)
CRP SERPL-MCNC: 0.33 MG/DL (ref 0–0.5)
ERYTHROCYTE [DISTWIDTH] IN BLOOD BY AUTOMATED COUNT: 12.7 % (ref 12.3–15.4)
ERYTHROCYTE [SEDIMENTATION RATE] IN BLOOD: 7 MM/HR (ref 0–30)
GFR SERPL CREATININE-BSD FRML MDRD: 51 ML/MIN/1.73
GLOBULIN UR ELPH-MCNC: 2.5 GM/DL
GLUCOSE SERPL-MCNC: 99 MG/DL (ref 65–99)
GLUCOSE UR STRIP-MCNC: NEGATIVE MG/DL
HCT VFR BLD AUTO: 41.1 % (ref 34–46.6)
HGB BLD-MCNC: 13.6 G/DL (ref 12–15.9)
HGB UR QL STRIP.AUTO: NEGATIVE
HYALINE CASTS UR QL AUTO: NORMAL /LPF
KETONES UR QL STRIP: NEGATIVE
LEUKOCYTE ESTERASE UR QL STRIP.AUTO: ABNORMAL
LYMPHOCYTES # BLD AUTO: 1.7 10*3/MM3 (ref 0.7–3.1)
LYMPHOCYTES NFR BLD AUTO: 20.9 % (ref 19.6–45.3)
MCH RBC QN AUTO: 29.9 PG (ref 26.6–33)
MCHC RBC AUTO-ENTMCNC: 33.2 G/DL (ref 31.5–35.7)
MCV RBC AUTO: 90 FL (ref 79–97)
MONOCYTES # BLD AUTO: 0.5 10*3/MM3 (ref 0.1–0.9)
MONOCYTES NFR BLD AUTO: 6.4 % (ref 5–12)
NEUTROPHILS NFR BLD AUTO: 5.8 10*3/MM3 (ref 1.7–7)
NEUTROPHILS NFR BLD AUTO: 72.7 % (ref 42.7–76)
NITRITE UR QL STRIP: NEGATIVE
PH UR STRIP.AUTO: 5.5 [PH] (ref 5–8)
PLATELET # BLD AUTO: 210 10*3/MM3 (ref 140–450)
PMV BLD AUTO: 8 FL (ref 6–12)
POTASSIUM SERPL-SCNC: 3.6 MMOL/L (ref 3.5–5.2)
PROT SERPL-MCNC: 7.5 G/DL (ref 6–8.5)
PROT UR QL STRIP: NEGATIVE
RBC # BLD AUTO: 4.57 10*6/MM3 (ref 3.77–5.28)
RBC # UR STRIP: NORMAL /HPF
REF LAB TEST METHOD: NORMAL
SODIUM SERPL-SCNC: 142 MMOL/L (ref 136–145)
SP GR UR STRIP: 1.01 (ref 1–1.03)
SQUAMOUS #/AREA URNS HPF: NORMAL /HPF
UROBILINOGEN UR QL STRIP: ABNORMAL
WBC # UR STRIP: NORMAL /HPF
WBC NRBC COR # BLD: 8 10*3/MM3 (ref 3.4–10.8)

## 2021-11-22 PROCEDURE — 86160 COMPLEMENT ANTIGEN: CPT

## 2021-11-22 PROCEDURE — 85025 COMPLETE CBC W/AUTO DIFF WBC: CPT

## 2021-11-22 PROCEDURE — 86140 C-REACTIVE PROTEIN: CPT

## 2021-11-22 PROCEDURE — 99215 OFFICE O/P EST HI 40 MIN: CPT | Performed by: INTERNAL MEDICINE

## 2021-11-22 PROCEDURE — 86200 CCP ANTIBODY: CPT

## 2021-11-22 PROCEDURE — 81001 URINALYSIS AUTO W/SCOPE: CPT

## 2021-11-22 PROCEDURE — 80053 COMPREHEN METABOLIC PANEL: CPT

## 2021-11-22 PROCEDURE — 86038 ANTINUCLEAR ANTIBODIES: CPT

## 2021-11-22 PROCEDURE — 36415 COLL VENOUS BLD VENIPUNCTURE: CPT

## 2021-11-22 PROCEDURE — 85652 RBC SED RATE AUTOMATED: CPT

## 2021-11-22 PROCEDURE — 86431 RHEUMATOID FACTOR QUANT: CPT

## 2021-11-22 NOTE — PROGRESS NOTES
CHIEF COMPLAINT: Circulating inhibitor of PTT coincidentally found    Problem List:  Oncology/Hematology History Overview Note   1.  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  2.  Hypertension  3.  Hypothyroidism  4.  Reflux  5.  Osteoarthritis  6.  History of partial nephrectomy, cholecystectomy, esophageal dilations, colonoscopy, upper GI endoscopy, and breast biopsies all done since 2006 most recent being the nephrectomy 2014 without bleeding or clotting complications     Hematology history timeline:  -5/13/2021 PTT 70.1 with normal CBC and PT.  TSH running high at 5.  Normal B12  -9/9/2021 PTT 69.6.  INR normal.  CMP normal.  CBC normal.    -11/8/2021 [facility] hematology consultation: She was getting enrolled on a Alzheimer's prevention study at .  Screening tests included a PTT which was abnormal in May and again in September and she was taken off the study.  She has not had easy bruising or petechiae.  She has not had venous or arterial thrombotic events.  No family history of easy bleeding or bruising and no family history of propensity of clotting.  Only a family history of Alzheimer's.  We will start with a lupus anticoagulant and a PTT mixing study and I will see her back in a few days to go over that and then that we will decide whether or not there is a circulating inhibitor procoagulant or whether this is a circulating inhibitor that we need to do a [place] assay on to discern the magnitude of factor deficiency due to antibody inhibition.    -11/12/2021 [facility] medical oncology follow-up visit: Her PTT mixing study shows immediate but only partial correction with baseline PTT 66.8 and with one-to-one dilution was 47.9 immediately and at 60 minutes was 50.5 consistent with a inhibitor pattern.  Lupus anticoagulant is pending.  If that comes back negative then we will have to do [place] assay to assess for antibody to clotting factors factors.  If this is a lupus  anticoagulant, then she actually is not at risk of bleeding but is a procoagulant.  If this is a circulating inhibitor to clotting factors, she has had no easy bruising or bleeding which would be a little odd for that scenario.  In the absence of clinical complications from a lupus anticoagulant, we would not necessarily prophylactically anticoagulate unless there were evidences of venous or arterial thrombosis.  I will add anticardiolipin antibody and beta-2 glycoprotein 1 to labs today.    -11/22/2021 [facility] oncology follow-up visit: Her beta-2 glycoprotein 1 IgM was positive at 149.  Her lupus anticoagulant was also positive.  Her anticardiolipin antibody was low medium positive at 69.  She has had no dylon thrombotic or embolic arterial or venous events.  Microvascular thrombotic events causing dementia would be an unusual manifestation of this problem.  This does account for her elevated PTT and actually does not put her at risk of bleeding from procedures but puts her at risk of clotting.  I would vouch from this that anything she needs to do from the Alzheimer's prevention study standpoint would be reasonable to proceed as far as I am concerned but I do not know the mechanism of action of the investigational agent so I cannot say for sure.  I will check her CCP and DAMIAN though she has no rheumatological complaints along with a urinalysis, C3, C4, sedimentation rate, and C-reactive protein.  She has had no recent bacterial or viral illnesses that would cause false positivity.  She has not been on amoxicillin or procainamide or Dilantin that would cause false positivity.  I will repeat her beta-2 glycoprotein 1, anticardiolipin antibody, and lupus anticoagulant again in 3 months to make sure that this is persistent.  If it is then she is at increased risk for clotting events but in the absence of clotting events, there is no data to suggest that prophylactically anticoagulating a person is helpful.   "Some anticardiolipin antibody syndrome issues can be mitigated by the aspirin that she is already taking but if she does have a dylon thrombotic or embolic event then she would need lifetime anticoagulation at that junction.  I will also check her CBC and CMP and pay attention to her creatinine which in January 2020 was slightly elevated at 1.16 with GFR of 47 that could go along with a collagen vascular disorder.     Antiphospholipid antibody positive       HISTORY OF PRESENT ILLNESS:  The patient is a 65 y.o. female, here for follow up on management of coincidentally found circulating inhibitor of PTT with positive antiphospholipid antibody, lupus anticoagulant, beta-2 glycoprotein 1 without thrombotic event    Past Medical History:   Diagnosis Date   • Depression    • Esophageal ring    • GERD (gastroesophageal reflux disease)    • H/O LEEP    • Hearing loss in left ear    • HTN (hypertension)    • Kidney stones    • ANDREIA (obstructive sleep apnea)    • Prediabetes      Past Surgical History:   Procedure Laterality Date   • BREAST BIOPSY Right 2006   • CHOLECYSTECTOMY  2014   • COLONOSCOPY  2011   • ESOPHAGEAL DILATATION  2009   • NEPHRECTOMY PARTIAL Right 2014   • UPPER GASTROINTESTINAL ENDOSCOPY  2011       No Known Allergies    Family History and Social History reviewed and changed as necessary    REVIEW OF SYSTEM:   No rheumatological complaints    PHYSICAL EXAM:  Lungs clear heart regular rate and rhythm.  No malar rash.  No synovitis.    Vitals:    11/22/21 0919   BP: 163/87   Pulse: 86   Resp: 18   Temp: 97.5 °F (36.4 °C)   SpO2: 97%   Weight: 88.5 kg (195 lb)   Height: 162.6 cm (64\")     Vitals:    11/22/21 0919   PainSc: 0-No pain          ECOG score: 0           Vitals reviewed.    Lab Results   Component Value Date    HGB 13.5 01/13/2020    HCT 39.9 01/13/2020    MCV 89.9 01/13/2020     01/13/2020    WBC 6.95 01/13/2020    NEUTROABS 4.73 01/13/2020    LYMPHSABS 1.44 01/13/2020    MONOSABS 0.57 " 01/13/2020    EOSABS 0.13 01/13/2020    BASOSABS 0.05 01/13/2020       Lab Results   Component Value Date    GLUCOSE 111 (H) 01/13/2020    BUN 15 01/13/2020    CREATININE 1.16 (H) 01/13/2020     01/13/2020    K 4.6 01/13/2020     01/13/2020    CO2 26.5 01/13/2020    CALCIUM 9.5 01/13/2020    PROTEINTOT 6.7 10/29/2017    ALBUMIN 4.60 01/13/2020    BILITOT 0.4 01/13/2020    ALKPHOS 81 01/13/2020    AST 14 01/13/2020    ALT 20 01/13/2020             ASSESSMENT & PLAN:  1.  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  2.  Hypertension  3.  Hypothyroidism  4.  Reflux  5.  Osteoarthritis  6.  History of partial nephrectomy, cholecystectomy, esophageal dilations, colonoscopy, upper GI endoscopy, and breast biopsies all done since 2006 most recent being the nephrectomy 2014 without bleeding or clotting complications     Hematology history timeline:  -5/13/2021 PTT 70.1 with normal CBC and PT.  TSH running high at 5.  Normal B12  -9/9/2021 PTT 69.6.  INR normal.  CMP normal.  CBC normal.    -11/8/2021 [facility] hematology consultation: She was getting enrolled on a Alzheimer's prevention study at .  Screening tests included a PTT which was abnormal in May and again in September and she was taken off the study.  She has not had easy bruising or petechiae.  She has not had venous or arterial thrombotic events.  No family history of easy bleeding or bruising and no family history of propensity of clotting.  Only a family history of Alzheimer's.  We will start with a lupus anticoagulant and a PTT mixing study and I will see her back in a few days to go over that and then that we will decide whether or not there is a circulating inhibitor procoagulant or whether this is a circulating inhibitor that we need to do a [place] assay on to discern the magnitude of factor deficiency due to antibody inhibition.    -11/12/2021 [facility] medical oncology follow-up visit: Her PTT mixing study  shows immediate but only partial correction with baseline PTT 66.8 and with one-to-one dilution was 47.9 immediately and at 60 minutes was 50.5 consistent with a inhibitor pattern.  Lupus anticoagulant is pending.  If that comes back negative then we will have to do [place] assay to assess for antibody to clotting factors factors.  If this is a lupus anticoagulant, then she actually is not at risk of bleeding but is a procoagulant.  If this is a circulating inhibitor to clotting factors, she has had no easy bruising or bleeding which would be a little odd for that scenario.  In the absence of clinical complications from a lupus anticoagulant, we would not necessarily prophylactically anticoagulate unless there were evidences of venous or arterial thrombosis.  I will add anticardiolipin antibody and beta-2 glycoprotein 1 to labs today.    -11/22/2021 [facility] oncology follow-up visit: Her beta-2 glycoprotein 1 IgM was positive at 149.  Her lupus anticoagulant was also positive.  Her anticardiolipin antibody was low medium positive at 69.  She has had no dylon thrombotic or embolic arterial or venous events.  Microvascular thrombotic events causing dementia would be an unusual manifestation of this problem.  This does account for her elevated PTT and actually does not put her at risk of bleeding from procedures but puts her at risk of clotting.  I would vouch from this that anything she needs to do from the Alzheimer's prevention study standpoint would be reasonable to proceed as far as I am concerned but I do not know the mechanism of action of the investigational agent so I cannot say for sure.  I will check her CCP and DAMIAN though she has no rheumatological complaints along with a urinalysis, C3, C4, sedimentation rate, and C-reactive protein.  She has had no recent bacterial or viral illnesses that would cause false positivity.  She has not been on amoxicillin or procainamide or Dilantin that would cause  false positivity.  I will repeat her beta-2 glycoprotein 1, anticardiolipin antibody, and lupus anticoagulant again in 3 months to make sure that this is persistent.  If it is then she is at increased risk for clotting events but in the absence of clotting events, there is no data to suggest that prophylactically anticoagulating a person is helpful.  Some anticardiolipin antibody syndrome issues can be mitigated by the aspirin that she is already taking but if she does have a dylon thrombotic or embolic event then she would need lifetime anticoagulation at that junction.  I will also check her CBC and CMP and pay attention to her creatinine which in January 2020 was slightly elevated at 1.16 with GFR of 47 that could go along with a collagen vascular disorder.  Depending on the above labs, I may end up referring her to rheumatology as well.    Total time of care today inclusive of time spent today prior to her arrival reviewing interval labs as outlined above and during visit translating this and the pathophysiology of antiphospholipid antibody without dylon syndrome and then general guidelines to not anticoagulate in the absence of dylon thrombotic events and plans for work-up and follow-up as outlined and possible referral to rheumatologist and after visit instituting this plan as outlined above took 45 min of total patient care time throughout the day today.  Emler Glover MD    11/22/2021

## 2021-11-23 LAB — CCP IGA+IGG SERPL IA-ACNC: 23 UNITS (ref 0–19)

## 2021-11-24 LAB
ANA SER QL: NEGATIVE
C3 SERPL-MCNC: 182 MG/DL (ref 82–167)
C4 SERPL-MCNC: 46 MG/DL (ref 12–38)
RHEUMATOID FACT SERPL-ACNC: <10 IU/ML

## 2021-12-21 DIAGNOSIS — I10 ESSENTIAL HYPERTENSION: ICD-10-CM

## 2021-12-21 NOTE — TELEPHONE ENCOUNTER
Last visit 07/13/21  Next visit none  Labs 11/22/21    lisinopril-hydrochlorothiazide (PRINZIDE,ZESTORETIC) 20-12.5 MG per tablet was last filled on 11/03/20

## 2021-12-22 RX ORDER — LISINOPRIL AND HYDROCHLOROTHIAZIDE 20; 12.5 MG/1; MG/1
TABLET ORAL
Qty: 90 TABLET | Refills: 1 | Status: SHIPPED | OUTPATIENT
Start: 2021-12-22 | End: 2022-06-24

## 2022-01-24 ENCOUNTER — OFFICE VISIT (OUTPATIENT)
Dept: ORTHOPEDIC SURGERY | Facility: CLINIC | Age: 67
End: 2022-01-24

## 2022-01-24 VITALS
WEIGHT: 197 LBS | HEIGHT: 64 IN | SYSTOLIC BLOOD PRESSURE: 128 MMHG | DIASTOLIC BLOOD PRESSURE: 84 MMHG | BODY MASS INDEX: 33.63 KG/M2

## 2022-01-24 DIAGNOSIS — M17.11 PRIMARY OSTEOARTHRITIS OF RIGHT KNEE: Primary | ICD-10-CM

## 2022-01-24 PROCEDURE — 99214 OFFICE O/P EST MOD 30 MIN: CPT | Performed by: ORTHOPAEDIC SURGERY

## 2022-01-24 RX ORDER — PREGABALIN 150 MG/1
150 CAPSULE ORAL ONCE
Status: CANCELLED | OUTPATIENT
Start: 2022-01-24 | End: 2022-01-24

## 2022-01-24 RX ORDER — ACETAMINOPHEN 325 MG/1
1000 TABLET ORAL ONCE
Status: CANCELLED | OUTPATIENT
Start: 2022-01-24 | End: 2022-01-24

## 2022-01-24 RX ORDER — MELOXICAM 7.5 MG/1
15 TABLET ORAL ONCE
Status: CANCELLED | OUTPATIENT
Start: 2022-01-24 | End: 2022-01-24

## 2022-01-24 RX ORDER — ANTISEPTIC SURGICAL SCRUB 0.04 MG/ML
SOLUTION TOPICAL DAILY
Qty: 237 ML | Refills: 0 | Status: SHIPPED | OUTPATIENT
Start: 2022-01-24 | End: 2023-03-06

## 2022-01-24 NOTE — PROGRESS NOTES
[facility] [place] [facility] Note    Subjective     Chief Complaint   Patient presents with   • Follow-up     4 month f/u Primary osteoarthritis of right knee, last cortisone 9/22/21        HPI    It has been 4  month(s) since Ms. Mendoza's last visit. She returns to clinic today for follow-up of right knee arthritis. The issue has been ongoing for 7 year(s). She rates her pain a 3-7/10 on the pain scale. Previous/current treatments: bracing, physical therapy and steroid injection (last injection 9/22/21). Current symptoms: pain and stiffness. The pain is worse with walking, standing, climbing stairs and rising from seated position; sitting and lying down improve the pain. Overall, she is doing the same.  Brief relief with the last steroid injection.  She has reached the point where she would like to proceed with knee replacement surgery.  She has exhausted conservative treatment.  She currently sees Dr. Glover, who is evaluating her for possible coagulopathy, although she is not had any clotting issues in the past.  Patient is not currently on blood thinners.  She does have help at home from both her  and a daughter.     I have reviewed the following portions of the patient's history and agree with: History of Present Illness and Review of Systems    Patient Active Problem List   Diagnosis   • HTN (hypertension)   • Depression   • Prediabetes   • Carpal tunnel syndrome of right wrist   • Annual physical exam   • Gastroesophageal reflux disease without esophagitis   • Need for diphtheria-tetanus-pertussis (Tdap) vaccine   • Post-menopausal   • Bilateral primary osteoarthritis of knee   • Chronic pain of both knees   • Postmenopausal   • Acquired hypothyroidism   • Antiphospholipid antibody positive   • Primary osteoarthritis of right knee     Past Medical History:   Diagnosis Date   • Depression    • Esophageal ring    • GERD (gastroesophageal reflux disease)    • H/O LEEP    • Hearing loss in left ear     • HTN (hypertension)    • Kidney stones    • ANDREIA (obstructive sleep apnea)    • Prediabetes       Past Surgical History:   Procedure Laterality Date   • BREAST BIOPSY Right 2006   • CHOLECYSTECTOMY  2014   • COLONOSCOPY  2011   • ESOPHAGEAL DILATATION  2009   • NEPHRECTOMY PARTIAL Right 2014   • UPPER GASTROINTESTINAL ENDOSCOPY  2011      Family History   Problem Relation Age of Onset   • Atrial fibrillation Mother    • Hypertension Mother    • Heart disease Father    • Diabetes Father    • Breast cancer Paternal Aunt    • Ovarian cancer Neg Hx      Social History     Socioeconomic History   • Marital status:    • Number of children: 4   Tobacco Use   • Smoking status: Never Smoker   • Smokeless tobacco: Never Used   Substance and Sexual Activity   • Alcohol use: No   • Drug use: No   • Sexual activity: Yes     Partners: Male     Comment:  for 41 years      Current Outpatient Medications on File Prior to Visit   Medication Sig Dispense Refill   • buPROPion XL (WELLBUTRIN XL) 300 MG 24 hr tablet Take 1 tablet by mouth Daily. 30 tablet 2   • levothyroxine (Synthroid) 50 MCG tablet Take 1 tablet by mouth Daily. 90 tablet 3   • lisinopril-hydrochlorothiazide (PRINZIDE,ZESTORETIC) 20-12.5 MG per tablet TAKE 1 TABLET BY MOUTH EVERY DAY 90 tablet 1   • VITAMIN D PO Take  by mouth.     • [DISCONTINUED] Aspirin Low Dose 81 MG EC tablet TAKE 1 TABLET BY MOUTH ONCE DAILY 90 tablet 3     No current facility-administered medications on file prior to visit.      No Known Allergies     Review of Systems   Constitutional: Negative.    HENT: Negative.    Eyes: Negative.    Respiratory: Negative.    Cardiovascular: Negative.    Gastrointestinal: Negative.    Endocrine: Negative.    Genitourinary: Negative.    Musculoskeletal: Positive for arthralgias.   Skin: Negative.    Allergic/Immunologic: Negative.    Neurological: Negative.    Hematological: Negative.    Psychiatric/Behavioral: Negative.         Objective   "    Physical Exam  /84   Ht 162.6 cm (64.02\")   Wt 89.4 kg (197 lb)   BMI 33.80 kg/m²     Body mass index is 33.8 kg/m².    General:   Mental Status:  Alert   Appearance: Cooperative, in no acute distress   Build and Nutrition: Overweight by BMI female   Orientation: Alert and oriented to person, place and time   Posture: Normal   Gait: Slight limp on the right    Integument:   Right knee: No skin lesions, no rash, no ecchymosis    Lower Extremities:   Right Knee:    Tenderness:  None    Effusion:  1+    Swelling: None    Crepitus:  Positive    Range of motion:  Extension: 5°       Flexion: 115°  Instability:  No varus laxity, no valgus laxity, negative anterior drawer  Deformities:  Varus      Imaging/Studies  Imaging Results (Last 24 Hours)     ** No results found for the last 24 hours. **            Assessment and Plan     Diagnoses and all orders for this visit:    1. Primary osteoarthritis of right knee (Primary)  -     Case Request; Standing  -     COVID PRE-OP / PRE-PROCEDURE SCREENING ORDER (NO ISOLATION) - Swab, Nasopharynx; Future  -     Instructions on coughing, deep breathing, and incentive spirometry.; Future  -     CBC and Differential; Future  -     Basic metabolic panel; Future  -     Protime-INR; Future  -     APTT; Future  -     Hemoglobin A1c; Future  -     Sedimentation rate; Future  -     C-reactive protein; Future  -     Case Request    Other orders  -     Follow Anesthesia Guidelines / Standing Orders; Future  -     Obtain informed consent  -     Provide instructions to patient regarding NPO status  -     Chlorhexidine Skin Prep - Educate and Review With Patient; Future  -     Provide Patient With ERAS Hydration Instructions  -     Provide Patient With Enhanced Recovery Booklet(s) or Handout  -     Provide Instructions/Handout For Benzoyl Peroxide 5% Wash If Having Shoulder/Arm Surgery (If Prescribed)  -     Provide Instructions/Handout For Bactroban And Chlorhexidine Shower (If " Prescribed)  -     Perform A Memory Screening On All Hip/Knee Replacement Patients >Or Equal To 65 Years Or Older  -     Complete A PROMIS And HOOS Or KOOS Survey If Having Hip Or Knee Replacement  -     Provide Patient With Carbo Loading Instructions  -     Provide Patient With ERAS Booklet(s)/Handout  -     mupirocin (Bactroban Nasal) 2 % nasal ointment; into the nostril(s) as directed by provider 2 (Two) Times a Day.  Dispense: 22 g; Refill: 0  -     chlorhexidine (HIBICLENS) 4 % external liquid; Apply  topically to the appropriate area as directed Daily. Shower with hibiclens solution as directed for 5 days prior to surgery  Dispense: 236 mL; Refill: 0        1. Primary osteoarthritis of right knee        I reviewed my findings with the patient.  She continues to have pain in the right knee, and has had extensive conservative treatment.  She has reached a point where she would like to proceed with knee replacement surgery.  She is a candidate for outpatient surgery, but does need clearance from Dr. Glover before surgery.  She currently sees him for possible coagulopathy.  Risk, benefits, and alternatives to surgery have been discussed.  Please see my counseling note for details.    Surgical Counseling     I have informed the patient of the diagnosis and the prognosis.  Exhaustive conservative treatment modalities have not resulted in long term pain relief.  The symptoms have progressed to the point of daily pain and inability to perform activities of daily living without significant pain. The patient has reached the point of desiring to proceed with total knee arthroplasty after discussing the risks, benefits and alternatives to the procedure.  The surgical procedure itself was discussed in detail. Risks of the procedure were discussed, which included but are not limited to, bleeding, infection, damage to blood vessels and nerves, incomplete pain relief, loosening of the prosthesis (early or late), deep infection  (early or late), need for further surgery, loss of limb, deep venous thrombosis, pulmonary embolus, death, heart attack, stroke, kidney failure, liver failure, and anesthetic complications.  In addition, the potential for deep infection developing in the future was discussed, which could require further surgery.  The knee would have to be re-opened, debrided, and potentially remove the prosthesis, which may or may not be replaced in the future.  Also, the possibility for loosening of the prosthesis has been mentioned.  If the prosthesis loosened, a revision arthroplasty could be performed, with results that are not as predictable compared to the original procedure.  The typical rehabilitative course has also been discussed, and full recovery may take up to a year to see the maximum benefit.  The importance of patient cooperation in the rehabilitative efforts has also been discussed.  No guarantees were given.  The patient understands the potential risks versus the benefits and desires to proceed with total knee arthroplasty at a mutually convenient time.    Return for surgery.      Matt Marie MD  01/24/22  12:42 EST

## 2022-02-25 ENCOUNTER — LAB (OUTPATIENT)
Dept: LAB | Facility: HOSPITAL | Age: 67
End: 2022-02-25

## 2022-02-25 DIAGNOSIS — M17.11 PRIMARY OSTEOARTHRITIS OF RIGHT KNEE: ICD-10-CM

## 2022-02-25 DIAGNOSIS — R76.0 ANTIPHOSPHOLIPID ANTIBODY POSITIVE: ICD-10-CM

## 2022-02-25 LAB
ANION GAP SERPL CALCULATED.3IONS-SCNC: 14.1 MMOL/L (ref 5–15)
APTT PPP: 68.4 SECONDS (ref 22–39)
BASOPHILS # BLD AUTO: 0.05 10*3/MM3 (ref 0–0.2)
BASOPHILS NFR BLD AUTO: 0.8 % (ref 0–1.5)
BUN SERPL-MCNC: 17 MG/DL (ref 8–23)
BUN/CREAT SERPL: 14.3 (ref 7–25)
CALCIUM SPEC-SCNC: 9.4 MG/DL (ref 8.6–10.5)
CHLORIDE SERPL-SCNC: 107 MMOL/L (ref 98–107)
CO2 SERPL-SCNC: 21.9 MMOL/L (ref 22–29)
CREAT SERPL-MCNC: 1.19 MG/DL (ref 0.57–1)
CRP SERPL-MCNC: 0.41 MG/DL (ref 0–0.5)
DEPRECATED RDW RBC AUTO: 41.6 FL (ref 37–54)
EOSINOPHIL # BLD AUTO: 0.08 10*3/MM3 (ref 0–0.4)
EOSINOPHIL NFR BLD AUTO: 1.3 % (ref 0.3–6.2)
ERYTHROCYTE [DISTWIDTH] IN BLOOD BY AUTOMATED COUNT: 12.8 % (ref 12.3–15.4)
ERYTHROCYTE [SEDIMENTATION RATE] IN BLOOD: 5 MM/HR (ref 0–30)
GFR SERPL CREATININE-BSD FRML MDRD: 45 ML/MIN/1.73
GLUCOSE SERPL-MCNC: 113 MG/DL (ref 65–99)
HBA1C MFR BLD: 6.3 % (ref 4.8–5.6)
HCT VFR BLD AUTO: 38.6 % (ref 34–46.6)
HGB BLD-MCNC: 13.1 G/DL (ref 12–15.9)
IMM GRANULOCYTES # BLD AUTO: 0.04 10*3/MM3 (ref 0–0.05)
IMM GRANULOCYTES NFR BLD AUTO: 0.6 % (ref 0–0.5)
INR PPP: 1.13 (ref 0.85–1.16)
LYMPHOCYTES # BLD AUTO: 1.42 10*3/MM3 (ref 0.7–3.1)
LYMPHOCYTES NFR BLD AUTO: 22.2 % (ref 19.6–45.3)
MCH RBC QN AUTO: 30.5 PG (ref 26.6–33)
MCHC RBC AUTO-ENTMCNC: 33.9 G/DL (ref 31.5–35.7)
MCV RBC AUTO: 89.8 FL (ref 79–97)
MONOCYTES # BLD AUTO: 0.47 10*3/MM3 (ref 0.1–0.9)
MONOCYTES NFR BLD AUTO: 7.3 % (ref 5–12)
NEUTROPHILS NFR BLD AUTO: 4.34 10*3/MM3 (ref 1.7–7)
NEUTROPHILS NFR BLD AUTO: 67.8 % (ref 42.7–76)
NRBC BLD AUTO-RTO: 0 /100 WBC (ref 0–0.2)
PLATELET # BLD AUTO: 185 10*3/MM3 (ref 140–450)
PMV BLD AUTO: 10.8 FL (ref 6–12)
POTASSIUM SERPL-SCNC: 4.1 MMOL/L (ref 3.5–5.2)
PROTHROMBIN TIME: 14.1 SECONDS (ref 11.4–14.4)
RBC # BLD AUTO: 4.3 10*6/MM3 (ref 3.77–5.28)
SODIUM SERPL-SCNC: 143 MMOL/L (ref 136–145)
WBC NRBC COR # BLD: 6.4 10*3/MM3 (ref 3.4–10.8)

## 2022-02-25 PROCEDURE — 85613 RUSSELL VIPER VENOM DILUTED: CPT

## 2022-02-25 PROCEDURE — 86146 BETA-2 GLYCOPROTEIN ANTIBODY: CPT

## 2022-02-25 PROCEDURE — 85670 THROMBIN TIME PLASMA: CPT

## 2022-02-25 PROCEDURE — 85652 RBC SED RATE AUTOMATED: CPT

## 2022-02-25 PROCEDURE — 85610 PROTHROMBIN TIME: CPT

## 2022-02-25 PROCEDURE — 85705 THROMBOPLASTIN INHIBITION: CPT

## 2022-02-25 PROCEDURE — 36415 COLL VENOUS BLD VENIPUNCTURE: CPT

## 2022-02-25 PROCEDURE — 86140 C-REACTIVE PROTEIN: CPT

## 2022-02-25 PROCEDURE — 80048 BASIC METABOLIC PNL TOTAL CA: CPT

## 2022-02-25 PROCEDURE — 85730 THROMBOPLASTIN TIME PARTIAL: CPT

## 2022-02-25 PROCEDURE — 85732 THROMBOPLASTIN TIME PARTIAL: CPT

## 2022-02-25 PROCEDURE — 86147 CARDIOLIPIN ANTIBODY EA IG: CPT

## 2022-02-25 PROCEDURE — 85598 HEXAGNAL PHOSPH PLTLT NEUTRL: CPT

## 2022-02-25 PROCEDURE — 83036 HEMOGLOBIN GLYCOSYLATED A1C: CPT

## 2022-02-25 PROCEDURE — 85025 COMPLETE CBC W/AUTO DIFF WBC: CPT

## 2022-02-27 LAB
B2 GLYCOPROT1 IGA SER-ACNC: 11 GPI IGA UNITS (ref 0–25)
B2 GLYCOPROT1 IGG SER-ACNC: <9 GPI IGG UNITS (ref 0–20)
B2 GLYCOPROT1 IGM SER-ACNC: 110 GPI IGM UNITS (ref 0–32)

## 2022-02-28 ENCOUNTER — TELEPHONE (OUTPATIENT)
Dept: ORTHOPEDIC SURGERY | Facility: CLINIC | Age: 67
End: 2022-02-28

## 2022-02-28 ENCOUNTER — OFFICE VISIT (OUTPATIENT)
Dept: ONCOLOGY | Facility: CLINIC | Age: 67
End: 2022-02-28

## 2022-02-28 VITALS
OXYGEN SATURATION: 98 % | SYSTOLIC BLOOD PRESSURE: 154 MMHG | HEIGHT: 64 IN | RESPIRATION RATE: 18 BRPM | WEIGHT: 199 LBS | BODY MASS INDEX: 33.97 KG/M2 | DIASTOLIC BLOOD PRESSURE: 89 MMHG | HEART RATE: 79 BPM | TEMPERATURE: 97.6 F

## 2022-02-28 DIAGNOSIS — R76.0 ANTIPHOSPHOLIPID ANTIBODY POSITIVE: Primary | Chronic | ICD-10-CM

## 2022-02-28 LAB
CARDIOLIPIN IGG SER IA-ACNC: <9 GPL U/ML (ref 0–14)
CARDIOLIPIN IGM SER IA-ACNC: 108 MPL U/ML (ref 0–12)

## 2022-02-28 PROCEDURE — 99214 OFFICE O/P EST MOD 30 MIN: CPT | Performed by: INTERNAL MEDICINE

## 2022-02-28 RX ORDER — BUPROPION HYDROCHLORIDE 150 MG/1
TABLET ORAL
COMMUNITY
Start: 2022-02-09 | End: 2022-02-28 | Stop reason: DRUGHIGH

## 2022-02-28 NOTE — PROGRESS NOTES
CHIEF COMPLAINT: Follow-up circulating lupus inhibitor    Problem List:  Oncology/Hematology History Overview Note   1.  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  2.  Hypertension  3.  Hypothyroidism  4.  Reflux  5.  Osteoarthritis  6.  History of partial nephrectomy, cholecystectomy, esophageal dilations, colonoscopy, upper GI endoscopy, and breast biopsies all done since 2006 most recent being the nephrectomy 2014 without bleeding or clotting complications     Hematology history timeline:  -5/13/2021 PTT 70.1 with normal CBC and PT.  TSH running high at 5.  Normal B12  -9/9/2021 PTT 69.6.  INR normal.  CMP normal.  CBC normal.    -11/8/2021 [facility] hematology consultation: She was getting enrolled on a Alzheimer's prevention study at .  Screening tests included a PTT which was abnormal in May and again in September and she was taken off the study.  She has not had easy bruising or petechiae.  She has not had venous or arterial thrombotic events.  No family history of easy bleeding or bruising and no family history of propensity of clotting.  Only a family history of Alzheimer's.  We will start with a lupus anticoagulant and a PTT mixing study and I will see her back in a few days to go over that and then that we will decide whether or not there is a circulating inhibitor procoagulant or whether this is a circulating inhibitor that we need to do a Wingate assay on to discern the magnitude of factor deficiency due to antibody inhibition.    -11/12/2021 [facility] medical oncology follow-up visit: Her PTT mixing study shows immediate but only partial correction with baseline PTT 66.8 and with one-to-one dilution was 47.9 immediately and at 60 minutes was 50.5 consistent with a inhibitor pattern.  Lupus anticoagulant is pending.  If that comes back negative then we will have to do Wingate assay to assess for antibody to clotting factors factors.  If this is a lupus anticoagulant,  then she actually is not at risk of bleeding but is a procoagulant.  If this is a circulating inhibitor to clotting factors, she has had no easy bruising or bleeding which would be a little odd for that scenario.  In the absence of clinical complications from a lupus anticoagulant, we would not necessarily prophylactically anticoagulate unless there were evidences of venous or arterial thrombosis.  I will add anticardiolipin antibody and beta-2 glycoprotein 1 to labs today.    -11/22/2021 [facility] oncology follow-up visit: Her beta-2 glycoprotein 1 IgM was positive at 149.  Her lupus anticoagulant was also positive.  Her anticardiolipin antibody was low medium positive at 69.  She has had no dylon thrombotic or embolic arterial or venous events.  Microvascular thrombotic events causing dementia would be an unusual manifestation of this problem.  This does account for her elevated PTT and actually does not put her at risk of bleeding from procedures but puts her at risk of clotting.  I would vouch from this that anything she needs to do from the Alzheimer's prevention study standpoint would be reasonable to proceed as far as I am concerned but I do not know the mechanism of action of the investigational agent so I cannot say for sure.  I will check her CCP and DAMIAN though she has no rheumatological complaints along with a urinalysis, C3, C4, sedimentation rate, and C-reactive protein.  She has had no recent bacterial or viral illnesses that would cause false positivity.  She has not been on amoxicillin or procainamide or Dilantin that would cause false positivity.  I will repeat her beta-2 glycoprotein 1, anticardiolipin antibody, and lupus anticoagulant again in 3 months to make sure that this is persistent.  If it is then she is at increased risk for clotting events but in the absence of clotting events, there is no data to suggest that prophylactically anticoagulating a person is helpful.  Some  anticardiolipin antibody syndrome issues can be mitigated by the aspirin that she is already taking but if she does have a dylon thrombotic or embolic event then she would need lifetime anticoagulation at that junction.  I will also check her CBC and CMP and pay attention to her creatinine which in January 2020 was slightly elevated at 1.16 with GFR of 47 that could go along with a collagen vascular disorder.  Depending on the above labs, I may end up referring her to rheumatology as well.    -11/22/2021 data:  CCP antibodies 23 weakly positive upper limit of normal 19.  Rheumatoid factor and DAMIAN negative.  C3 182 (upper limit of normal 167)  C4 46 (upper limit of normal 38)    -2/25/2022 data:  CBC and differential normal.  Beta-2 glycoprotein 1 IgM 110 upper limit of normal 32.  Normal IgA and IgG.  Lupus anticoagulant and anticardiolipin antibody still pending.  Creatinine 1.19 with GFR 45 glucose 113 otherwise unremarkable BMP.  Sedimentation rate 5 and C-reactive protein 0.41 both normal.    -2/28/2022 [facility] oncology follow-up visit: Though her anticardiolipin antibody and lupus anticoagulant are still pending from 3 days ago, with a persistent beta-2 glycoprotein 1 IgM of 110, she certainly has persistent evidence of circulating inhibitor that would cause her PTT to be chronically elevated and uninterpretable for anticoagulation monitoring purposes such as with heparin if she were to use that in the future.  With only a borderline elevation of her CCP and negative rheumatoid factor and DAMIAN with complement 3 and 4 levels that are above normal and not low, and especially with sedimentation rate and C-reactive protein that are both normal, the odds of vasculitic or connective tissue disorder is low and I would not necessarily send her to rheumatology unless she develops symptoms.  From the hematological standpoint, people can have these inhibitory antibodies that make them prone towards clotting but  that does not necessitate prophylactic use of anticoagulation in the absence of thrombosis but were she to ever have a thrombotic event I would want to hear of that.  She has had a partial nephrectomy but is already taking an aspirin with relative safety thus far.  She needs no further hematologic evaluation at this point.  I will see her on an as-needed basis and she will follow up with primary care.  She is in need of right knee replacement.  While it is okay from my standpoint to proceed with this even if the lupus anticoagulant and anticardiolipin antibody come back positive as I do believe given the persistent beta-2 glycoprotein 1 IgM that this is a true persistent circulating inhibitor, this inhibitor does not put her at risk of bleeding but actually increases her risk of clotting and orthopedic surgeons do not usually use full dose heparin drip with PTT monitoring postoperatively.  I would recommend Dr. Marie use what ever the most aggressive end of his spectrum of prophylaxis would be.  Typically prophylactic Lovenox does not require PTT monitoring for efficacy.  She asked me about whether it was safe to do it as an outpatient and I told her that with this circulating inhibitor and with a high risk of clotting post knee replacement that I would prefer her to be an inpatient where she could get aggressive postoperative physical therapy and be sure that she has prophylactic anticoagulation given until she was ambulating well and then would go back to the aspirin.  She also needs to coordinate with him as to the timing of cessation of aspirin which currently she has been off of since finding the elevated PTT when the Alzheimer's folks told her that the aspirin increased her risk of bleeding because of this PTT.  In fact, the aspirin may have prevented this circulating inhibitor from causing thrombosis and I would want her back on aspirin if her renal function allows in light of her history of partial  "nephrectomy which thus far seems to be the case.  I will see her on an as-needed basis.         Antiphospholipid antibody positive       HISTORY OF PRESENT ILLNESS:  The patient is a 66 y.o. female, here for follow up on management of circulating lupus inhibitor.  No dylon thrombotic disease history    Past Medical History:   Diagnosis Date   • Depression    • Esophageal ring    • GERD (gastroesophageal reflux disease)    • H/O LEEP    • Hearing loss in left ear    • HTN (hypertension)    • Kidney stones    • ANDREIA (obstructive sleep apnea)    • Prediabetes      Past Surgical History:   Procedure Laterality Date   • BREAST BIOPSY Right 2006   • CHOLECYSTECTOMY  2014   • COLONOSCOPY  2011   • ESOPHAGEAL DILATATION  2009   • NEPHRECTOMY PARTIAL Right 2014   • UPPER GASTROINTESTINAL ENDOSCOPY  2011       No Known Allergies    Family History and Social History reviewed and changed as necessary    REVIEW OF SYSTEM:   No new somatic complaints    PHYSICAL EXAM:  No vasculitis on skin exam.  No synovitis.  No malar rash.  No palpable hepatosplenomegaly or cervical axillary or inguinal adenopathy.    Vitals:    02/28/22 0751   BP: 154/89   Pulse: 79   Resp: 18   Temp: 97.6 °F (36.4 °C)   SpO2: 98%   Weight: 90.3 kg (199 lb)   Height: 162.6 cm (64\")     Vitals:    02/28/22 0751   PainSc: 0-No pain          ECOG score: 0           Vitals reviewed.    ECOG: (0) Fully Active - Able to Carry On All Pre-disease Performance Without Restriction    Lab Results   Component Value Date    HGB 13.1 02/25/2022    HCT 38.6 02/25/2022    MCV 89.8 02/25/2022     02/25/2022    WBC 6.40 02/25/2022    NEUTROABS 4.34 02/25/2022    LYMPHSABS 1.42 02/25/2022    MONOSABS 0.47 02/25/2022    EOSABS 0.08 02/25/2022    BASOSABS 0.05 02/25/2022       Lab Results   Component Value Date    GLUCOSE 113 (H) 02/25/2022    BUN 17 02/25/2022    CREATININE 1.19 (H) 02/25/2022     02/25/2022    K 4.1 02/25/2022     02/25/2022    CO2 21.9 (L) " 02/25/2022    CALCIUM 9.4 02/25/2022    PROTEINTOT 7.5 11/22/2021    ALBUMIN 5.00 11/22/2021    BILITOT 0.4 11/22/2021    ALKPHOS 97 11/22/2021    AST 20 11/22/2021    ALT 28 11/22/2021             ASSESSMENT & PLAN:  1.  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  2.  Hypertension  3.  Hypothyroidism  4.  Reflux  5.  Osteoarthritis  6.  History of partial nephrectomy, cholecystectomy, esophageal dilations, colonoscopy, upper GI endoscopy, and breast biopsies all done since 2006 most recent being the nephrectomy 2014 without bleeding or clotting complications     Hematology history timeline:  -5/13/2021 PTT 70.1 with normal CBC and PT.  TSH running high at 5.  Normal B12  -9/9/2021 PTT 69.6.  INR normal.  CMP normal.  CBC normal.    -11/8/2021 [facility] hematology consultation: She was getting enrolled on a Alzheimer's prevention study at .  Screening tests included a PTT which was abnormal in May and again in September and she was taken off the study.  She has not had easy bruising or petechiae.  She has not had venous or arterial thrombotic events.  No family history of easy bleeding or bruising and no family history of propensity of clotting.  Only a family history of Alzheimer's.  We will start with a lupus anticoagulant and a PTT mixing study and I will see her back in a few days to go over that and then that we will decide whether or not there is a circulating inhibitor procoagulant or whether this is a circulating inhibitor that we need to do a [place]upe assay on to discern the magnitude of factor deficiency due to antibody inhibition.    -11/12/2021 [facility] medical oncology follow-up visit: Her PTT mixing study shows immediate but only partial correction with baseline PTT 66.8 and with one-to-one dilution was 47.9 immediately and at 60 minutes was 50.5 consistent with a inhibitor pattern.  Lupus anticoagulant is pending.  If that comes back negative then we will have to do  Brownfield assay to assess for antibody to clotting factors factors.  If this is a lupus anticoagulant, then she actually is not at risk of bleeding but is a procoagulant.  If this is a circulating inhibitor to clotting factors, she has had no easy bruising or bleeding which would be a little odd for that scenario.  In the absence of clinical complications from a lupus anticoagulant, we would not necessarily prophylactically anticoagulate unless there were evidences of venous or arterial thrombosis.  I will add anticardiolipin antibody and beta-2 glycoprotein 1 to labs today.    -11/22/2021 [facility] oncology follow-up visit: Her beta-2 glycoprotein 1 IgM was positive at 149.  Her lupus anticoagulant was also positive.  Her anticardiolipin antibody was low medium positive at 69.  She has had no dylon thrombotic or embolic arterial or venous events.  Microvascular thrombotic events causing dementia would be an unusual manifestation of this problem.  This does account for her elevated PTT and actually does not put her at risk of bleeding from procedures but puts her at risk of clotting.  I would vouch from this that anything she needs to do from the Alzheimer's prevention study standpoint would be reasonable to proceed as far as I am concerned but I do not know the mechanism of action of the investigational agent so I cannot say for sure.  I will check her CCP and DAMIAN though she has no rheumatological complaints along with a urinalysis, C3, C4, sedimentation rate, and C-reactive protein.  She has had no recent bacterial or viral illnesses that would cause false positivity.  She has not been on amoxicillin or procainamide or Dilantin that would cause false positivity.  I will repeat her beta-2 glycoprotein 1, anticardiolipin antibody, and lupus anticoagulant again in 3 months to make sure that this is persistent.  If it is then she is at increased risk for clotting events but in the absence of clotting events,  there is no data to suggest that prophylactically anticoagulating a person is helpful.  Some anticardiolipin antibody syndrome issues can be mitigated by the aspirin that she is already taking but if she does have a dylon thrombotic or embolic event then she would need lifetime anticoagulation at that junction.  I will also check her CBC and CMP and pay attention to her creatinine which in January 2020 was slightly elevated at 1.16 with GFR of 47 that could go along with a collagen vascular disorder.  Depending on the above labs, I may end up referring her to rheumatology as well.    -11/22/2021 data:  CCP antibodies 23 weakly positive upper limit of normal 19.  Rheumatoid factor and DAMIAN negative.  C3 182 (upper limit of normal 167)  C4 46 (upper limit of normal 38)    -2/25/2022 data:  CBC and differential normal.  Beta-2 glycoprotein 1 IgM 110 upper limit of normal 32.  Normal IgA and IgG.  Lupus anticoagulant and anticardiolipin antibody still pending.  Creatinine 1.19 with GFR 45 glucose 113 otherwise unremarkable BMP.  Sedimentation rate 5 and C-reactive protein 0.41 both normal.    -2/28/2022 [facility] oncology follow-up visit: Though her anticardiolipin antibody and lupus anticoagulant are still pending from 3 days ago, with a persistent beta-2 glycoprotein 1 IgM of 110, she certainly has persistent evidence of circulating inhibitor that would cause her PTT to be chronically elevated and uninterpretable for anticoagulation monitoring purposes such as with heparin if she were to use that in the future.  With only a borderline elevation of her CCP and negative rheumatoid factor and DAMIAN with complement 3 and 4 levels that are above normal and not low, and especially with sedimentation rate and C-reactive protein that are both normal, the odds of vasculitic or connective tissue disorder is low and I would not necessarily send her to rheumatology unless she develops symptoms.  From the hematological  standpoint, people can have these inhibitory antibodies that make them prone towards clotting but that does not necessitate prophylactic use of anticoagulation in the absence of thrombosis but were she to ever have a thrombotic event I would want to hear of that.  She has had a partial nephrectomy but is already taking an aspirin with relative safety thus far.  She needs no further hematologic evaluation at this point.  I will see her on an as-needed basis and she will follow up with primary care.  She is in need of right knee replacement.  While it is okay from my standpoint to proceed with this even if the lupus anticoagulant and anticardiolipin antibody come back positive as I do believe given the persistent beta-2 glycoprotein 1 IgM that this is a true persistent circulating inhibitor, this inhibitor does not put her at risk of bleeding but actually increases her risk of clotting and orthopedic surgeons do not usually use full dose heparin drip with PTT monitoring postoperatively.  I would recommend Dr. Marie use what ever the most aggressive end of his spectrum of prophylaxis would be.  Typically prophylactic Lovenox does not require PTT monitoring for efficacy.  She asked me about whether it was safe to do it as an outpatient and I told her that with this circulating inhibitor and with a high risk of clotting post knee replacement that I would prefer her to be an inpatient where she could get aggressive postoperative physical therapy and be sure that she has prophylactic anticoagulation given until she was ambulating well and then would go back to the aspirin.  She also needs to coordinate with him as to the timing of cessation of aspirin which currently she has been off of since finding the elevated PTT when the Alzheimer's folks told her that the aspirin increased her risk of bleeding because of this PTT.  In fact, the aspirin may have prevented this circulating inhibitor from causing thrombosis and I would  want her back on aspirin if her renal function allows in light of her history of partial nephrectomy which thus far seems to be the case.  I will see her on an as-needed basis.    Total time of care today inclusive of time spent today prior to her arrival reviewing and cataloging data from November and from last week and during visit translating that to her and after visit putting forth the plan as outlined above took 30 minutes of patient care time throughout the day today.  Elmer Glover MD    02/28/2022

## 2022-02-28 NOTE — TELEPHONE ENCOUNTER
Patient had some questions in regards to her scheduled surgery that she has scheduled for 05/03/2022 and she would like to get a call back in regards to this matter at [phone].    Please advise.

## 2022-03-01 ENCOUNTER — TELEPHONE (OUTPATIENT)
Dept: ONCOLOGY | Facility: CLINIC | Age: 67
End: 2022-03-01

## 2022-03-01 NOTE — TELEPHONE ENCOUNTER
Caller: Darrion Mendoza    Relationship: Self    Best call back number: [phone]    What is the best time to reach you: ANYTIME    Who are you requesting to speak with (clinical staff, provider,  specific staff member): DR WOODS OR NURSE    What was the call regarding: PT WOULD LIKE TO KNOW IF SHE PUTS OFF THE KNEE SURGERY WILL SHE BE MORE LIKELY TO HAVE A BLOOD CLOT PROBLEM AS SHE GETS OLDER (IF SHE HAS THE KNEE SURGERY 10 YEARS FROM NOW VERSUS 2 MONTHS FROM NOW)?    Do you require a callback: YES

## 2022-03-02 LAB
APTT HEX PL PPP: 22 SEC (ref 0–11)
APTT SCREEN TO CONFIRM RATIO: 2.32 RATIO (ref 0–1.34)
CONFIRM APTT/NORMAL: 95.5 SEC (ref 0–47.6)
DRVVT SCREEN TO CONFIRM RATIO: 2.1 RATIO (ref 0.8–1.2)
LA 2 SCREEN W REFLEX-IMP: ABNORMAL
MIXING APTT: 75.4 SEC (ref 0–48.9)
MIXING DRVVT: 56.6 SEC (ref 0–40.4)
SCREEN APTT: 84.9 SEC (ref 0–51.9)
SCREEN DRVVT: 80.6 SEC (ref 0–47)
THROMBIN TIME: 18.6 SEC (ref 0–23)

## 2022-03-02 NOTE — TELEPHONE ENCOUNTER
"Called patient and relayed Dr. Glover recommendation \"Unless she can forego knee surgery altogether, waiting until she is older does not necessarily put her at greater risk of clot unless she is so old that her age impairs her ability to get up and around postoperatively as that is the main determinant of clotting risk\". Patient verbalized understanding.   "

## 2022-03-25 ENCOUNTER — TELEPHONE (OUTPATIENT)
Dept: FAMILY MEDICINE CLINIC | Facility: CLINIC | Age: 67
End: 2022-03-25

## 2022-03-25 NOTE — TELEPHONE ENCOUNTER
Caller: Darrion Mendoza    Relationship: Self    Best call back number: [phone]    Who are you requesting to speak with (clinical staff, provider,  specific staff member): CLINICAL STAFF    What was the call regarding: PATIENT STATED SHE IS HAVING A KNEE REPLACEMENT WITH DR GUTIERREZ ON 5/3/22. DR. WOODS, HER HEMATOLOGIST, INFORMED HER SHE HAS BLOOD CLOTTING FACTOR THAT INDICATES SLOW CLOTTING AND IS LIKELY TO CAUSE CLOTTING PROBLEMS. HER BLOOD PRESSURE IS CONSISTENTLY 150s OVER 80s. PATIENT IS TAKING lisinopril-hydrochlorothiazide (PRINZIDE,ZESTORETIC) 20-12.5 MG per tablet AND WOULD LIKE TO KNOW IF THIS NEEDS TO BE ADJUSTED PRIOR TO HER SURGERY.    Do you require a callback: YES

## 2022-04-07 ENCOUNTER — ANESTHESIA EVENT (OUTPATIENT)
Dept: PERIOP | Facility: HOSPITAL | Age: 67
End: 2022-04-07

## 2022-04-19 ENCOUNTER — OFFICE VISIT (OUTPATIENT)
Dept: FAMILY MEDICINE CLINIC | Facility: CLINIC | Age: 67
End: 2022-04-19

## 2022-04-19 VITALS
TEMPERATURE: 98.2 F | DIASTOLIC BLOOD PRESSURE: 90 MMHG | WEIGHT: 195.8 LBS | RESPIRATION RATE: 18 BRPM | BODY MASS INDEX: 33.43 KG/M2 | SYSTOLIC BLOOD PRESSURE: 146 MMHG | HEIGHT: 64 IN | OXYGEN SATURATION: 98 % | HEART RATE: 78 BPM

## 2022-04-19 DIAGNOSIS — R73.03 PREDIABETES: ICD-10-CM

## 2022-04-19 DIAGNOSIS — E03.9 ACQUIRED HYPOTHYROIDISM: ICD-10-CM

## 2022-04-19 DIAGNOSIS — I10 PRIMARY HYPERTENSION: Primary | ICD-10-CM

## 2022-04-19 PROCEDURE — 99213 OFFICE O/P EST LOW 20 MIN: CPT | Performed by: FAMILY MEDICINE

## 2022-04-20 NOTE — PROGRESS NOTES
Subjective   Darrion Mendoza is a 66 y.o. female.     Hypertension  This is a chronic problem. The current episode started more than 1 year ago. The problem has been gradually worsening since onset. The problem is uncontrolled. Pertinent negatives include no anxiety, blurred vision, chest pain, headaches, neck pain, orthopnea, peripheral edema or shortness of breath. Risk factors for coronary artery disease include post-menopausal state. Past treatments include ACE inhibitors and diuretics. Current antihypertension treatment includes ACE inhibitors and diuretics. The current treatment provides mild improvement. Hypertensive end-organ damage includes kidney disease. There is no history of angina. There is no history of sleep apnea or a thyroid problem.      We discussed the results of her labs and records from her hematology visit it looks like she has a beta-2 glycoprotein elevated IgM and her PEEP kj was elevated.  We discussed her upcoming knee replacement surgery.    She was started on a low-dose Synthroid because her TSH was slightly elevated on 2 occasions.  At this point she is not thinking that the Synthroid has made her feel any better and she is considering after the surgery at some point to try to stop the Synthroid and we will recheck her TSH and watch that closely.    The following portions of the patient's history were reviewed and updated as appropriate: allergies, current medications, past family history, past medical history, past social history, past surgical history and problem list.    Review of Systems   Eyes: Negative for blurred vision.   Respiratory: Negative for shortness of breath.    Cardiovascular: Negative for chest pain and orthopnea.   Musculoskeletal: Negative for neck pain.   Neurological: Negative for headaches.       Objective     Vitals:    04/19/22 1509 04/19/22 1522   BP: 152/88 146/90   BP Location:  Left arm   Patient Position:  Sitting   Cuff Size:  Adult   Pulse: 78   "  Resp: 18    Temp: 98.2 °F (36.8 °C)    SpO2: 98%    Weight: 88.8 kg (195 lb 12.8 oz)    Height: 162.6 cm (64.02\")        Physical Exam  Vitals and nursing note reviewed.   Constitutional:       Appearance: She is well-developed.   HENT:      Head: Normocephalic and atraumatic.   Eyes:      General:         Right eye: No discharge.         Left eye: No discharge.      Pupils: Pupils are equal, round, and reactive to light.   Cardiovascular:      Rate and Rhythm: Normal rate and regular rhythm.      Heart sounds: Normal heart sounds.   Pulmonary:      Effort: Pulmonary effort is normal.      Breath sounds: Normal breath sounds.   Abdominal:      General: Bowel sounds are normal.      Palpations: Abdomen is soft. There is no mass.      Tenderness: There is no abdominal tenderness.   Musculoskeletal:         General: Normal range of motion.      Right shoulder: No swelling.      Cervical back: Normal range of motion and neck supple.   Skin:     General: Skin is warm and dry.      Nails: There is no clubbing.   Neurological:      Mental Status: She is alert and oriented to person, place, and time.      Deep Tendon Reflexes: Reflexes are normal and symmetric.   Psychiatric:         Behavior: Behavior normal.         Thought Content: Thought content normal.         Judgment: Judgment normal.         Assessment/Plan     Problem List Items Addressed This Visit        Cardiac and Vasculature    HTN (hypertension) - Primary    Relevant Medications    metoprolol tartrate (LOPRESSOR) 25 MG tablet       Endocrine and Metabolic    Prediabetes    Acquired hypothyroidism    Relevant Medications    metoprolol tartrate (LOPRESSOR) 25 MG tablet      We will add metoprolol to her blood pressure regimen and recheck blood pressure in 2 to 3 months she has an upcoming surgery otherwise am not making any changes.  We discussed side effects of the beta-blocker.  She does continue to have chronic fatigue and is seeing pulmonary for sleep " apnea narcolepsy was mentioned but she is not interested in taking a medication for that at this time.  She will continue to use her CPAP.

## 2022-04-21 ENCOUNTER — APPOINTMENT (OUTPATIENT)
Dept: PREADMISSION TESTING | Facility: HOSPITAL | Age: 67
End: 2022-04-21

## 2022-04-22 ENCOUNTER — PRE-ADMISSION TESTING (OUTPATIENT)
Dept: PREADMISSION TESTING | Facility: HOSPITAL | Age: 67
End: 2022-04-22

## 2022-04-22 VITALS — WEIGHT: 196.21 LBS | HEIGHT: 64 IN | BODY MASS INDEX: 33.5 KG/M2

## 2022-04-22 LAB
APTT PPP: 65 SECONDS (ref 22–39)
DEPRECATED RDW RBC AUTO: 39.1 FL (ref 37–54)
ERYTHROCYTE [DISTWIDTH] IN BLOOD BY AUTOMATED COUNT: 12.1 % (ref 12.3–15.4)
HBA1C MFR BLD: 6.4 % (ref 4.8–5.6)
HCT VFR BLD AUTO: 36.1 % (ref 34–46.6)
HGB BLD-MCNC: 12.5 G/DL (ref 12–15.9)
INR PPP: 1.03 (ref 0.84–1.13)
MCH RBC QN AUTO: 30.6 PG (ref 26.6–33)
MCHC RBC AUTO-ENTMCNC: 34.6 G/DL (ref 31.5–35.7)
MCV RBC AUTO: 88.3 FL (ref 79–97)
PLATELET # BLD AUTO: 191 10*3/MM3 (ref 140–450)
PMV BLD AUTO: 10.5 FL (ref 6–12)
PROTHROMBIN TIME: 13.4 SECONDS (ref 11.4–14.4)
QT INTERVAL: 400 MS
QTC INTERVAL: 450 MS
RBC # BLD AUTO: 4.09 10*6/MM3 (ref 3.77–5.28)
WBC NRBC COR # BLD: 7.34 10*3/MM3 (ref 3.4–10.8)

## 2022-04-22 PROCEDURE — 85027 COMPLETE CBC AUTOMATED: CPT

## 2022-04-22 PROCEDURE — 36415 COLL VENOUS BLD VENIPUNCTURE: CPT

## 2022-04-22 PROCEDURE — 93010 ELECTROCARDIOGRAM REPORT: CPT | Performed by: STUDENT IN AN ORGANIZED HEALTH CARE EDUCATION/TRAINING PROGRAM

## 2022-04-22 PROCEDURE — 85610 PROTHROMBIN TIME: CPT

## 2022-04-22 PROCEDURE — 93005 ELECTROCARDIOGRAM TRACING: CPT

## 2022-04-22 PROCEDURE — 85730 THROMBOPLASTIN TIME PARTIAL: CPT

## 2022-04-22 PROCEDURE — 83036 HEMOGLOBIN GLYCOSYLATED A1C: CPT

## 2022-04-22 RX ORDER — ASPIRIN 81 MG/1
81 TABLET ORAL DAILY
Status: ON HOLD | COMMUNITY
End: 2022-05-05 | Stop reason: SDUPTHER

## 2022-04-22 ASSESSMENT — KOOS JR
KOOS JR SCORE: 14
KOOS JR SCORE: 52.465

## 2022-04-22 NOTE — PAT
Prescription for Bactroban and Chlorhexidine shower called into patient's pharmacy or [facility] pharmacy by patient's surgeon.  Reinforced with patient to  the prescription from applicable pharmacy if they haven't already.  Verbal and written instructions given regarding proper use of the Bactroban and Chlorhexidine to patient and/or famlily during PAT visit. Patient/family also instructed to complete checklist and return it to Pre-op on the day of surgery.  Patient and/or family verbalized understanding.    Patient to apply Chlorhexadine wipes  to surgical area (as instructed) the night before procedure and the AM of procedure. Wipes provided.    Patient instructed to drink 20 ounces (or until full) of Gatorade and it needs to be completed 1 hour (for Main OR patients) or 2 hours (scheduled  section patients) before given arrival time for procedure (NO RED Gatorade)    Patient verbalized understanding.    Per Anesthesia Request, patient instructed not to take their ACE/ARB medications on the AM of surgery.    Discussed with patient options for receiving total joint replacement education and assessed patient's ability and preference. Joint Replacement Guide given to patient during PAT visit since not received a copy within the last year. Encouraged patient/family to read guide thoroughly and notify PAT staff with any questions or concerns. Handout provided directing patient to links to watch online videos related to joint replacement surgery on the [facility] website. The handout gives detailed instructions for joining an online joint replacement class through Zoom or phone conference offered on . Patient agreed to participate by watching videos online. Patient verbalized understanding of instructions and to complete the online learning tool survey. Encouraged to share information with family and/or . An overview of the joint replacement education was provided during the visit including  general perioperative instructions that are routine for all surgical patients (PAT PASS, wipes, directions to pre-op, etc.).    Clean catch urinalysis not indicated because patient denied recent urinary frequency, urinary urgency, burning or pain upon urination, or flank pain. No recent UTIs.    Anisha in dr pettit's office notified of critical PTT   .

## 2022-05-01 ENCOUNTER — APPOINTMENT (OUTPATIENT)
Dept: PREADMISSION TESTING | Facility: HOSPITAL | Age: 67
End: 2022-05-01

## 2022-05-01 ENCOUNTER — CLINICAL SUPPORT NO REQUIREMENTS (OUTPATIENT)
Dept: PREADMISSION TESTING | Facility: HOSPITAL | Age: 67
End: 2022-05-01

## 2022-05-01 DIAGNOSIS — M17.11 PRIMARY OSTEOARTHRITIS OF RIGHT KNEE: ICD-10-CM

## 2022-05-01 LAB — SARS-COV-2 RNA PNL SPEC NAA+PROBE: NOT DETECTED

## 2022-05-01 PROCEDURE — U0005 INFEC AGEN DETEC AMPLI PROBE: HCPCS

## 2022-05-01 PROCEDURE — U0004 COV-19 TEST NON-CDC HGH THRU: HCPCS

## 2022-05-01 PROCEDURE — C9803 HOPD COVID-19 SPEC COLLECT: HCPCS

## 2022-05-03 ENCOUNTER — ANESTHESIA (OUTPATIENT)
Dept: PERIOP | Facility: HOSPITAL | Age: 67
End: 2022-05-03

## 2022-05-05 ENCOUNTER — ANESTHESIA EVENT CONVERTED (OUTPATIENT)
Dept: ANESTHESIOLOGY | Facility: HOSPITAL | Age: 67
End: 2022-05-05

## 2022-05-05 ENCOUNTER — HOSPITAL ENCOUNTER (OUTPATIENT)
Facility: HOSPITAL | Age: 67
Discharge: HOME | End: 2022-05-06
Attending: ORTHOPAEDIC SURGERY | Admitting: ORTHOPAEDIC SURGERY

## 2022-05-05 ENCOUNTER — APPOINTMENT (OUTPATIENT)
Dept: GENERAL RADIOLOGY | Facility: HOSPITAL | Age: 67
End: 2022-05-05

## 2022-05-05 DIAGNOSIS — G47.33 OSA ON CPAP: ICD-10-CM

## 2022-05-05 DIAGNOSIS — Z99.89 OSA ON CPAP: ICD-10-CM

## 2022-05-05 DIAGNOSIS — Z78.0 POSTMENOPAUSAL: ICD-10-CM

## 2022-05-05 DIAGNOSIS — M17.0 BILATERAL PRIMARY OSTEOARTHRITIS OF KNEE: ICD-10-CM

## 2022-05-05 DIAGNOSIS — M25.562 CHRONIC PAIN OF BOTH KNEES: ICD-10-CM

## 2022-05-05 DIAGNOSIS — Z96.651 S/P TOTAL KNEE ARTHROPLASTY, RIGHT: Primary | ICD-10-CM

## 2022-05-05 DIAGNOSIS — M25.561 CHRONIC PAIN OF BOTH KNEES: ICD-10-CM

## 2022-05-05 DIAGNOSIS — Z78.0 POST-MENOPAUSAL: ICD-10-CM

## 2022-05-05 DIAGNOSIS — M17.11 PRIMARY OSTEOARTHRITIS OF RIGHT KNEE: ICD-10-CM

## 2022-05-05 DIAGNOSIS — G56.01 CARPAL TUNNEL SYNDROME OF RIGHT WRIST: ICD-10-CM

## 2022-05-05 DIAGNOSIS — Z23 NEED FOR DIPHTHERIA-TETANUS-PERTUSSIS (TDAP) VACCINE: ICD-10-CM

## 2022-05-05 DIAGNOSIS — R79.1 ELEVATED PARTIAL THROMBOPLASTIN TIME (PTT): ICD-10-CM

## 2022-05-05 DIAGNOSIS — R73.03 PREDIABETES: ICD-10-CM

## 2022-05-05 DIAGNOSIS — G89.29 CHRONIC PAIN OF BOTH KNEES: ICD-10-CM

## 2022-05-05 DIAGNOSIS — F32.89 OTHER DEPRESSION: ICD-10-CM

## 2022-05-05 DIAGNOSIS — K21.9 GASTROESOPHAGEAL REFLUX DISEASE WITHOUT ESOPHAGITIS: ICD-10-CM

## 2022-05-05 DIAGNOSIS — I10 PRIMARY HYPERTENSION: ICD-10-CM

## 2022-05-05 DIAGNOSIS — E03.9 ACQUIRED HYPOTHYROIDISM: ICD-10-CM

## 2022-05-05 DIAGNOSIS — R76.0 ANTIPHOSPHOLIPID ANTIBODY POSITIVE: Chronic | ICD-10-CM

## 2022-05-05 DIAGNOSIS — Z00.00 ANNUAL PHYSICAL EXAM: ICD-10-CM

## 2022-05-05 PROBLEM — E66.9 OBESITY: Status: ACTIVE | Noted: 2022-05-05

## 2022-05-05 LAB
GLUCOSE BLDC GLUCOMTR-MCNC: 111 MG/DL (ref 70–130)
GLUCOSE BLDC GLUCOMTR-MCNC: 207 MG/DL (ref 70–130)
GLUCOSE BLDC GLUCOMTR-MCNC: 262 MG/DL (ref 70–130)
POTASSIUM SERPL-SCNC: 3.8 MMOL/L (ref 3.5–5.2)
SARS-COV-2 RDRP RESP QL NAA+PROBE: NORMAL

## 2022-05-05 PROCEDURE — 25010000002 PROPOFOL 10 MG/ML EMULSION: Performed by: ANESTHESIOLOGY

## 2022-05-05 PROCEDURE — 97161 PT EVAL LOW COMPLEX 20 MIN: CPT

## 2022-05-05 PROCEDURE — 27447 TOTAL KNEE ARTHROPLASTY: CPT | Performed by: PHYSICIAN ASSISTANT

## 2022-05-05 PROCEDURE — 25010000002 ROPIVACAINE PER 1 MG: Performed by: ORTHOPAEDIC SURGERY

## 2022-05-05 PROCEDURE — 27447 TOTAL KNEE ARTHROPLASTY: CPT | Performed by: ORTHOPAEDIC SURGERY

## 2022-05-05 PROCEDURE — 82962 GLUCOSE BLOOD TEST: CPT

## 2022-05-05 PROCEDURE — 25010000002 CEFAZOLIN IN DEXTROSE 2-4 GM/100ML-% SOLUTION: Performed by: ORTHOPAEDIC SURGERY

## 2022-05-05 PROCEDURE — 84132 ASSAY OF SERUM POTASSIUM: CPT | Performed by: ANESTHESIOLOGY

## 2022-05-05 PROCEDURE — 25010000002 ROPIVACAINE PER 1 MG: Performed by: NURSE ANESTHETIST, CERTIFIED REGISTERED

## 2022-05-05 PROCEDURE — 87635 SARS-COV-2 COVID-19 AMP PRB: CPT | Performed by: ORTHOPAEDIC SURGERY

## 2022-05-05 PROCEDURE — C1755 CATHETER, INTRASPINAL: HCPCS | Performed by: ORTHOPAEDIC SURGERY

## 2022-05-05 PROCEDURE — 94660 CPAP INITIATION&MGMT: CPT

## 2022-05-05 PROCEDURE — 97116 GAIT TRAINING THERAPY: CPT

## 2022-05-05 PROCEDURE — 73560 X-RAY EXAM OF KNEE 1 OR 2: CPT

## 2022-05-05 PROCEDURE — C1776 JOINT DEVICE (IMPLANTABLE): HCPCS | Performed by: ORTHOPAEDIC SURGERY

## 2022-05-05 PROCEDURE — 97530 THERAPEUTIC ACTIVITIES: CPT

## 2022-05-05 PROCEDURE — 25010000002 DEXAMETHASONE PER 1 MG: Performed by: ANESTHESIOLOGY

## 2022-05-05 PROCEDURE — S0260 H&P FOR SURGERY: HCPCS | Performed by: ORTHOPAEDIC SURGERY

## 2022-05-05 PROCEDURE — C1713 ANCHOR/SCREW BN/BN,TIS/BN: HCPCS | Performed by: ORTHOPAEDIC SURGERY

## 2022-05-05 PROCEDURE — 94799 UNLISTED PULMONARY SVC/PX: CPT

## 2022-05-05 PROCEDURE — C9803 HOPD COVID-19 SPEC COLLECT: HCPCS

## 2022-05-05 PROCEDURE — 25010000002 ONDANSETRON PER 1 MG: Performed by: ANESTHESIOLOGY

## 2022-05-05 DEVICE — GENESIS II RESURFACING PATELLAR                                    PROSTHESIS  32MM
Type: IMPLANTABLE DEVICE | Site: KNEE | Status: FUNCTIONAL
Brand: GENESIS II

## 2022-05-05 DEVICE — DEV CONTRL TISS STRATAFIX SYMM PDS PLUS VIL CT-1 45CM: Type: IMPLANTABLE DEVICE | Site: KNEE | Status: FUNCTIONAL

## 2022-05-05 DEVICE — GENESIS II NON-POROUS TIBIAL                                    BASEPLATE SIZE 4 RIGHT
Type: IMPLANTABLE DEVICE | Site: KNEE | Status: FUNCTIONAL
Brand: GENESIS II

## 2022-05-05 DEVICE — DEV CONTRL TISS STRATAFIX SPIRAL MNCRYL UD 3/0 PLS 60CM: Type: IMPLANTABLE DEVICE | Site: KNEE | Status: FUNCTIONAL

## 2022-05-05 DEVICE — LEGION CR HIGH FLEX XLPE SZ 3-4 10MM
Type: IMPLANTABLE DEVICE | Site: KNEE | Status: FUNCTIONAL
Brand: LEGION

## 2022-05-05 DEVICE — IMPLANTABLE DEVICE: Type: IMPLANTABLE DEVICE | Site: KNEE | Status: FUNCTIONAL

## 2022-05-05 DEVICE — LEGION NARROW CRUCIATE RETAINING                                    OXINIUM SIZE 6N RIGHT
Type: IMPLANTABLE DEVICE | Site: KNEE | Status: FUNCTIONAL
Brand: LEGION

## 2022-05-05 DEVICE — CMT BONE PALACOS R HI/VISC 1X40: Type: IMPLANTABLE DEVICE | Site: KNEE | Status: FUNCTIONAL

## 2022-05-05 RX ORDER — LABETALOL HYDROCHLORIDE 5 MG/ML
10 INJECTION, SOLUTION INTRAVENOUS EVERY 4 HOURS PRN
Status: DISCONTINUED | OUTPATIENT
Start: 2022-05-05 | End: 2022-05-06 | Stop reason: HOSPADM

## 2022-05-05 RX ORDER — PREGABALIN 150 MG/1
150 CAPSULE ORAL ONCE
Status: COMPLETED | OUTPATIENT
Start: 2022-05-05 | End: 2022-05-05

## 2022-05-05 RX ORDER — SODIUM CHLORIDE 0.9 % (FLUSH) 0.9 %
10 SYRINGE (ML) INJECTION EVERY 12 HOURS SCHEDULED
Status: DISCONTINUED | OUTPATIENT
Start: 2022-05-05 | End: 2022-05-06 | Stop reason: HOSPADM

## 2022-05-05 RX ORDER — SODIUM CHLORIDE, SODIUM LACTATE, POTASSIUM CHLORIDE, CALCIUM CHLORIDE 600; 310; 30; 20 MG/100ML; MG/100ML; MG/100ML; MG/100ML
9 INJECTION, SOLUTION INTRAVENOUS CONTINUOUS PRN
Status: DISCONTINUED | OUTPATIENT
Start: 2022-05-05 | End: 2022-05-05 | Stop reason: HOSPADM

## 2022-05-05 RX ORDER — LIDOCAINE HYDROCHLORIDE 10 MG/ML
0.5 INJECTION, SOLUTION EPIDURAL; INFILTRATION; INTRACAUDAL; PERINEURAL ONCE AS NEEDED
Status: COMPLETED | OUTPATIENT
Start: 2022-05-05 | End: 2022-05-05

## 2022-05-05 RX ORDER — INSULIN LISPRO 100 [IU]/ML
0-7 INJECTION, SOLUTION INTRAVENOUS; SUBCUTANEOUS
Status: DISCONTINUED | OUTPATIENT
Start: 2022-05-05 | End: 2022-05-05 | Stop reason: HOSPADM

## 2022-05-05 RX ORDER — PROPOFOL 10 MG/ML
VIAL (ML) INTRAVENOUS AS NEEDED
Status: DISCONTINUED | OUTPATIENT
Start: 2022-05-05 | End: 2022-05-05 | Stop reason: SURG

## 2022-05-05 RX ORDER — ONDANSETRON 2 MG/ML
INJECTION INTRAMUSCULAR; INTRAVENOUS AS NEEDED
Status: DISCONTINUED | OUTPATIENT
Start: 2022-05-05 | End: 2022-05-05 | Stop reason: SURG

## 2022-05-05 RX ORDER — ONDANSETRON 2 MG/ML
4 INJECTION INTRAMUSCULAR; INTRAVENOUS EVERY 6 HOURS PRN
Status: DISCONTINUED | OUTPATIENT
Start: 2022-05-05 | End: 2022-05-06 | Stop reason: HOSPADM

## 2022-05-05 RX ORDER — BUPIVACAINE HYDROCHLORIDE 2.5 MG/ML
INJECTION, SOLUTION EPIDURAL; INFILTRATION; INTRACAUDAL
Status: COMPLETED | OUTPATIENT
Start: 2022-05-05 | End: 2022-05-05

## 2022-05-05 RX ORDER — ROPIVACAINE HYDROCHLORIDE 5 MG/ML
INJECTION, SOLUTION EPIDURAL; INFILTRATION; PERINEURAL AS NEEDED
Status: DISCONTINUED | OUTPATIENT
Start: 2022-05-05 | End: 2022-05-05 | Stop reason: HOSPADM

## 2022-05-05 RX ORDER — MAGNESIUM HYDROXIDE 1200 MG/15ML
LIQUID ORAL AS NEEDED
Status: DISCONTINUED | OUTPATIENT
Start: 2022-05-05 | End: 2022-05-05 | Stop reason: HOSPADM

## 2022-05-05 RX ORDER — ONDANSETRON 4 MG/1
4 TABLET, FILM COATED ORAL EVERY 6 HOURS PRN
Status: DISCONTINUED | OUTPATIENT
Start: 2022-05-05 | End: 2022-05-06 | Stop reason: HOSPADM

## 2022-05-05 RX ORDER — NALOXONE HCL 0.4 MG/ML
0.1 VIAL (ML) INJECTION
Status: DISCONTINUED | OUTPATIENT
Start: 2022-05-05 | End: 2022-05-06 | Stop reason: HOSPADM

## 2022-05-05 RX ORDER — MELOXICAM 15 MG/1
15 TABLET ORAL ONCE
Status: COMPLETED | OUTPATIENT
Start: 2022-05-05 | End: 2022-05-05

## 2022-05-05 RX ORDER — ASPIRIN 81 MG/1
81 TABLET ORAL DAILY
Start: 2022-05-06 | End: 2022-05-06 | Stop reason: SDUPTHER

## 2022-05-05 RX ORDER — SODIUM CHLORIDE 0.9 % (FLUSH) 0.9 %
1-10 SYRINGE (ML) INJECTION AS NEEDED
Status: DISCONTINUED | OUTPATIENT
Start: 2022-05-05 | End: 2022-05-06 | Stop reason: HOSPADM

## 2022-05-05 RX ORDER — SODIUM CHLORIDE 0.9 % (FLUSH) 0.9 %
10 SYRINGE (ML) INJECTION EVERY 12 HOURS SCHEDULED
Status: CANCELLED | OUTPATIENT
Start: 2022-05-05

## 2022-05-05 RX ORDER — ACETAMINOPHEN 500 MG
1000 TABLET ORAL EVERY 8 HOURS
Qty: 42 TABLET | Refills: 0
Start: 2022-05-05 | End: 2022-05-12

## 2022-05-05 RX ORDER — FENTANYL CITRATE 50 UG/ML
50 INJECTION, SOLUTION INTRAMUSCULAR; INTRAVENOUS
Status: DISCONTINUED | OUTPATIENT
Start: 2022-05-05 | End: 2022-05-05 | Stop reason: HOSPADM

## 2022-05-05 RX ORDER — CEFAZOLIN SODIUM 2 G/100ML
2 INJECTION, SOLUTION INTRAVENOUS ONCE
Status: COMPLETED | OUTPATIENT
Start: 2022-05-05 | End: 2022-05-05

## 2022-05-05 RX ORDER — FAMOTIDINE 20 MG/1
20 TABLET, FILM COATED ORAL
Status: COMPLETED | OUTPATIENT
Start: 2022-05-05 | End: 2022-05-05

## 2022-05-05 RX ORDER — SODIUM CHLORIDE 9 MG/ML
120 INJECTION, SOLUTION INTRAVENOUS CONTINUOUS
Status: DISCONTINUED | OUTPATIENT
Start: 2022-05-05 | End: 2022-05-06 | Stop reason: HOSPADM

## 2022-05-05 RX ORDER — OXYCODONE HYDROCHLORIDE 5 MG/1
5 TABLET ORAL EVERY 4 HOURS PRN
Status: DISCONTINUED | OUTPATIENT
Start: 2022-05-05 | End: 2022-05-06 | Stop reason: HOSPADM

## 2022-05-05 RX ORDER — CEFAZOLIN SODIUM 2 G/100ML
2 INJECTION, SOLUTION INTRAVENOUS EVERY 8 HOURS
Status: COMPLETED | OUTPATIENT
Start: 2022-05-05 | End: 2022-05-06

## 2022-05-05 RX ORDER — LIDOCAINE HYDROCHLORIDE 10 MG/ML
INJECTION, SOLUTION EPIDURAL; INFILTRATION; INTRACAUDAL; PERINEURAL AS NEEDED
Status: DISCONTINUED | OUTPATIENT
Start: 2022-05-05 | End: 2022-05-05 | Stop reason: SURG

## 2022-05-05 RX ORDER — MIDAZOLAM HYDROCHLORIDE 1 MG/ML
0.5 INJECTION INTRAMUSCULAR; INTRAVENOUS
Status: DISCONTINUED | OUTPATIENT
Start: 2022-05-05 | End: 2022-05-05 | Stop reason: HOSPADM

## 2022-05-05 RX ORDER — SODIUM CHLORIDE 0.9 % (FLUSH) 0.9 %
10 SYRINGE (ML) INJECTION AS NEEDED
Status: CANCELLED | OUTPATIENT
Start: 2022-05-05

## 2022-05-05 RX ORDER — HYDROMORPHONE HYDROCHLORIDE 1 MG/ML
0.5 INJECTION, SOLUTION INTRAMUSCULAR; INTRAVENOUS; SUBCUTANEOUS
Status: DISCONTINUED | OUTPATIENT
Start: 2022-05-05 | End: 2022-05-05 | Stop reason: HOSPADM

## 2022-05-05 RX ORDER — HYDROMORPHONE HYDROCHLORIDE 1 MG/ML
0.5 INJECTION, SOLUTION INTRAMUSCULAR; INTRAVENOUS; SUBCUTANEOUS
Status: DISCONTINUED | OUTPATIENT
Start: 2022-05-05 | End: 2022-05-06 | Stop reason: HOSPADM

## 2022-05-05 RX ORDER — ACETAMINOPHEN 500 MG
1000 TABLET ORAL ONCE
Status: COMPLETED | OUTPATIENT
Start: 2022-05-05 | End: 2022-05-05

## 2022-05-05 RX ORDER — TRANEXAMIC ACID 10 MG/ML
1000 INJECTION, SOLUTION INTRAVENOUS ONCE
Status: COMPLETED | OUTPATIENT
Start: 2022-05-05 | End: 2022-05-05

## 2022-05-05 RX ORDER — POLYETHYLENE GLYCOL 3350 17 G/17G
17 POWDER, FOR SOLUTION ORAL DAILY
Qty: 15 PACKET | Refills: 0 | Status: SHIPPED | OUTPATIENT
Start: 2022-05-05 | End: 2022-05-06 | Stop reason: SDUPTHER

## 2022-05-05 RX ORDER — MELOXICAM 7.5 MG/1
15 TABLET ORAL DAILY
Status: DISCONTINUED | OUTPATIENT
Start: 2022-05-06 | End: 2022-05-06 | Stop reason: HOSPADM

## 2022-05-05 RX ORDER — ONDANSETRON 2 MG/ML
4 INJECTION INTRAMUSCULAR; INTRAVENOUS ONCE AS NEEDED
Status: DISCONTINUED | OUTPATIENT
Start: 2022-05-05 | End: 2022-05-05 | Stop reason: HOSPADM

## 2022-05-05 RX ORDER — DEXAMETHASONE SODIUM PHOSPHATE 4 MG/ML
INJECTION, SOLUTION INTRA-ARTICULAR; INTRALESIONAL; INTRAMUSCULAR; INTRAVENOUS; SOFT TISSUE AS NEEDED
Status: DISCONTINUED | OUTPATIENT
Start: 2022-05-05 | End: 2022-05-05 | Stop reason: SURG

## 2022-05-05 RX ORDER — NALOXONE HCL 0.4 MG/ML
0.4 VIAL (ML) INJECTION
Status: DISCONTINUED | OUTPATIENT
Start: 2022-05-05 | End: 2022-05-06 | Stop reason: HOSPADM

## 2022-05-05 RX ORDER — OXYCODONE HYDROCHLORIDE 5 MG/1
5 TABLET ORAL EVERY 4 HOURS PRN
Qty: 40 TABLET | Refills: 0 | Status: SHIPPED | OUTPATIENT
Start: 2022-05-05 | End: 2022-05-06 | Stop reason: SDUPTHER

## 2022-05-05 RX ORDER — BUPIVACAINE HYDROCHLORIDE 5 MG/ML
INJECTION, SOLUTION PERINEURAL
Status: COMPLETED | OUTPATIENT
Start: 2022-05-05 | End: 2022-05-05

## 2022-05-05 RX ORDER — MORPHINE SULFATE 4 MG/ML
4 INJECTION, SOLUTION INTRAMUSCULAR; INTRAVENOUS
Status: DISCONTINUED | OUTPATIENT
Start: 2022-05-05 | End: 2022-05-06 | Stop reason: HOSPADM

## 2022-05-05 RX ORDER — LABETALOL HYDROCHLORIDE 5 MG/ML
5 INJECTION, SOLUTION INTRAVENOUS
Status: DISCONTINUED | OUTPATIENT
Start: 2022-05-05 | End: 2022-05-05 | Stop reason: HOSPADM

## 2022-05-05 RX ADMIN — CEFAZOLIN SODIUM 2 G: 2 INJECTION, SOLUTION INTRAVENOUS at 18:17

## 2022-05-05 RX ADMIN — ONDANSETRON 4 MG: 2 INJECTION INTRAMUSCULAR; INTRAVENOUS at 11:52

## 2022-05-05 RX ADMIN — MELOXICAM 15 MG: 15 TABLET ORAL at 09:06

## 2022-05-05 RX ADMIN — SODIUM CHLORIDE 120 ML/HR: 9 INJECTION, SOLUTION INTRAVENOUS at 18:17

## 2022-05-05 RX ADMIN — BUPIVACAINE HYDROCHLORIDE 30 ML: 2.5 INJECTION, SOLUTION EPIDURAL; INFILTRATION; INTRACAUDAL; PERINEURAL at 12:30

## 2022-05-05 RX ADMIN — PROPOFOL 80 MG: 10 INJECTION, EMULSION INTRAVENOUS at 10:06

## 2022-05-05 RX ADMIN — SODIUM CHLORIDE, POTASSIUM CHLORIDE, SODIUM LACTATE AND CALCIUM CHLORIDE 9 ML/HR: 600; 310; 30; 20 INJECTION, SOLUTION INTRAVENOUS at 08:50

## 2022-05-05 RX ADMIN — PREGABALIN 150 MG: 150 CAPSULE ORAL at 09:06

## 2022-05-05 RX ADMIN — PROPOFOL 60 MCG/KG/MIN: 10 INJECTION, EMULSION INTRAVENOUS at 10:10

## 2022-05-05 RX ADMIN — ACETAMINOPHEN 1000 MG: 500 TABLET ORAL at 09:06

## 2022-05-05 RX ADMIN — TRANEXAMIC ACID 1000 MG: 10 INJECTION, SOLUTION INTRAVENOUS at 10:09

## 2022-05-05 RX ADMIN — FAMOTIDINE 20 MG: 20 TABLET ORAL at 09:06

## 2022-05-05 RX ADMIN — BUPIVACAINE HYDROCHLORIDE 1.8 ML: 5 INJECTION, SOLUTION PERINEURAL at 10:08

## 2022-05-05 RX ADMIN — CEFAZOLIN SODIUM 2 G: 2 INJECTION, SOLUTION INTRAVENOUS at 10:02

## 2022-05-05 RX ADMIN — ROPIVACAINE HYDROCHLORIDE 10 ML/HR: 5 INJECTION, SOLUTION EPIDURAL; INFILTRATION; PERINEURAL at 12:29

## 2022-05-05 RX ADMIN — DEXAMETHASONE SODIUM PHOSPHATE 8 MG: 4 INJECTION, SOLUTION INTRA-ARTICULAR; INTRALESIONAL; INTRAMUSCULAR; INTRAVENOUS; SOFT TISSUE at 10:14

## 2022-05-05 RX ADMIN — TRANEXAMIC ACID 1000 MG: 10 INJECTION, SOLUTION INTRAVENOUS at 11:29

## 2022-05-05 RX ADMIN — LIDOCAINE HYDROCHLORIDE 0.2 ML: 10 INJECTION, SOLUTION EPIDURAL; INFILTRATION; INTRACAUDAL; PERINEURAL at 08:50

## 2022-05-05 RX ADMIN — OXYCODONE 5 MG: 5 TABLET ORAL at 23:13

## 2022-05-05 RX ADMIN — LIDOCAINE HYDROCHLORIDE 50 MG: 10 INJECTION, SOLUTION EPIDURAL; INFILTRATION; INTRACAUDAL; PERINEURAL at 10:06

## 2022-05-05 RX ADMIN — MUPIROCIN 1 APPLICATION: 20 OINTMENT TOPICAL at 09:06

## 2022-05-05 NOTE — PLAN OF CARE
Problem: Adult Inpatient Plan of Care  Goal: Plan of Care Review  Flowsheets (Taken 5/5/2022 1850)  Progress: improving  Plan of Care Reviewed With:   patient   spouse  Outcome Evaluation: PT eval complete. Pt ambulated 400 feet using RW and CGA x2.  Pt ascended/descended 2 steps using RW, CGA x2, and backwards technique. Gait/stair training limited by fatigue and weakness. R knee buckling noted all throughout gait, requiring KI to be donned. Bed mobility performed with supervision and STS with CGA. Pt IND with SLR. R knee AROM measured at 3-105 degrees. Reviewed HEP and knee precautions via handout. PADD score = 8. After discussion with patient and family, PT recommends pt d/c home with assist tomorrow following PT/OT sessions and when medically appropriate, due to pt's R LE weakness and buckling. KI to be donned with all OOB mobility and pt will be reassessed by PT in the am. Recommend OPPT.   Goal Outcome Evaluation:  Plan of Care Reviewed With: patient, spouse        Progress: improving  Outcome Evaluation: PT eval complete. Pt ambulated 400 feet using RW and CGA x2.  Pt ascended/descended 2 steps using RW, CGA x2, and backwards technique. Gait/stair training limited by fatigue and weakness. R knee buckling noted all throughout gait, requiring KI to be donned. Bed mobility performed with supervision and STS with CGA. Pt IND with SLR. R knee AROM measured at 3-105 degrees. Reviewed HEP and knee precautions via handout. PADD score = 8. After discussion with patient and family, PT recommends pt d/c home with assist tomorrow following PT/OT sessions and when medically appropriate, due to pt's R LE weakness and buckling. KI to be donned with all OOB mobility and pt will be reassessed by PT in the am. Recommend OPPT.

## 2022-05-05 NOTE — ANESTHESIA PREPROCEDURE EVALUATION
Anesthesia Evaluation     Patient summary reviewed and Nursing notes reviewed   no history of anesthetic complications:  NPO Solid Status: > 8 hours  NPO Liquid Status: > 2 hours           Airway   Mallampati: III  TM distance: >3 FB  Neck ROM: full  Possible difficult intubation  Dental - normal exam     Pulmonary    (+) sleep apnea on CPAP, decreased breath sounds,   Cardiovascular   Exercise tolerance: good (4-7 METS)    ECG reviewed  Rhythm: regular  Rate: normal    (+) hypertension well controlled 2 medications or greater,       Neuro/Psych  (+) numbness, psychiatric history Depression,    GI/Hepatic/Renal/Endo    (+) obesity,  GERD well controlled,  renal disease, diabetes mellitus type 2 well controlled, thyroid problem hypothyroidism    Musculoskeletal     Abdominal   (+) obese,     Abdomen: soft.   Substance History      OB/GYN          Other   arthritis,                      Anesthesia Plan    ASA 3     spinal     intravenous induction     Anesthetic plan, all risks, benefits, and alternatives have been provided, discussed and informed consent has been obtained with: patient.    Plan discussed with CRNA.        CODE STATUS:

## 2022-05-05 NOTE — ADDENDUM NOTE
Addendum  created 05/05/22 1232 by Sarah Edwards, CRNA    Order list changed, Order sets accessed, Pharmacy for encounter modified

## 2022-05-05 NOTE — ANESTHESIA PROCEDURE NOTES
Spinal Block      Patient reassessed immediately prior to procedure    Patient location during procedure: OR  Indication:at surgeon's request  Performed ByNA: Naz Cardozo SRNA  Preanesthetic Checklist  Completed: patient identified, IV checked, site marked, risks and benefits discussed, surgical consent, monitors and equipment checked, pre-op evaluation and timeout performed  Spinal Block Prep:  Patient Position:sitting  Sterile Tech:cap, gloves, sterile barriers and mask  Prep:Chloraprep  Patient Monitoring:blood pressure monitoring, continuous pulse oximetry and EKG  Spinal Block Procedure  Approach:midline  Guidance:landmark technique and palpation technique  Location:L4-L5  Needle Type:Sprotte  Needle Gauge:25 G  Placement of Spinal needle event:cerebrospinal fluid aspirated  Paresthesia: no  Fluid Appearance:clear  Medications: bupivacaine (MARCAINE) 0.5 % injection, 1.8 mL  Med Administered at 5/5/2022 10:08 AM   Post Assessment  Patient Tolerance:patient tolerated the procedure well with no apparent complications  Complications no  Additional Notes  Procedure:  Pt assisted to sitting position, with legs in position of comfort over side of bed.  Pt. instructed in optimal spine presentation, the spine was prepped/ Draped and the skin at insertion site was anesthetized with 1% Lidocaine 2 ml.  The spinal needle was then advanced until CSF flow was obtained and LA was injected:

## 2022-05-05 NOTE — ANESTHESIA POSTPROCEDURE EVALUATION
Patient: Darrion Mendoza    Procedure Summary     Date: 05/05/22 Room / Location:  [facility]    Anesthesia Start: 1002 Anesthesia Stop:     Procedure: TOTAL KNEE ARTHROPLASTY WITH CORI ROBOT (Right Knee) Diagnosis:       Primary osteoarthritis of right knee      (Primary osteoarthritis of right knee [M17.11])    Surgeons: Matt Marie MD Provider: Lopez Calderon MD    Anesthesia Type: spinal ASA Status: 3          Anesthesia Type: spinal    Vitals  Vitals Value Taken Time   /70 05/05/22 1228   Temp 98 °F (36.7 °C) 05/05/22 1228   Pulse 67 05/05/22 1228   Resp 24 05/05/22 1228   SpO2 93 % 05/05/22 1228           Post Anesthesia Care and Evaluation    Patient location during evaluation: PACU  Patient participation: complete - patient cannot participate  Level of consciousness: responsive to physical stimuli  Pain score: 0  Pain management: adequate  Airway patency: patent  Anesthetic complications: No anesthetic complications    Cardiovascular status: stable  Respiratory status: acceptable, nasal cannula, oral airway and spontaneous ventilation  Hydration status: stable    Comments: Pt transferred to PACU with O2. Vital signs stable. Report to PACU RN and care accepted.

## 2022-05-05 NOTE — THERAPY EVALUATION
Patient Name: Darrion Mendoza  : 1955    MRN: [id number]                              Today's Date: 2022       Admit Date: 2022    Visit Dx:     ICD-10-CM ICD-9-CM   1. S/P total knee arthroplasty, right  Z96.651 V43.65   2. Primary osteoarthritis of right knee  M17.11 715.16   3. Elevated partial thromboplastin time (PTT)  R79.1 790.92   4. ANDREIA on CPAP  G47.33 327.23    Z99.89 V46.8   5. Antiphospholipid antibody positive  R76.0 795.79   6. Acquired hypothyroidism  E03.9 244.9   7. Postmenopausal  Z78.0 V49.81   8. Chronic pain of both knees  M25.561 719.46    M25.562 338.29    G89.29    9. Bilateral primary osteoarthritis of knee  M17.0 715.16   10. Post-menopausal  Z78.0 V49.81   11. Need for diphtheria-tetanus-pertussis (Tdap) vaccine  Z23 V06.1   12. Gastroesophageal reflux disease without esophagitis  K21.9 530.81   13. Annual physical exam  Z00.00 V70.0   14. Carpal tunnel syndrome of right wrist  G56.01 354.0   15. Prediabetes  R73.03 790.29   16. Other depression  F32.89 311   17. Primary hypertension  I10 401.9     Patient Active Problem List   Diagnosis   • HTN (hypertension)   • Depression   • Prediabetes   • Carpal tunnel syndrome of right wrist   • Annual physical exam   • Gastroesophageal reflux disease without esophagitis   • Need for diphtheria-tetanus-pertussis (Tdap) vaccine   • Post-menopausal   • Bilateral primary osteoarthritis of knee   • Chronic pain of both knees   • Postmenopausal   • Acquired hypothyroidism   • Antiphospholipid antibody positive   • Primary osteoarthritis of right knee   • ANDREIA on CPAP   • Obesity   • S/P total knee arthroplasty, right   • Elevated partial thromboplastin time (PTT)     Past Medical History:   Diagnosis Date   • Arthritis    • Depression    • Disease of thyroid gland    • Esophageal ring    • GERD (gastroesophageal reflux disease)    • H/O LEEP    • Hearing loss in left ear    • HTN (hypertension)    • Kidney stones    • ANDREIA (obstructive  sleep apnea)     wears cpap, setting 6-12   • Prediabetes      Past Surgical History:   Procedure Laterality Date   • BREAST BIOPSY Right 2006   • CHOLECYSTECTOMY  2014   • COLONOSCOPY  2011   • ESOPHAGEAL DILATATION  2009   • NEPHRECTOMY PARTIAL Right 2014   • UPPER GASTROINTESTINAL ENDOSCOPY  2011      General Information     Row Name 05/05/22 1540          Physical Therapy Time and Intention    Document Type evaluation  -ADDI     Mode of Treatment individual therapy;physical therapy  -ADDI     Row Name 05/05/22 1540          General Information    Patient Profile Reviewed yes  -ADDI     Prior Level of Function min assist:;all household mobility;transfer;bed mobility;ADL's  -ADDI     Existing Precautions/Restrictions fall;brace worn when out of bed;other (see comments)  R adductor nerve cath, KI donned when OOB  -ADDI     Barriers to Rehab none identified  -ADDI     Row Name 05/05/22 1540          Living Environment    People in Home spouse  -ADDI     Row Name 05/05/22 1540          Home Main Entrance    Number of Stairs, Main Entrance one  -ADDI     Stair Railings, Main Entrance none  -ADDI     Row Name 05/05/22 1540          Stairs Within Home, Primary    Stairs, Within Home, Primary 1  -ADDI     Number of Stairs, Within Home, Primary one  -ADDI     Row Name 05/05/22 1540          Cognition    Orientation Status (Cognition) oriented x 4  -ADDI     Row Name 05/05/22 1540          Safety Issues, Functional Mobility    Safety Issues Affecting Function (Mobility) safety precaution awareness;safety precautions follow-through/compliance  -ADDI     Impairments Affecting Function (Mobility) balance;endurance/activity tolerance;strength;range of motion (ROM)  -ADDI           User Key  (r) = Recorded By, (t) = Taken By, (c) = Cosigned By    Initials Name Provider Type    ADDI Steve Varma PT Physical Therapist               Mobility     Row Name 05/05/22 1540          Bed Mobility    Bed Mobility scooting/bridging;supine-sit  -ADDI     Scooting/Bridging  [place] (Bed Mobility) supervision;verbal cues  -ADDI     Supine-Sit [place] (Bed Mobility) supervision;verbal cues  -ADDI     Assistive Device (Bed Mobility) bed rails;head of bed elevated  -     Comment, (Bed Mobility) Verbal cues for LE sequencing off of EOB and trunk control into sitting  -     Row Name 05/05/22 1540          Transfers    Comment, (Transfers) KI donned in chair  -     Row Name 05/05/22 1540          Sit-Stand Transfer    Sit-Stand [place] (Transfers) verbal cues;contact guard;2 person assist  -ADDI     Assistive Device (Sit-Stand Transfers) walker, front-wheeled  -ADDI     Comment, (Sit-Stand Transfer) Verbal cues for safe hand placement during standing/sitting and moving R LE out for comfort prior to sitting  -     Row Name 05/05/22 1540          Gait/Stairs (Locomotion)    [place] Level (Gait) verbal cues;contact guard;2 person assist  -ADDI     Assistive Device (Gait) walker, front-wheeled  -ADDI     Distance in Feet (Gait) 400  -ADDI     Deviations/Abnormal Patterns (Gait) bilateral deviations;leonela decreased;gait speed decreased;weight shifting decreased  -ADDI     Bilateral Gait Deviations forward flexed posture  -ADDI     Right Sided Gait Deviations knee buckling, right side   -     [place] Level (Stairs) verbal cues;contact guard;2 person assist  -ADDI     Assistive Device (Stairs) walker, front-wheeled  -ADDI     Number of Steps (Stairs) 2  backwards technique  -ADDI     Ascending Technique (Stairs) step-to-step  -ADDI     Descending Technique (Stairs) step-to-step  -ADDI     Comment, (Gait/Stairs) Pt ambulated with step through pattern and decreased speed. Verbal cues for maintaining upright posture, body within walker, increase step length, and WB through LEs. Pt ascended/descended 2 steps using RW, CGA x2, and backwards technique. Gait/stair training limited by fatigue and weakness. R knee buckling noted throughout gait, requiring KI to be donned.  -     Row Name 05/05/22  1540          Mobility    Extremity Weight-bearing Status right lower extremity  -     Right Lower Extremity (Weight-bearing Status) weight-bearing as tolerated (WBAT)  -           User Key  (r) = Recorded By, (t) = Taken By, (c) = Cosigned By    Initials Name Provider Type    ADDI Steve Varma, PT Physical Therapist               Obj/Interventions     Row Name 05/05/22 1540          Range of Motion Comprehensive    General Range of Motion lower extremity range of motion deficits identified  -     Comment, General Range of Motion R knee AROM 3-105 degrees; L LE AROM WFL; able to actively DF/PF  -[facility] Name 05/05/22 1540          Strength Comprehensive (MMT)    General Manual Muscle Testing (MMT) Assessment lower extremity strength deficits identified  -     Comment, General Manual Muscle Testing (MMT) Assessment R LE functionally 4-/5; L LE functionally 4+/5; IND with SLR  -[facility] Name 05/05/22 1540          Motor Skills    Therapeutic Exercise hip;knee;ankle;other (see comments)  heel raises 3x  -[facility] Name 05/05/22 1540          Hip (Therapeutic Exercise)    Hip (Therapeutic Exercise) isometric exercises  -     Hip Isometrics (Therapeutic Exercise) gluteal sets;10 repetitions  -[facility] Name 05/05/22 1540          Knee (Therapeutic Exercise)    Knee (Therapeutic Exercise) isometric exercises;strengthening exercise  -     Knee Isometrics (Therapeutic Exercise) quad sets;10 repetitions  -     Knee Strengthening (Therapeutic Exercise) right;heel slides;SLR (straight leg raise);SAQ (short arc quad);LAQ (long arc quad);3 repetitions  -[facility] Name 05/05/22 1540          Ankle (Therapeutic Exercise)    Ankle (Therapeutic Exercise) AROM (active range of motion)  -     Ankle AROM (Therapeutic Exercise) bilateral;dorsiflexion;plantarflexion;10 repetitions  -[facility] Name 05/05/22 1540          Sensory Assessment (Somatosensory)    Sensory Assessment (Somatosensory) LE sensation intact  -            User Key  (r) = Recorded By, (t) = Taken By, (c) = Cosigned By    Initials Name Provider Type    ADDI Steve Varma, PT Physical Therapist               Goals/Plan     Row Name 05/05/22 1540          Bed Mobility Goal 1 (PT)    Activity/Assistive Device (Bed Mobility Goal 1, PT) sit to supine/supine to sit  -ADDI     [place] Level/Cues Needed (Bed Mobility Goal 1, PT) modified independence  -ADDI     Time Frame (Bed Mobility Goal 1, PT) long term goal (LTG);3 days  -ADDI     Row Name 05/05/22 1540          Transfer Goal 1 (PT)    Activity/Assistive Device (Transfer Goal 1, PT) sit-to-stand/stand-to-sit;walker, rolling  -ADDI     [place] Level/Cues Needed (Transfer Goal 1, PT) modified independence  -ADDI     Time Frame (Transfer Goal 1, PT) long term goal (LTG);3 days  -ADDI     Row Name 05/05/22 1540          Gait Training Goal 1 (PT)    Activity/Assistive Device (Gait Training Goal 1, PT) gait (walking locomotion);walker, rolling  -ADDI     [place] Level (Gait Training Goal 1, PT) modified independence  -ADDI     Distance (Gait Training Goal 1, PT) 500 feet  -ADDI     Time Frame (Gait Training Goal 1, PT) long term goal (LTG);3 days  -ADDI     Row Name 05/05/22 1540          ROM Goal 1 (PT)    ROM Goal 1 (PT) R knee AROM 0-110 degrees  -ADDI     Time Frame (ROM Goal 1, PT) long-term goal (LTG);3 days  -     Row Name 05/05/22 1540          Stairs Goal 1 (PT)    Activity/Assistive Device (Stairs Goal 1, PT) stairs, all skills;walker, rolling  -ADDI     [place] Level/Cues Needed (Stairs Goal 1, PT) modified independence  -ADDI     Number of Stairs (Stairs Goal 1, PT) 2  backwards technique  -ADDI     Time Frame (Stairs Goal 1, PT) long term goal (LTG);3 days  -ADDI     Row Name 05/05/22 1540          Therapy Assessment/Plan (PT)    Planned Therapy Interventions (PT) balance training;bed mobility training;gait training;home exercise program;patient/family education;transfer training;ROM (range of motion);stair  training;strengthening  -ADDI           User Key  (r) = Recorded By, (t) = Taken By, (c) = Cosigned By    Initials Name Provider Type    Steve Caceres, PT Physical Therapist               Clinical Impression     Row Name 05/05/22 1540          Pain    Pretreatment Pain Rating 0/10 - no pain  -ADDI     Posttreatment Pain Rating 0/10 - no pain  -ADDI     Row Name 05/05/22 1540          Therapy Assessment/Plan (PT)    Patient/Family Therapy Goals Statement (PT) To return home  -ADDI     Rehab Potential (PT) good, to achieve stated therapy goals  -ADDI     Criteria for Skilled Interventions Met (PT) yes;meets criteria;skilled treatment is necessary  -ADDI     Therapy Frequency (PT) 2 times/day  -ADDI     Row Name 05/05/22 1540          Positioning and Restraints    Pre-Treatment Position in bed  -ADDI     Post Treatment Position chair  -ADDI     In Chair notified nsg;reclined;call light within reach;encouraged to call for assist;exit alarm on;with family/caregiver;legs elevated  -ADDI           User Key  (r) = Recorded By, (t) = Taken By, (c) = Cosigned By    Initials Name Provider Type    Steve Caceres, PT Physical Therapist               Outcome Measures     Row Name 05/05/22 1540          How much help from another person do you currently need...    Turning from your back to your side while in flat bed without using bedrails? 4  -ADDI     Moving from lying on back to sitting on the side of a flat bed without bedrails? 4  -ADDI     Moving to and from a bed to a chair (including a wheelchair)? 3  -ADDI     Standing up from a chair using your arms (e.g., wheelchair, bedside chair)? 3  -ADDI     Climbing 3-5 steps with a railing? 3  -ADDI     To walk in hospital room? 3  -ADDI     AM-PAC 6 Clicks Score (PT) 20  -ADDI     Highest level of mobility 6 --> Walked 10 steps or more  -ADDI     Row Name 05/05/22 1540          PADD    Diagnosis 1  -ADDI     Gender 1  -ADDI     Age Group 1  -ADDI     Gait Distance 1  -ADDI     Assist Level 1  -ADDI     Home Support 3  -ADDI      PADD Score 8  -ADDI     Patient Preference home with outpatient rehab  -ADDI     Prediction by PADD Score directly home (with home health or out-patient rehab)  -ADDI     Row Name 05/05/22 1540          Functional Assessment    Outcome Measure Options AM-PAC 6 Clicks Basic Mobility (PT);PADD  -ADDI           User Key  (r) = Recorded By, (t) = Taken By, (c) = Cosigned By    Initials Name Provider Type    Steve Caceres, PT Physical Therapist                             Physical Therapy Education                 Title: PT OT SLP Therapies (Done)     Topic: Physical Therapy (Done)     Point: Mobility training (Done)     Learning Progress Summary           Patient Acceptance, E,D,H, VU by ADDI at 5/5/2022 1540    Comment: Educated on safe sequencing with bed mobility, ambulatory/car transfers, gait, and stair training. Reviewed HEP, knee precautions via handout and KI donning/doffing.   Significant Other Acceptance, E,D,H, VU by ADDI at 5/5/2022 1540    Comment: Educated on safe sequencing with bed mobility, ambulatory/car transfers, gait, and stair training. Reviewed HEP, knee precautions via handout and KI donning/doffing.                   Point: Home exercise program (Done)     Learning Progress Summary           Patient Acceptance, E,D,H, VU by ADDI at 5/5/2022 1540    Comment: Educated on safe sequencing with bed mobility, ambulatory/car transfers, gait, and stair training. Reviewed HEP, knee precautions via handout and KI donning/doffing.   Significant Other Acceptance, E,D,H, VU by ADDI at 5/5/2022 1540    Comment: Educated on safe sequencing with bed mobility, ambulatory/car transfers, gait, and stair training. Reviewed HEP, knee precautions via handout and KI donning/doffing.                   Point: Body mechanics (Done)     Learning Progress Summary           Patient Acceptance, E,D,H, VU by ADDI at 5/5/2022 1540    Comment: Educated on safe sequencing with bed mobility, ambulatory/car transfers, gait, and stair training.  Reviewed HEP, knee precautions via handout and KI donning/doffing.   Significant Other Acceptance, E,D,H, VU by ADDI at 5/5/2022 1540    Comment: Educated on safe sequencing with bed mobility, ambulatory/car transfers, gait, and stair training. Reviewed HEP, knee precautions via handout and KI donning/doffing.                   Point: Precautions (Done)     Learning Progress Summary           Patient Acceptance, E,D,H, VU by ADDI at 5/5/2022 1540    Comment: Educated on safe sequencing with bed mobility, ambulatory/car transfers, gait, and stair training. Reviewed HEP, knee precautions via handout and KI donning/doffing.   Significant Other Acceptance, E,D,H, VU by ADDI at 5/5/2022 1540    Comment: Educated on safe sequencing with bed mobility, ambulatory/car transfers, gait, and stair training. Reviewed HEP, knee precautions via handout and KI donning/doffing.                               User Key     Initials Effective Dates Name Provider Type Discipline    ADDI 06/16/21 -  Steve Varma, PT Physical Therapist PT              PT Recommendation and Plan  Planned Therapy Interventions (PT): balance training, bed mobility training, gait training, home exercise program, patient/family education, transfer training, ROM (range of motion), stair training, strengthening  Plan of Care Reviewed With: patient, spouse  Progress: improving  Outcome Evaluation: PT eval complete. Pt ambulated 400 feet using RW and CGA x2.  Pt ascended/descended 2 steps using RW, CGA x2, and backwards technique. Gait/stair training limited by fatigue and weakness. R knee buckling noted all throughout gait, requiring KI to be donned. Bed mobility performed with supervision and STS with CGA. Pt IND with SLR. R knee AROM measured at 3-105 degrees. Reviewed HEP and knee precautions via handout. PADD score = 8. After discussion with patient and family, PT recommends pt d/c home with assist tomorrow following PT/OT sessions and when medically appropriate, due to  pt's R LE weakness and buckling. KI to be donned with all OOB mobility and pt will be reassessed by PT in the am. Recommend OPPT.     Time Calculation:    PT Charges     Row Name 05/05/22 1540             Time Calculation    Start Time 1540  -ADDI      PT Received On 05/05/22  -ADDI      PT Goal Re-Cert Due Date 05/15/22  -ADDI              Time Calculation- PT    Total Timed Code Minutes- PT 25 minute(s)  -ADDI              Timed Charges    57526 - PT Therapeutic Exercise Minutes 6  -ADDI      81118 - Gait Training Minutes  12  -ADDI      86339 - PT Therapeutic Activity Minutes 7  -ADDI              Untimed Charges    PT Eval/Re-eval Minutes 46  -ADDI              Total Minutes    Timed Charges Total Minutes 25  -ADDI      Untimed Charges Total Minutes 46  -ADDI       Total Minutes 71  -ADDI            User Key  (r) = Recorded By, (t) = Taken By, (c) = Cosigned By    Initials Name Provider Type    Steve Caceres, PT Physical Therapist              Therapy Charges for Today     Code Description Service Date Service Provider Modifiers Qty    40955601739 HC GAIT TRAINING EA 15 MIN 5/5/2022 Steve Varma, PT GP 1    08838542606 HC PT THERAPEUTIC ACT EA 15 MIN 5/5/2022 Steve Varma, PT GP 1    07649569003 HC PT EVAL LOW COMPLEXITY 4 5/5/2022 Steve Varma, PT GP 1    21839322076 HC PT THER SUPP EA 15 MIN 5/5/2022 Steve Varma, PT GP 4          PT G-Codes  Outcome Measure Options: AM-PAC 6 Clicks Basic Mobility (PT), PADD  AM-PAC 6 Clicks Score (PT): 20    Steve Varma PT  5/5/2022

## 2022-05-05 NOTE — H&P
Patient Name: Darrion Mendoza  MRN: [id number]  : 1955  DOS: 2022    Attending: Matt Marie MD    Primary Care Provider: Indigo Paul MD      Chief complaint: Right knee Pain.    Subjective   Patient is a pleasant 66 y.o. female presented for scheduled surgery by Dr. Marie.    Per his note (The patient is a 66 y.o. female with debilitating right knee pain secondary to osteoarthritis that failed to improve in spite of conservative treatment .  Options have been discussed at length with the patient and the patient has had an extended course of conservative treatment without long-term benefit. The patient has reached the point where the patient desires total knee arthroplasty surgery and understands the risks, benefits, and alternatives. Consent was obtained. Please see my office notes for details with regard to preoperative counseling and operative rationale.).    She underwent right total knee arthroplasty under spinal anesthesia, tolerated surgery well, is admitted for further management.    Adductor canal nerve block catheter was placed by acute pain service.    Seen in PACU, doing fairly well, no complaints of nausea, vomiting, or shortness of breath.  Lower extremities are still under the effects of spinal anesthesia.    Patient has had recent evaluation by Dr. Glover with hematology due to elevated PTT.  She was diagnosed with circulating inhibitor with lupus anticoagulant positive.    He recommended a more aggressive regimen of postop DVT prophylaxis, this would be Eliquis at this point.  He also was more comfortable with inpatient management early on to ensure adequate mobilization prior to discharge home.    Allergies:  No Known Allergies    Meds:  Medications Prior to Admission   Medication Sig Dispense Refill Last Dose   • aspirin 81 MG EC tablet Take 81 mg by mouth Daily.   2022 at 0645   • buPROPion XL (WELLBUTRIN XL) 300 MG 24 hr tablet Take 1 tablet by mouth Daily. 30 tablet  2 5/5/2022 at 0645   • chlorhexidine (Betasept Surgical Scrub) 4 % external liquid Apply  topically to the appropriate area as directed Daily. 237 mL 0 5/4/2022 at Unknown time   • levothyroxine (Synthroid) 50 MCG tablet Take 1 tablet by mouth Daily. 90 tablet 3 5/5/2022 at 0645   • lisinopril-hydrochlorothiazide (PRINZIDE,ZESTORETIC) 20-12.5 MG per tablet TAKE 1 TABLET BY MOUTH EVERY DAY (Patient taking differently: Take 1 tablet by mouth Every Night.) 90 tablet 1 5/4/2022 at 2100   • mupirocin (BACTROBAN) 2 % ointment Apply into the nostril(s) as directed by provider 2 (Two) Times a Day. 22 g 0 5/4/2022 at Unknown time   • VITAMIN D PO Take 1,000 Units by mouth Daily.   5/5/2022 at 0645   • metoprolol tartrate (LOPRESSOR) 25 MG tablet Take 1 tablet by mouth 2 (Two) Times a Day. 180 tablet 3           Past Medical History:   Diagnosis Date   • Arthritis    • Depression    • Disease of thyroid gland    • Esophageal ring    • GERD (gastroesophageal reflux disease)    • H/O LEEP    • Hearing loss in left ear    • HTN (hypertension)    • Kidney stones    • ANDREIA (obstructive sleep apnea)     wears cpap, setting 6-12   • Prediabetes      Past Surgical History:   Procedure Laterality Date   • BREAST BIOPSY Right 2006   • CHOLECYSTECTOMY  2014   • COLONOSCOPY  2011   • ESOPHAGEAL DILATATION  2009   • NEPHRECTOMY PARTIAL Right 2014   • UPPER GASTROINTESTINAL ENDOSCOPY  2011     Family History   Problem Relation Age of Onset   • Atrial fibrillation Mother    • Hypertension Mother    • Heart disease Father    • Diabetes Father    • Breast cancer Paternal Aunt    • Ovarian cancer Neg Hx      Social History     Tobacco Use   • Smoking status: Never Smoker   • Smokeless tobacco: Never Used   Vaping Use   • Vaping Use: Never used   Substance Use Topics   • Alcohol use: No   • Drug use: No       Review of Systems  Pertinent items are noted in HPI, all other systems reviewed and negative    Vital Signs  /70 (BP  "Location: Left arm)   Pulse 67   Temp 98 °F (36.7 °C) (Temporal)   Resp 24   Ht 162.6 cm (64\")   Wt 88.9 kg (196 lb)   SpO2 93%   Breastfeeding No   BMI 33.64 kg/m²     Physical Exam:    General Appearance:    Alert, cooperative, in no acute distress   Head:    Normocephalic, without obvious abnormality, atraumatic   Eyes:            Lids and lashes normal, conjunctivae and sclerae normal, no   icterus, no pallor, corneas clear    Ears:    Ears appear intact with no abnormalities noted   Throat:   No oral lesions, no thrush, oral mucosa moist   Neck:   No adenopathy, supple, trachea midline, no thyromegaly         Lungs:     Clear to auscultation,respirations regular, even and                   unlabored    Heart:    Regular rhythm and normal rate, normal S1 and S2, no       murmur, no gallop   Abdomen:     Normal bowel sounds, no masses, no organomegaly, soft        non-tender, non-distended, no guarding, no rebound                 tenderness   Genitalia:    Deferred   Extremities:  Right LE, CDI dressing on knee, PNB cath present.    Pulses:   Pulses palpable and equal bilaterally   Skin:   No bleeding, bruising or rash   Neurologic:   Cranial nerves 2 - 12 grossly intact, decreased movement and sensation in the lower extremities under the effects of spinal anesthesia.      I reviewed the patient's new clinical results.             Invalid input(s): NEUTOPHILPCT,  EOSPCT  Results from last 7 days   Lab Units 05/05/22  0929   POTASSIUM mmol/L 3.8     Lab Results   Component Value Date    HGBA1C 6.40 (H) 04/22/2022      Latest Reference Range & Units 02/25/22 08:37   Glucose 65 - 99 mg/dL 113 (H)   Sodium 136 - 145 mmol/L 143   Potassium 3.5 - 5.2 mmol/L 4.1   CO2 22.0 - 29.0 mmol/L 21.9 (L)   Chloride 98 - 107 mmol/L 107   Anion Gap 5.0 - 15.0 mmol/L 14.1   Creatinine 0.57 - 1.00 mg/dL 1.19 (H)   BUN 8 - 23 mg/dL 17   BUN/Creatinine Ratio 7.0 - 25.0  14.3   Calcium 8.6 - 10.5 mg/dL 9.4   EGFR Non African Am " >60 mL/min/1.73 45 (L)   (H): Data is abnormally high  (L): Data is abnormally low     Latest Reference Range & Units 04/22/22 13:25   WBC 3.40 - 10.80 10*3/mm3 7.34   RBC 3.77 - 5.28 10*6/mm3 4.09   Hemoglobin 12.0 - 15.9 g/dL 12.5   Hematocrit 34.0 - 46.6 % 36.1   RDW 12.3 - 15.4 % 12.1 (L)   MCV 79.0 - 97.0 fL 88.3   MCH 26.6 - 33.0 pg 30.6   MCHC 31.5 - 35.7 g/dL 34.6   MPV 6.0 - 12.0 fL 10.5   Platelets 140 - 450 10*3/mm3 191   RDW-SD 37.0 - 54.0 fl 39.1   (L): Data is abnormally low      Assessment and Plan:       S/P total knee arthroplasty, right    HTN (hypertension)    Depression    Prediabetes    Acquired hypothyroidism    Antiphospholipid antibody positive    Primary osteoarthritis of right knee    ANDREIA on CPAP    Obesity    Elevated partial thromboplastin time (PTT)      Plan  1. PT/OT,  Weight bearing as tolerated right LE  2. Pain control-prns, ACB cath with ropivacaine infusion.  3. IS-encourage  4. DVT proph- Mechanicals and Eliquis twice daily for a month starting postop day 1.  5. Bowel regimen  6. Resume home medications as appropriate  7. Monitor post-op labs  8. DC planning for for home    - Hypertension:  Resume home medications as appropriate, formulary substitution when indicated.  Holding parameters.  Prn medications for elevated blood pressure.    -ANDREIA:  Continue CPAP.   Monitor O2 sats.    I discussed with patient postop management plan, she expressed understanding and agreement.      Dragon disclaimer:  Part of this encounter note is an electronic transcription/translation of spoken language to printed text. The electronic translation of spoken language may permit erroneous, or at times, nonsensical words or phrases to be inadvertently transcribed; Although I have reviewed the note for such errors, some may still exist.    Kb Eng MD  05/05/22  12:34 EDT

## 2022-05-05 NOTE — ANESTHESIA PROCEDURE NOTES
Right Adductor Canal Catheter      Patient reassessed immediately prior to procedure    Patient location during procedure: post-op  Reason for block: at surgeon's request and post-op pain management  Performed by  CRNA/CAA: Lopez Grimaldo, CRNA  Assisted by: Penelope Richter RNSRNA: Naz Cardozo SRNA  Preanesthetic Checklist  Completed: patient identified, IV checked, site marked, risks and benefits discussed, surgical consent, monitors and equipment checked, pre-op evaluation and timeout performed  Prep:  Pt Position: supine  Sterile barriers:cap, gloves, mask and sterile barriers  Prep: ChloraPrep  Patient monitoring: blood pressure monitoring, continuous pulse oximetry and EKG  Procedure  Performed under: spinal  Guidance:ultrasound guided  Images:still images obtained, printed/placed on chart    Laterality:right  Block Type:adductor canal block  Injection Technique:catheter  Needle Type:Tuohy and echogenic  Needle Gauge:18 G  Resistance on Injection: none  Catheter Size:20 G (20g)  Cath Depth at skin: 9 cm    Medications Used: bupivacaine PF (MARCAINE) 0.25 % injection, 30 mL  Med administered at 5/5/2022 12:30 PM      Post Assessment  Injection Assessment: negative aspiration for heme, incremental injection and no paresthesia on injection  Patient Tolerance:comfortable throughout block  Complications:no  Additional Notes  Procedure:             The pt was placed in the Supine position.  The Insertion site was  prepped and Draped in sterile fashion.  The pt was anesthetized with  IV Sedation( see meds).  Skin and cutaneous tissue was infiltrated and anesthetized with 1% Lidocaine 3 mls via a 25g needle.  A BBraun 4 inch 18g echogenic needle was then  inserted approximately midline, mid-thigh and advanced In-plane with Ultrasound guidance.  Normal Saline PSF was utilized for hydrodissection of tissue.  The Vastus medialis and Sartorius muscle where visualized and the needle tip was placed in the adductor  canal,  lateral to the femoral artery.  LA injection spread was visualized, injection was incremental 1-5ml, injection pressure was normal or little, no intraneural injection, no vascular injection.  LA dose was injected thru the needle(see dose above).  A BBraun 20g wire stylet catheter was placed via the needle with ultrasound visualization and confirmation with NS fluid bolus. The catheter insertion site was sealed with exofin tissue adhesive. The labeled catheter was then coiled and secured to skin with benzoin,  steristrips and CHG transparent dressing.  Appropriate labels were applied.  Thank you.

## 2022-05-05 NOTE — OP NOTE
DATE OF PROCEDURE: 05/05/22    PREOPERATIVE DIAGNOSIS: right knee arthritis      POSTOPERATIVE DIAGNOSIS:  right knee arthritis    PROCEDURES PERFORMED:   right total knee arthroplasty with [employer] components (#6 narrow cruciate retaining femur, #4 tibia, 10 mm polyethylene, with 32 three peg patella) with CORI robotic assitance     SURGEON: Matt Marie MD    ASSISTANT: Madelyn Pearce PA-C  (Madelyn Pearce PA-C was present and necessary for positioning, draping, retraction, instrumentation and closure.)    SPECIMENS: None    IMPLANTS:   Implant Name Type Inv. Item Serial No.  Lot No. LRB No. Used Action   CMT BONE PALACOS R HI/VISC 1X40 - [id number] Implant CMT BONE PALACOS R HI/VISC 1X40  [place] [id number] Right 1 Implanted   DEV CONTRL TISS STRATAFIX SPIRAL MNCRYL UD 3/0 PLS 60CM - [id number] Implant DEV CONTRL TISS STRATAFIX SPIRAL MNCRYL UD 3/0 PLS 60CM  [employer]  Right 1 Implanted   DEV CONTRL TISS STRATAFIX SYMM PDS PLUS CANDACE CT-1 45CM - [id number] Implant DEV CONTRL TISS STRATAFIX SYMM PDS PLUS CANDACE CT-1 45CM  [employer]  [employer]  Right 1 Implanted   PAT GEN2 RESRF 32MM - [id number] Implant PAT GEN2 RESRF 32MM  [employer] [id number] Right 1 Implanted   COMP FEM LEGION OXINIUM CR NRW  SZ6N RT - [id number] Implant COMP FEM LEGION OXINIUM CR NRW  SZ6N RT  [employer] [id number] Right 1 Implanted   BASE TIB/KN GEN2 NONPOR TI SZ4 RT - [id number] Implant BASE TIB/KN GEN2 NONPOR TI SZ4 RT  [employer] [id number] Right 1 Implanted   INSRT ART LEGION CR HF XLPE SZ3TO4 10MM - [id number] Implant INSRT ART LEGION CR HF XLPE SZ3TO4 10MM  [employer] [id number] Right 1 Implanted         ANESTHESIA:  Spinal    STAFF:  Circulator: Raul Fonseca RN; Arabella Dorado RN  Scrub Person: Roberto James Amy  Vendor Representative: Matt Bedolla; Ismael Mason  Nursing Assistant: Nadira Castro PCT  Assistant: Madelyn Pearce  BINDU    TOURNIQUET TIME: 18 minutes    ESTIMATED BLOOD LOSS: 100ml     COMPLICATIONS: None    PREOPERATIVE ANTIBIOTICS: Ancef 2 g    INDICATIONS: The patient is a 66 y.o. female with debilitating right knee pain secondary to osteoarthritis that failed to improve in spite of conservative treatment .  Options have been discussed at length with the patient and the patient has had an extended course of conservative treatment without long-term benefit. The patient has reached the point where the patient desires total knee arthroplasty surgery and understands the risks, benefits, and alternatives. Consent was obtained. Please see my office notes for details with regard to preoperative counseling and operative rationale.     DESCRIPTION OF PROCEDURE: The patient was positively identified in the preoperative holding area and brought to the operating suite and placed in a supine position. After adequate spinal anesthetic had been achieved, the right lower extremity was prepped and draped in the usual sterile fashion.  After application of a tourniquet to the right upper thigh, which was used during the procedure for a total 18 minutes during the cementation process only. Landmarks of the knee were identified and timeout procedure was performed to confirm the operative site, as well as other parameters. Following the sterile prep and drape, a skin incision was made just off the medial aspect of midline for a medial parapatellar approach. Following a sharp skin incision, dissection was carried down to the level of the extensor mechanism and a medial parapatellar arthrotomy was made and the patella was tucked into the lateral gutter.  Anterior horns of the medial and lateral menisci were removed, and ACL transected.  Osteophytes were also removed.  Description of arthritis: Bone-on-bone contact medial compartment, tricompartmental osteophytes, varus alignment.      SOLO robotic system was then employed to assist with  preparation of the femoral and tibial bone surfaces.  Femoral and tibial arrays were placed, as well as markers in both the femur and tibia.  System was registered in a systematic fashion, and 3D models created of both the femoral and tibial aspects.  Range of motion and gap assessments were also performed, and implants were selected and appropriately sized and oriented both the femur and tibial aspects starting with the flexion gap, and then progressing to the extension gap.  Planning was made for a 6 femoral component and a 4 tibial component with a 11 mm insert.  Once balancing had been achieved, distal femoral surface was then prepared with the CORI arlette, followed by preparation for the 4-in-1 cutting block.  Proximal tibial cut was then performed in a guided fashion, posterior condyles were freed of osteophytes, and trial implants placed, namely a 6 narrow cruciate retaining femur with a 4 tibia and a 10 mm trial insert.  Range of motion and gap assessments were again performed, and excellent balancing was noted.  The patella was then prepared for a 32 three peg patella which had excellent tracking.      Therefore the trial components were removed, and preparations made for final placement, and final components were cemented in place with namely a #4 tibia, #6 narrow cruciate retaining femur, and a 32 three peg patella with a trial 10 mm insert for cement compression. All the excess cement was removed from the bone implant interface and allowed to harden. Tourniquet was deflated. Hemostasis was obtained with electrocautery. There was no brisk bleeding noted in the popliteal fossa in particular. Therefore, the knee was copiously irrigated as it was between major steps, and the final 10 mm insert was placed as this was deemed appropriate for the patient's anatomy with full flexion and extension and no instability and attention was then directed towards closure. The medial parapatellar arthrotomy was closed with  #1 Vicryl in an interrupted figure-of-eight fashion in 4 strategic locations followed by oversewing this from proximal to distal with a #1 StrataFix symmetric, which nicely sealed the joint, followed by closure of the deep fascial layer with #1 Vicryl in a buried interrupted fashion, followed by closure of the subcutaneous layer with 2-0 Vicryl and the skin with 3-0 Stratafix in a running subcuticular fashion.  Adhesive wound closure dressing was applied followed by a sterile dressing with 4 x 4's, abdominal pad, soft roll and Ace wrap. The patient tolerated the procedure well and was brought to the recovery room in good condition.     PLAN:  1.  The patient will begin early range of motion and weight-bearing per the post total knee arthroplasty protocol.   2.  I anticipate brief hospitalization for initial rehabilitation and pain control followed by continued rehabilitation home health/outpatient physical therapy setting.  Patient may be ready for discharge later today if she is doing well with therapy and with good pain control and cleared medically.  Follow-up with me in 3 weeks as planned.   3.  Postoperative medical management with Dr. Eng.  4.  Eliquis will be utilized for DVT prophylaxis.  Followed by Dr. Glover in hematology with elevated PTT, and he recommends aggressive postoperative physical therapy and the high end of prophylactic postoperative anticoagulation, therefore we have chosen Eliquis.      Matt Marie MD  05/05/22  12:10 EDT

## 2022-05-05 NOTE — H&P
Pre-Op H&P  Darrion Mendoza  [id number]  1955      Chief complaint: Right knee pain      Subjective:  Patient is a 66 y.o.female presents for scheduled surgery by Dr. Marie.  She anticipates a TOTAL KNEE ARTHROPLASTY WITH CORI ROBOT today.  Her knee has been painful for over 5 years. The pain is worse with walking, standing, climbing stairs and rising from seated position; sitting and lying down improve the pain. She denies use of assistive device for ambulation or recent falls.  She has had steroid injections in the past that only helped for short time.  OF note, she was followed by hematology for elevated PTT due to circulating inhibitor with lupus anticoagulant positive; and was ordered a baby aspirin daily.     Review of Systems:  Constitutional-- No fever, chills or sweats. No fatigue.  CV-- No chest pain, palpitation or syncope. +HTN  Resp-- No SOB, cough, hemoptysis. +ANDREIA on cpap  Skin--No rashes or lesions      Allergies: No Known Allergies      Home Meds:  Medications Prior to Admission   Medication Sig Dispense Refill Last Dose   • aspirin 81 MG EC tablet Take 81 mg by mouth Daily.   5/5/2022 at 0645   • buPROPion XL (WELLBUTRIN XL) 300 MG 24 hr tablet Take 1 tablet by mouth Daily. 30 tablet 2 5/5/2022 at 0645   • chlorhexidine (Betasept Surgical Scrub) 4 % external liquid Apply  topically to the appropriate area as directed Daily. 237 mL 0 5/4/2022 at Unknown time   • levothyroxine (Synthroid) 50 MCG tablet Take 1 tablet by mouth Daily. 90 tablet 3 5/5/2022 at 0645   • lisinopril-hydrochlorothiazide (PRINZIDE,ZESTORETIC) 20-12.5 MG per tablet TAKE 1 TABLET BY MOUTH EVERY DAY (Patient taking differently: Take 1 tablet by mouth Every Night.) 90 tablet 1 5/4/2022 at 2100   • mupirocin (BACTROBAN) 2 % ointment Apply into the nostril(s) as directed by provider 2 (Two) Times a Day. 22 g 0 5/4/2022 at Unknown time   • VITAMIN D PO Take 1,000 Units by mouth Daily.   5/5/2022 at 0645   • metoprolol  "tartrate (LOPRESSOR) 25 MG tablet Take 1 tablet by mouth 2 (Two) Times a Day. 180 tablet 3          PMH:   Past Medical History:   Diagnosis Date   • Arthritis    • Depression    • Disease of thyroid gland    • Esophageal ring    • GERD (gastroesophageal reflux disease)    • H/O LEEP    • Hearing loss in left ear    • HTN (hypertension)    • Kidney stones    • ANDREIA (obstructive sleep apnea)     wears cpap, setting 6-12   • Prediabetes      PSH:    Past Surgical History:   Procedure Laterality Date   • BREAST BIOPSY Right 2006   • CHOLECYSTECTOMY  2014   • COLONOSCOPY  2011   • ESOPHAGEAL DILATATION  2009   • NEPHRECTOMY PARTIAL Right 2014   • UPPER GASTROINTESTINAL ENDOSCOPY  2011       Immunization History:  Influenza: No  Pneumococcal: No  Tetanus: UTD  Covid x3: 2021    Social History:   Tobacco:   Social History     Tobacco Use   Smoking Status Never Smoker   Smokeless Tobacco Never Used      Alcohol:     Social History     Substance and Sexual Activity   Alcohol Use No         Physical Exam:/89 (BP Location: Right arm, Patient Position: Sitting)   Pulse 72   Temp 97.3 °F (36.3 °C) (Temporal)   Resp 18   Ht 162.6 cm (64\")   Wt 88.9 kg (196 lb)   SpO2 100%   Breastfeeding No   BMI 33.64 kg/m²       General Appearance:    Alert, cooperative, no distress, appears stated age   Head:    Normocephalic, without obvious abnormality, atraumatic   Lungs:     Clear to auscultation bilaterally, respirations unlabored    Heart:   Regular rate and rhythm, S1 and S2 normal    Abdomen:    Soft without tenderness   Extremities:   Extremities normal, atraumatic, no cyanosis or edema   Skin:   Skin color, texture, turgor normal, no rashes or lesions   Neurologic:   Grossly intact     Results Review:     LABS:  Lab Results   Component Value Date    WBC 7.34 04/22/2022    HGB 12.5 04/22/2022    HCT 36.1 04/22/2022    MCV 88.3 04/22/2022     04/22/2022    NEUTROABS 4.34 02/25/2022    GLUCOSE 113 (H) 02/25/2022    " BUN 17 02/25/2022    CREATININE 1.19 (H) 02/25/2022    EGFRIFNONA 45 (L) 02/25/2022    EGFRIFAFRI 57 (L) 01/13/2020     02/25/2022    K 4.1 02/25/2022     02/25/2022    CO2 21.9 (L) 02/25/2022    CALCIUM 9.4 02/25/2022    ALBUMIN 5.00 11/22/2021    AST 20 11/22/2021    ALT 28 11/22/2021    BILITOT 0.4 11/22/2021       RADIOLOGY:  9/22/21 knee xray:  Study Result    Narrative & Impression   Right Knee Radiographs  Indication: right knee pain  Views: Standing AP's and skiers of both knees, with lateral and sunrise views of the right knee     Comparison: 10/7/2020     Findings:   Para bone contact medial compartment, tricompartment osteophytes, varus alignment, mild worsening overall compared to the previous imaging.  No unusual bony features.       I reviewed the patient's new clinical results.    Cancer Staging (if applicable)  Cancer Patient: __ yes __no __unknown; If yes, clinical stage T:__ N:__M:__, stage group or __N/A      Impression: Primary osteoarthritis of right knee      Plan: TOTAL KNEE ARTHROPLASTY WITH CORI ROBOT      JESIKA Rdz   5/5/2022   09:17 EDT     Agree with above - plan for right TKA    Matt Marie MD  05/05/22  09:33 EDT

## 2022-05-05 NOTE — CASE MANAGEMENT/SOCIAL WORK
Continued Stay Note  [facility]     Patient Name: Darrion Mendoza  MRN: [id number]  Today's Date: 5/5/2022    Admit Date: 5/5/2022     Discharge Plan     Row Name 05/05/22 8049       Plan    Plan HOme    Plan Comments Plan: [facility] in place. [employer] provided bedside commode and rolling walker with both delivered in room    I spoke with patient and her spouse in regards to discharge planning. They plan to stay on one level of their home for awhile. SHe has the following DME: CPAP, grab bars, bath bench, cane and wheelchair.  She is agreeable to have [employer] provide a bedside commode and rolling walker and has been delivered to her room. She denies the need for any other DME. We discussed outpatient PT and [facility] has been arranged for her. They have the referral and will reach out to her. She cares for her handicapped sister and has arrangements in place for caregivers for her. SHe has Medicare and [employer] [employer] insurance and denies any concerns of coverage or benefits. Her PCP is Indigo Paul. She has advanced directives.    Final Discharge Disposition Code 01 - home or self-care               Discharge Codes    No documentation.                     Shireen Workman RN

## 2022-05-06 VITALS
OXYGEN SATURATION: 96 % | WEIGHT: 196 LBS | HEIGHT: 64 IN | HEART RATE: 77 BPM | DIASTOLIC BLOOD PRESSURE: 64 MMHG | TEMPERATURE: 98.2 F | RESPIRATION RATE: 18 BRPM | SYSTOLIC BLOOD PRESSURE: 129 MMHG | BODY MASS INDEX: 33.46 KG/M2

## 2022-05-06 PROBLEM — D62 ACUTE BLOOD LOSS ANEMIA: Status: ACTIVE | Noted: 2022-05-06

## 2022-05-06 LAB
ANION GAP SERPL CALCULATED.3IONS-SCNC: 11 MMOL/L (ref 5–15)
BUN SERPL-MCNC: 19 MG/DL (ref 8–23)
BUN/CREAT SERPL: 18.3 (ref 7–25)
CALCIUM SPEC-SCNC: 8.5 MG/DL (ref 8.6–10.5)
CHLORIDE SERPL-SCNC: 106 MMOL/L (ref 98–107)
CO2 SERPL-SCNC: 23 MMOL/L (ref 22–29)
CREAT SERPL-MCNC: 1.04 MG/DL (ref 0.57–1)
DEPRECATED RDW RBC AUTO: 42.7 FL (ref 37–54)
EGFRCR SERPLBLD CKD-EPI 2021: 59.4 ML/MIN/1.73
ERYTHROCYTE [DISTWIDTH] IN BLOOD BY AUTOMATED COUNT: 13 % (ref 12.3–15.4)
GLUCOSE SERPL-MCNC: 142 MG/DL (ref 65–99)
HCT VFR BLD AUTO: 30.1 % (ref 34–46.6)
HGB BLD-MCNC: 10.2 G/DL (ref 12–15.9)
MCH RBC QN AUTO: 31 PG (ref 26.6–33)
MCHC RBC AUTO-ENTMCNC: 33.9 G/DL (ref 31.5–35.7)
MCV RBC AUTO: 91.5 FL (ref 79–97)
PLATELET # BLD AUTO: 190 10*3/MM3 (ref 140–450)
PMV BLD AUTO: 11.1 FL (ref 6–12)
POTASSIUM SERPL-SCNC: 3.8 MMOL/L (ref 3.5–5.2)
RBC # BLD AUTO: 3.29 10*6/MM3 (ref 3.77–5.28)
SODIUM SERPL-SCNC: 140 MMOL/L (ref 136–145)
WBC NRBC COR # BLD: 12.42 10*3/MM3 (ref 3.4–10.8)

## 2022-05-06 PROCEDURE — 94799 UNLISTED PULMONARY SVC/PX: CPT

## 2022-05-06 PROCEDURE — 97165 OT EVAL LOW COMPLEX 30 MIN: CPT

## 2022-05-06 PROCEDURE — 80048 BASIC METABOLIC PNL TOTAL CA: CPT | Performed by: INTERNAL MEDICINE

## 2022-05-06 PROCEDURE — 25010000002 CEFAZOLIN IN DEXTROSE 2-4 GM/100ML-% SOLUTION: Performed by: ORTHOPAEDIC SURGERY

## 2022-05-06 PROCEDURE — 97535 SELF CARE MNGMENT TRAINING: CPT

## 2022-05-06 PROCEDURE — 85027 COMPLETE CBC AUTOMATED: CPT | Performed by: ORTHOPAEDIC SURGERY

## 2022-05-06 PROCEDURE — 97110 THERAPEUTIC EXERCISES: CPT

## 2022-05-06 PROCEDURE — 97116 GAIT TRAINING THERAPY: CPT

## 2022-05-06 PROCEDURE — 94660 CPAP INITIATION&MGMT: CPT

## 2022-05-06 PROCEDURE — 99024 POSTOP FOLLOW-UP VISIT: CPT | Performed by: ORTHOPAEDIC SURGERY

## 2022-05-06 RX ORDER — ASPIRIN 81 MG/1
81 TABLET ORAL DAILY
Start: 2022-05-06 | End: 2022-09-21 | Stop reason: SDUPTHER

## 2022-05-06 RX ORDER — POLYETHYLENE GLYCOL 3350 17 G/17G
17 POWDER, FOR SOLUTION ORAL DAILY
Qty: 238 G | Refills: 0 | Status: SHIPPED | OUTPATIENT
Start: 2022-05-06 | End: 2022-05-21

## 2022-05-06 RX ORDER — OXYCODONE HYDROCHLORIDE 5 MG/1
5 TABLET ORAL EVERY 4 HOURS PRN
Qty: 40 TABLET | Refills: 0 | Status: SHIPPED | OUTPATIENT
Start: 2022-05-06 | End: 2022-07-06

## 2022-05-06 RX ADMIN — APIXABAN 2.5 MG: 2.5 TABLET, FILM COATED ORAL at 08:46

## 2022-05-06 RX ADMIN — SODIUM CHLORIDE 500 ML: 9 INJECTION, SOLUTION INTRAVENOUS at 10:39

## 2022-05-06 RX ADMIN — OXYCODONE 5 MG: 5 TABLET ORAL at 10:04

## 2022-05-06 RX ADMIN — Medication 10 ML: at 08:47

## 2022-05-06 RX ADMIN — MELOXICAM 15 MG: 7.5 TABLET ORAL at 08:47

## 2022-05-06 RX ADMIN — CEFAZOLIN SODIUM 2 G: 2 INJECTION, SOLUTION INTRAVENOUS at 02:18

## 2022-05-06 NOTE — PLAN OF CARE
Goal Outcome Evaluation:  Plan of Care Reviewed With: patient        Progress: improving  Outcome Evaluation: DC'd home

## 2022-05-06 NOTE — PLAN OF CARE
Goal Outcome Evaluation:  VSS. Pain controlled with PRN meds. Ambulated x2 in [place], standby assist. Will continue to monitor.

## 2022-05-06 NOTE — PLAN OF CARE
Goal Outcome Evaluation:  Plan of Care Reviewed With: patient        Progress: improving  Outcome Evaluation: PATIENT DEMONSTRATED SAFE MOBIITY WTIH WALKER. NO KNEE IMMOBILIZER NEED. NO BUCKLING OF LOSS OF BALANCE NOTED. READY FOR HOME

## 2022-05-06 NOTE — PLAN OF CARE
Goal Outcome Evaluation:           Progress: improving  Outcome Evaluation: Patient was paricipating in exercise and had and episode of dizziness and low BP. Ambulation deferred this am for patient to recover from this.

## 2022-05-06 NOTE — THERAPY TREATMENT NOTE
Patient Name: Darrion Mendoza  : 1955    MRN: [id number]                              Today's Date: 2022       Admit Date: 2022    Visit Dx:     ICD-10-CM ICD-9-CM   1. S/P total knee arthroplasty, right  Z96.651 V43.65   2. Primary osteoarthritis of right knee  M17.11 715.16   3. Elevated partial thromboplastin time (PTT)  R79.1 790.92   4. ANDREIA on CPAP  G47.33 327.23    Z99.89 V46.8   5. Antiphospholipid antibody positive  R76.0 795.79   6. Acquired hypothyroidism  E03.9 244.9   7. Postmenopausal  Z78.0 V49.81   8. Chronic pain of both knees  M25.561 719.46    M25.562 338.29    G89.29    9. Bilateral primary osteoarthritis of knee  M17.0 715.16   10. Post-menopausal  Z78.0 V49.81   11. Need for diphtheria-tetanus-pertussis (Tdap) vaccine  Z23 V06.1   12. Gastroesophageal reflux disease without esophagitis  K21.9 530.81   13. Annual physical exam  Z00.00 V70.0   14. Carpal tunnel syndrome of right wrist  G56.01 354.0   15. Prediabetes  R73.03 790.29   16. Other depression  F32.89 311   17. Primary hypertension  I10 401.9     Patient Active Problem List   Diagnosis   • HTN (hypertension)   • Depression   • Prediabetes   • Carpal tunnel syndrome of right wrist   • Annual physical exam   • Gastroesophageal reflux disease without esophagitis   • Need for diphtheria-tetanus-pertussis (Tdap) vaccine   • Post-menopausal   • Bilateral primary osteoarthritis of knee   • Chronic pain of both knees   • Postmenopausal   • Acquired hypothyroidism   • Antiphospholipid antibody positive   • Primary osteoarthritis of right knee   • ANDREIA on CPAP   • Obesity   • S/P total knee arthroplasty, right   • Elevated partial thromboplastin time (PTT)   • Acute blood loss anemia, mild, asymptomatic     Past Medical History:   Diagnosis Date   • Arthritis    • Depression    • Disease of thyroid gland    • Esophageal ring    • GERD (gastroesophageal reflux disease)    • H/O LEEP    • Hearing loss in left ear    • HTN  (hypertension)    • Kidney stones    • ANDREIA (obstructive sleep apnea)     wears cpap, setting 6-12   • Prediabetes      Past Surgical History:   Procedure Laterality Date   • BREAST BIOPSY Right 2006   • CHOLECYSTECTOMY  2014   • COLONOSCOPY  2011   • ESOPHAGEAL DILATATION  2009   • NEPHRECTOMY PARTIAL Right 2014   • TOTAL KNEE ARTHROPLASTY Right 5/5/2022    Procedure: TOTAL KNEE ARTHROPLASTY WITH CORI ROBOT;  Surgeon: Matt Marie MD;  Location: [facility];  Service: Robotics - Ortho;  Laterality: Right;   • UPPER GASTROINTESTINAL ENDOSCOPY  2011      General Information     Row Name 05/06/22 1151          Physical Therapy Time and Intention    Document Type therapy note (daily note)  -SC     Mode of Treatment physical therapy  -SC     Row Name 05/06/22 1151          General Information    Patient Profile Reviewed yes  -SC     Existing Precautions/Restrictions fall;brace worn when out of bed;other (see comments)  nerve cath  -SC     Row Name 05/06/22 1151          Cognition    Orientation Status (Cognition) oriented x 4  -[facility] Name 05/06/22 1151          Safety Issues, Functional Mobility    Impairments Affecting Function (Mobility) balance;endurance/activity tolerance;strength;range of motion (ROM);other (see comments)  BP drop with +dizziness  -SC     Comment, Safety Issues/Impairments (Mobility) alert, following commands  -SC           User Key  (r) = Recorded By, (t) = Taken By, (c) = Cosigned By    Initials Name Provider Type    SC Carly Torres PT Physical Therapist               Mobility     Row Name 05/06/22 1152          Bed Mobility    Comment, (Bed Mobility) UIC  -SC     Row Name 05/06/22 1152          Transfers    Comment, (Transfers) deferred_ c/o dizziness during exercise with drop in BPDe  -[facility] Name 05/06/22 1152          Gait/Stairs (Locomotion)    Comment, (Gait/Stairs) Deferred due to + dizziness during exercise and drop in BP. RN notified  -SC     Row Name 05/06/22 1152           Mobility    Extremity Weight-bearing Status right lower extremity  -SC     Right Lower Extremity (Weight-bearing Status) weight-bearing as tolerated (WBAT)  -SC           User Key  (r) = Recorded By, (t) = Taken By, (c) = Cosigned By    Initials Name Provider Type    Carly Gross, PT Physical Therapist               Obj/Interventions     Row Name 05/06/22 1153          Range of Motion Comprehensive    Comment, General Range of Motion 10-80 R knee  -[facility] Name 05/06/22 1153          Motor Skills    Therapeutic Exercise knee;ankle  -[facility] Name 05/06/22 1153          Knee (Therapeutic Exercise)    Knee (Therapeutic Exercise) AROM (active range of motion)  -SC     Knee AROM (Therapeutic Exercise) right;SAQ (short arc quad);SLR (straight leg raise);LAQ (long arc quad);heel slides;10 repetitions;sitting;other (see comments)  c/o dizziness while doing heel slides in sitting BP droped. Patient layed back . and RN called  -SC     Knee Isometrics (Therapeutic Exercise) quad sets;10 repetitions;sitting  -SC           User Key  (r) = Recorded By, (t) = Taken By, (c) = Cosigned By    Initials Name Provider Type    SC Carly Torres, PT Physical Therapist               Goals/Plan    No documentation.                Clinical Impression     [facility] Name 05/06/22 1155          Pain    Pretreatment Pain Rating 8/10  -SC     Posttreatment Pain Rating 9/10  -SC     Pain Location - Side/Orientation Right  -SC     Pain Location - knee  -SC     Pain Intervention(s) Repositioned;Cold applied  -[facility] Name 05/06/22 115          Plan of Care Review    Progress improving  -SC     Outcome Evaluation Patient was paricipating in exercise and had and episode of dizziness and low BP. Ambulation deferred this am for patient to recover from this.  -SC     Row Name 05/06/22 1158          Therapy Assessment/Plan (PT)    Patient/Family Therapy Goals Statement (PT) return home  -SC     Rehab Potential (PT) good, to achieve stated  therapy goals  -SC     Criteria for Skilled Interventions Met (PT) yes;meets criteria;skilled treatment is necessary  -SC     Therapy Frequency (PT) 2 times/day  -SC     Row Name 05/06/22 1155          Vital Signs    Intra Systolic BP Rehab 89  -SC     Intra Treatment Diastolic BP 52  -[facility] Name 05/06/22 1155          Positioning and Restraints    Pre-Treatment Position sitting in chair/recliner  -SC     Post Treatment Position chair  -SC     In Chair notified nsg;reclined;sitting;call light within reach;encouraged to call for assist;exit alarm on;with family/caregiver  -SC           User Key  (r) = Recorded By, (t) = Taken By, (c) = Cosigned By    Initials Name Provider Type    SC Carly Torres, PT Physical Therapist               Outcome Measures     Row Name 05/06/22 1157          How much help from another person do you currently need...    Turning from your back to your side while in flat bed without using bedrails? 4  -SC     Moving from lying on back to sitting on the side of a flat bed without bedrails? 4  -SC     Moving to and from a bed to a chair (including a wheelchair)? 3  -SC     Standing up from a chair using your arms (e.g., wheelchair, bedside chair)? 3  -SC     Climbing 3-5 steps with a railing? 3  -SC     To walk in hospital room? 3  -SC     AM-PAC 6 Clicks Score (PT) 20  -SC     Highest level of mobility 6 --> Walked 10 steps or more  -SC     Row Name 05/06/22 1157 05/06/22 1043       Functional Assessment    Outcome Measure Options AM-PAC 6 Clicks Basic Mobility (PT)  -SC AM-PAC 6 Clicks Daily Activity (OT)  -AN          User Key  (r) = Recorded By, (t) = Taken By, (c) = Cosigned By    Initials Name Provider Type    SC Carly Torres PT Physical Therapist    Araceli Murphy OT Occupational Therapist                             Physical Therapy Education                 Title: PT OT SLP Therapies (In Progress)     Topic: Physical Therapy (Done)     Point: Mobility training (Done)      Learning Progress Summary           Patient Eager, E, VU by SC at 5/6/2022 1157    Comment: reviewed HEP    Acceptance, E,D,H, VU by ADDI at 5/5/2022 1540    Comment: Educated on safe sequencing with bed mobility, ambulatory/car transfers, gait, and stair training. Reviewed HEP, knee precautions via handout and KI donning/doffing.   Significant Other Acceptance, E,D,H, VU by ADDI at 5/5/2022 1540    Comment: Educated on safe sequencing with bed mobility, ambulatory/car transfers, gait, and stair training. Reviewed HEP, knee precautions via handout and KI donning/doffing.                   Point: Home exercise program (Done)     Learning Progress Summary           Patient Eager, E, VU by SC at 5/6/2022 1157    Comment: reviewed HEP    Acceptance, E,D,H, VU by ADDI at 5/5/2022 1540    Comment: Educated on safe sequencing with bed mobility, ambulatory/car transfers, gait, and stair training. Reviewed HEP, knee precautions via handout and KI donning/doffing.   Significant Other Acceptance, E,D,H, VU by ADDI at 5/5/2022 1540    Comment: Educated on safe sequencing with bed mobility, ambulatory/car transfers, gait, and stair training. Reviewed HEP, knee precautions via handout and KI donning/doffing.                   Point: Body mechanics (Done)     Learning Progress Summary           Patient Eager, E, VU by SC at 5/6/2022 1157    Comment: reviewed HEP    Acceptance, E,D,H, VU by ADDI at 5/5/2022 1540    Comment: Educated on safe sequencing with bed mobility, ambulatory/car transfers, gait, and stair training. Reviewed HEP, knee precautions via handout and KI donning/doffing.   Significant Other Acceptance, E,D,H, VU by ADDI at 5/5/2022 1540    Comment: Educated on safe sequencing with bed mobility, ambulatory/car transfers, gait, and stair training. Reviewed HEP, knee precautions via handout and KI donning/doffing.                   Point: Precautions (Done)     Learning Progress Summary           Patient Eager, E, VU by SC at  5/6/2022 1157    Comment: reviewed HEP    Acceptance, E,D,H, VU by ADDI at 5/5/2022 1540    Comment: Educated on safe sequencing with bed mobility, ambulatory/car transfers, gait, and stair training. Reviewed HEP, knee precautions via handout and KI donning/doffing.   Significant Other Acceptance, E,D,H, VU by ADDI at 5/5/2022 1540    Comment: Educated on safe sequencing with bed mobility, ambulatory/car transfers, gait, and stair training. Reviewed HEP, knee precautions via handout and KI donning/doffing.                               User Key     Initials Effective Dates Name Provider Type Discipline    SC 06/16/21 -  Carly Torres PT Physical Therapist PT    ADDI 06/16/21 -  Steve Varma PT Physical Therapist PT              PT Recommendation and Plan     Progress: improving  Outcome Evaluation: Patient was paricipating in exercise and had and episode of dizziness and low BP. Ambulation deferred this am for patient to recover from this.     Time Calculation:    PT Charges     Row Name 05/06/22 1010             Time Calculation    Start Time 1010  -SC              Time Calculation- PT    Total Timed Code Minutes- PT 25 minute(s)  -SC              Timed Charges    26708 - PT Therapeutic Exercise Minutes 25  -SC              Total Minutes    Timed Charges Total Minutes 25  -SC       Total Minutes 25  -SC            User Key  (r) = Recorded By, (t) = Taken By, (c) = Cosigned By    Initials Name Provider Type    SC Carly Torres, PT Physical Therapist              Therapy Charges for Today     Code Description Service Date Service Provider Modifiers Qty    84438330400 HC PT THER PROC EA 15 MIN 5/6/2022 Carly Torres PT GP 2          PT G-Codes  Outcome Measure Options: AM-PAC 6 Clicks Basic Mobility (PT)  AM-PAC 6 Clicks Score (PT): 20  AM-PAC 6 Clicks Score (OT): 19    Carly Torres PT  5/6/2022

## 2022-05-06 NOTE — PLAN OF CARE
Goal Outcome Evaluation:  Plan of Care Reviewed With: patient        Progress: no change (OT evaluation)  Outcome Evaluation: OT evaluation completed. Pt presents with generalized weakness, decreased balance, and impaired functional mobility limiting independence with ADLs. Adaptive equipment issued and education provided. Pt performed bed mobility with supervision, STS using RW with stand by, and ambulated household distance using RW with stand by. KI donned for OOB mobility. OT rec home with assist at [place]. No further OT sessions required at this time.

## 2022-05-06 NOTE — PROGRESS NOTES
"      [facility] Progress Note    Subjective      LOS: 0 days   Patient Care Team:  Indigo Paul MD as PCP - General (Family Medicine)  Matt Marie MD as Consulting Physician (Orthopedic Surgery)    CC: Right knee pain    Interval History:   Patient sitting comfortably in a chair.  Pain well controlled.    Objective      Vital Signs  Temp (24hrs), Av.7 °F (36.5 °C), Min:97 °F (36.1 °C), Max:98.2 °F (36.8 °C)      /62 (BP Location: Left arm, Patient Position: Lying)   Pulse 70   Temp 98.2 °F (36.8 °C) (Oral)   Resp 18   Ht 162.6 cm (64\")   Wt 88.9 kg (196 lb)   SpO2 96%   Breastfeeding No   BMI 33.64 kg/m²     Examination:   Examination of the right knee: The wound is clean, dry, and intact.  Ankle dorsiflexion, ankle plantar flexion, and EHL are intact.  Sensation intact in the foot to light touch.  2+ dorsalis pedis pulse.  Straight leg raise is intact.      Labs:  Results from last 7 days   Lab Units 22  0551   WBC 10*3/mm3 12.42*   HEMOGLOBIN g/dL 10.2*   HEMATOCRIT % 30.1*   MCV fL 91.5   PLATELETS 10*3/mm3 190       Radiology:  Imaging Results (Last 24 Hours)     Procedure Component Value Units Date/Time    XR Knee 1 or 2 View Right [[id number]] Collected: 22 125     Updated: 22 125    Narrative:      DATE OF EXAM: 2022 12:46 PM     PROCEDURE: XR KNEE 1 OR 2 VW RIGHT-     INDICATIONS: post-op     COMPARISON: Right knee radiographs 2021     TECHNIQUE: 2 images of the right knee     FINDINGS:  Expected postoperative appearance of a newly placed right total knee  arthroplasty with cemented femoral and tibial components appearing in  satisfactory alignment without evidence of hardware complication or  periprosthetic fracture. Ghost tracks are noted in the proximal tibial  shaft. There are expected postsurgical soft tissue changes including  subcutaneous and intra-articular air.        Impression:      Expected postoperative appearance of a newly " placed right total knee  arthroplasty.     This report was finalized on 5/5/2022 12:53 PM by Hardeep Mei MD.             PT:  Physical Therapy - Plan of Care Review - Outcome Summary:  Outcome Evaluation: PT eval complete. Pt ambulated 400 feet using RW and CGA x2.  Pt ascended/descended 2 steps using RW, CGA x2, and backwards technique. Gait/stair training limited by fatigue and weakness. R knee buckling noted all throughout gait, requiring KI to be donned. Bed mobility performed with supervision and STS with CGA. Pt IND with SLR. R knee AROM measured at 3-105 degrees. Reviewed HEP and knee precautions via handout. PADD score = 8. After discussion with patient and family, PT recommends pt d/c home with assist tomorrow following PT/OT sessions and when medically appropriate, due to pt's R LE weakness and buckling. KI to be donned with all OOB mobility and pt will be reassessed by PT in the am. Recommend OPPT. (05/05/22 7602)]       Results Review:     I reviewed the patient's new clinical results.    Assessment and Plan     Assessment:   Status post right total knee arthroplasty-doing well      S/P total knee arthroplasty, right    HTN (hypertension)    Depression    Prediabetes    Acquired hypothyroidism    Antiphospholipid antibody positive    Primary osteoarthritis of right knee    ANDREIA on CPAP    Obesity    Elevated partial thromboplastin time (PTT)    Acute blood loss anemia, mild, asymptomatic      Plan for disposition: Plan for discharge later today if she is cleared medically and by physical therapy.  Follow-up with me in 3 weeks as planned.      Future Appointments   Date Time Provider Department Center   5/23/2022 10:50 AM Sera Mendoza PA-C [facility]           Matt Marie MD  05/06/22  10:32 EDT

## 2022-05-06 NOTE — PROGRESS NOTES
Patient is on Apixaban.  Education provided on 5/6 verbally and in writing.  Information provided includes effects of medication, drug-drug and drug-food interactions, and signs/symptoms of bleeding and clotting.  Patient verbalized understanding through teach back.  All pertinent questions were answered.

## 2022-05-06 NOTE — DISCHARGE SUMMARY
Patient Name: Darrion Mendoza  MRN: [id number]  : 1955  DOS: 2022    Attending: Matt Marie MD    Primary Care Provider: Indigo Paul MD    Date of Admission:.2022  7:39 AM    Date of Discharge:  2022    Discharge Diagnosis:   S/P total knee arthroplasty, right    HTN (hypertension)    Depression    Prediabetes    Acquired hypothyroidism    Antiphospholipid antibody positive    Primary osteoarthritis of right knee    ANDREIA on CPAP    Obesity    Elevated partial thromboplastin time (PTT)    Acute blood loss anemia, mild, asymptomatic      Hospital Course    At admit:  Patient is a pleasant 66 y.o. female presented for scheduled surgery by Dr. Marie.     Per his note (The patient is a 66 y.o. female with debilitating right knee pain secondary to osteoarthritis that failed to improve in spite of conservative treatment .  Options have been discussed at length with the patient and the patient has had an extended course of conservative treatment without long-term benefit. The patient has reached the point where the patient desires total knee arthroplasty surgery and understands the risks, benefits, and alternatives. Consent was obtained. Please see my office notes for details with regard to preoperative counseling and operative rationale.).     She underwent right total knee arthroplasty under spinal anesthesia, tolerated surgery well, is admitted for further management.     Adductor canal nerve block catheter was placed by acute pain service.     Seen in PACU, doing fairly well, no complaints of nausea, vomiting, or shortness of breath.  Lower extremities are still under the effects of spinal anesthesia.     Patient has had recent evaluation by Dr. Glover with hematology due to elevated PTT.  She was diagnosed with circulating inhibitor with lupus anticoagulant positive.     He recommended a more aggressive regimen of postop DVT prophylaxis, this would be Eliquis at this point.  He also was  more comfortable with inpatient management early on to ensure adequate mobilization prior to discharge home.     After admit:    Patient was provided pain medications as needed for pain control, along with adductor canal nerve block infusion of Ropivacaine.      Adjustments were made to pain medications to optimize postop pain management. Risks and benefits of opiate medications discussed with patient. [facility] report was reviewed.    She was seen by PT and OT and has progressed well over her stay.    Patient used an IS for atelectasis prophylaxis and Eliquis along with mechanicals for DVT prophylaxis.(Noting the recommendation by Dr. Glover who she saw in consultation preoperatively)    Home medications were resumed as appropriate, and labs were monitored and remained fairly stable.     With the progress she has made, Ms. Mendoza is ready for DC home today.      She will have an Arrow pump ( instructed on it during this admit).    Discussed with patient regarding plan and she shows understanding and agreement.    Patient will have HHPT following discharge.        Procedures Performed  Procedure(s):  TOTAL KNEE ARTHROPLASTY WITH CORI ROBOT       Pertinent Test Results:    I reviewed the patient's new clinical results.   Results from last 7 days   Lab Units 05/06/22  0551   WBC 10*3/mm3 12.42*   HEMOGLOBIN g/dL 10.2*   HEMATOCRIT % 30.1*   PLATELETS 10*3/mm3 190     Results from last 7 days   Lab Units 05/06/22  0551 05/05/22  0929   SODIUM mmol/L 140  --    POTASSIUM mmol/L 3.8 3.8   CHLORIDE mmol/L 106  --    CO2 mmol/L 23.0  --    BUN mg/dL 19  --    CREATININE mg/dL 1.04*  --    CALCIUM mg/dL 8.5*  --    GLUCOSE mg/dL 142*  --      I reviewed the patient's new imaging including images and reports.      Physical therapy  Progress: improving  Outcome Evaluation: PATIENT DEMONSTRATED SAFE MOBIITY WTIH WALKER. NO KNEE IMMOBILIZER NEED. NO BUCKLING OF LOSS OF BALANCE NOTED. READY FOR HOME                  Discharge  "Assessment:       Visit Vitals  /62 (BP Location: Left arm, Patient Position: Lying)   Pulse 70   Temp 98.2 °F (36.8 °C) (Oral)   Resp 18   Ht 162.6 cm (64\")   Wt 88.9 kg (196 lb)   SpO2 96%   Breastfeeding No   BMI 33.64 kg/m²     Temp (24hrs), Av.7 °F (36.5 °C), Min:97 °F (36.1 °C), Max:98.2 °F (36.8 °C)      General Appearance:    Alert, cooperative, in no acute distress   Lungs:     Clear to auscultation,respirations regular, even and                   unlabored    Heart:    Regular rhythm and normal rate, normal S1 and S2   Abdomen:     Normal bowel sounds, no masses, no organomegaly, soft        non-tender, non-distended, no guarding, no rebound                 tenderness   Extremities:   CDI dressing surgical knee . ACB cath present, arrow pump.   Pulses:   Pulses palpable and equal bilaterally   Skin:   No bleeding, bruising or rash   Neurologic:   Cranial nerves 2 - 12 grossly intact, sensation intact, Flexion and dorsiflexion intact bilateral feet.         Discharge Disposition: Home          Discharge Medications      New Medications      Instructions Start Date   acetaminophen 500 MG tablet  Commonly known as: TYLENOL   1,000 mg, Oral, Every 8 Hours, Take every 8 hours  as needed after 1 week      apixaban 2.5 MG tablet tablet  Commonly known as: ELIQUIS   2.5 mg, Oral, Every 12 Hours Scheduled      oxyCODONE 5 MG immediate release tablet  Commonly known as: Roxicodone   5 mg, Oral, Every 4 Hours PRN      polyethylene glycol 17 g packet  Commonly known as: MIRALAX   17 g, Oral, Daily      Ropivacine HCl-NaCl  Commonly known as: NAROPIN   10 mL/hr (20 mg/hr), Peripheral Nerve, Continuous         Changes to Medications      Instructions Start Date   lisinopril-hydrochlorothiazide 20-12.5 MG per tablet  Commonly known as: PRINZIDE,ZESTORETIC  What changed: when to take this   TAKE 1 TABLET BY MOUTH EVERY DAY         Continue These Medications      Instructions Start Date   aspirin 81 MG EC " tablet   81 mg, Oral, Daily      Betasept Surgical Scrub 4 % external liquid  Generic drug: chlorhexidine   Topical, Daily      buPROPion  MG 24 hr tablet  Commonly known as: Wellbutrin XL   300 mg, Oral, Daily      levothyroxine 50 MCG tablet  Commonly known as: Synthroid   50 mcg, Oral, Daily      VITAMIN D PO   1,000 Units, Oral, Daily         Stop These Medications    metoprolol tartrate 25 MG tablet  Commonly known as: LOPRESSOR     mupirocin 2 % ointment  Commonly known as: BACTROBAN        Patient has not started metoprolol yet can be started after consulting with PCP on follow-up    Discharge Diet: Resume prehospital diet    Activity at Discharge: Ambulate.  Weightbearing as tolerated right lower extremity    Follow-up Appointments  5/23/2022 10:50 AM Sera Mendoza PA-C [facility]         Dragon disclaimer:  Part of this encounter note is an electronic transcription/translation of spoken language to printed text. The electronic translation of spoken language may permit erroneous, or at times, nonsensical words or phrases to be inadvertently transcribed; Although I have reviewed the note for such errors, some may still exist.       Kb Eng MD  05/06/22  08:55 EDT

## 2022-05-06 NOTE — THERAPY DISCHARGE NOTE
Acute Care - Occupational Therapy Discharge  [facility]    Patient Name: Darrion Mendoza  : 1955    MRN: [id number]                              Today's Date: 2022       Admit Date: 2022    Visit Dx:     ICD-10-CM ICD-9-CM   1. S/P total knee arthroplasty, right  Z96.651 V43.65   2. Primary osteoarthritis of right knee  M17.11 715.16   3. Elevated partial thromboplastin time (PTT)  R79.1 790.92   4. ANDREIA on CPAP  G47.33 327.23    Z99.89 V46.8   5. Antiphospholipid antibody positive  R76.0 795.79   6. Acquired hypothyroidism  E03.9 244.9   7. Postmenopausal  Z78.0 V49.81   8. Chronic pain of both knees  M25.561 719.46    M25.562 338.29    G89.29    9. Bilateral primary osteoarthritis of knee  M17.0 715.16   10. Post-menopausal  Z78.0 V49.81   11. Need for diphtheria-tetanus-pertussis (Tdap) vaccine  Z23 V06.1   12. Gastroesophageal reflux disease without esophagitis  K21.9 530.81   13. Annual physical exam  Z00.00 V70.0   14. Carpal tunnel syndrome of right wrist  G56.01 354.0   15. Prediabetes  R73.03 790.29   16. Other depression  F32.89 311   17. Primary hypertension  I10 401.9     Patient Active Problem List   Diagnosis   • HTN (hypertension)   • Depression   • Prediabetes   • Carpal tunnel syndrome of right wrist   • Annual physical exam   • Gastroesophageal reflux disease without esophagitis   • Need for diphtheria-tetanus-pertussis (Tdap) vaccine   • Post-menopausal   • Bilateral primary osteoarthritis of knee   • Chronic pain of both knees   • Postmenopausal   • Acquired hypothyroidism   • Antiphospholipid antibody positive   • Primary osteoarthritis of right knee   • ANDREIA on CPAP   • Obesity   • S/P total knee arthroplasty, right   • Elevated partial thromboplastin time (PTT)   • Acute blood loss anemia, mild, asymptomatic     Past Medical History:   Diagnosis Date   • Arthritis    • Depression    • Disease of thyroid gland    • Esophageal ring    • GERD (gastroesophageal reflux disease)     • H/O LEEP    • Hearing loss in left ear    • HTN (hypertension)    • Kidney stones    • ANDREIA (obstructive sleep apnea)     wears cpap, setting 6-12   • Prediabetes      Past Surgical History:   Procedure Laterality Date   • BREAST BIOPSY Right 2006   • CHOLECYSTECTOMY  2014   • COLONOSCOPY  2011   • ESOPHAGEAL DILATATION  2009   • NEPHRECTOMY PARTIAL Right 2014   • TOTAL KNEE ARTHROPLASTY Right 5/5/2022    Procedure: TOTAL KNEE ARTHROPLASTY WITH CORI ROBOT;  Surgeon: Matt Marie MD;  Location: [facility];  Service: Robotics - Ortho;  Laterality: Right;   • UPPER GASTROINTESTINAL ENDOSCOPY  2011      General Information     Row Name 05/06/22 1028          OT Time and Intention    Document Type discharge evaluation/summary  -AN     Mode of Treatment occupational therapy  -AN     Row Name 05/06/22 1028          General Information    Patient Profile Reviewed yes  -AN     Prior Level of Function min assist:;all household mobility;gait;ADL's  -AN     Existing Precautions/Restrictions fall;brace worn when out of bed;other (see comments)  R adductor nerve cath, KI donned when OOB  -AN     Barriers to Rehab none identified  -AN     Row Name 05/06/22 1028          Occupational Profile    Occupational History/Life Experiences (Occupational Profile) tub shower with shower chair  -AN     Row Name 05/06/22 1028          Living Environment    People in Home spouse  -AN     Row Name 05/06/22 1028          Home Main Entrance    Number of Stairs, Main Entrance one  -AN     Stair Railings, Main Entrance none  -AN     Row Name 05/06/22 1028          Stairs Within Home, Primary    Number of Stairs, Within Home, Primary one  -AN     Row Name 05/06/22 1028          Cognition    Orientation Status (Cognition) oriented x 4  -AN     Row Name 05/06/22 1028          Safety Issues, Functional Mobility    Safety Issues Affecting Function (Mobility) safety precaution awareness;safety precautions follow-through/compliance  -AN      Impairments Affecting Function (Mobility) balance;endurance/activity tolerance;strength;range of motion (ROM)  -AN           User Key  (r) = Recorded By, (t) = Taken By, (c) = Cosigned By    Initials Name Provider Type    Araceli Murphy OT Occupational Therapist               Mobility/ADL's     Row Name 05/06/22 1031          Bed Mobility    Supine-Sit [place] (Bed Mobility) supervision;verbal cues  -AN     Assistive Device (Bed Mobility) bed rails;head of bed elevated  -AN     Row Name 05/06/22 1031          Transfers    Transfers sit-stand transfer;stand-sit transfer  -AN     Sit-Stand [place] (Transfers) verbal cues;standby assist  -AN     Stand-Sit [place] (Transfers) verbal cues;standby assist  -AN     Row Name 05/06/22 1031          Sit-Stand Transfer    Assistive Device (Sit-Stand Transfers) walker, front-wheeled  -AN     Row Name 05/06/22 1031          Stand-Sit Transfer    Assistive Device (Stand-Sit Transfers) walker, front-wheeled  -AN     Row Name 05/06/22 1031          Functional Mobility    Functional Mobility- Ind. Level standby assist  -AN     Functional Mobility- Device walker, front-wheeled  -AN     Functional Mobility-Distance (Feet) --  household distance  -AN     Functional Mobility- Safety Issues step length decreased;sequencing ability decreased  -AN     Row Name 05/06/22 1031          Activities of Daily Living    BADL Assessment/Intervention bathing;upper body dressing;lower body dressing;grooming  -AN     Row Name 05/06/22 1031          Mobility    Extremity Weight-bearing Status right lower extremity  -AN     Right Lower Extremity (Weight-bearing Status) weight-bearing as tolerated (WBAT)  -AN     Row Name 05/06/22 1031          Bathing Assessment/Intervention    Assistive Devices (Bathing) long-handled sponge  -AN     Comment, (Bathing) educated on no showering until nerve cath is removed, issued and educated on use of long handled sponge  -AN     Row Name 05/06/22  1031          Upper Body Dressing Assessment/Training    [place] Level (Upper Body Dressing) don;bra/undergarment  -AN     Position (Upper Body Dressing) long sitting  -AN     Comment, (Upper Body Dressing) donned bra and overhead shirt with setup  -AN     Row Name 05/06/22 1031          Lower Body Dressing Assessment/Training    [place] Level (Lower Body Dressing) don;pants/bottoms;socks;supervision  -AN     Assistive Devices (Lower Body Dressing) long-handled shoe horn;reacher;sock-aid  -AN     Position (Lower Body Dressing) supported sitting  -AN     Comment, (Lower Body Dressing) administered and educated on adaptive equipment for LB ADLs; pt doffed socks using reacher with cues and donned socks using sock aid with sock aid and cues  -AN     Row Name 05/06/22 1031          Grooming Assessment/Training    Comment, (Grooming) educated on positioning of RW in front of the sink vs off to the side for sink side grooming; pt verbalized understanding  -AN           User Key  (r) = Recorded By, (t) = Taken By, (c) = Cosigned By    Initials Name Provider Type    AN Araceli Chandler OT Occupational Therapist               Obj/Interventions     Row Name 05/06/22 1037          Sensory Assessment (Somatosensory)    Sensory Assessment (Somatosensory) UE sensation intact  -AN     Row Name 05/06/22 1037          Vision Assessment/Intervention    Visual Impairment/Limitations WFL  -AN     Row Name 05/06/22 1037          Range of Motion Comprehensive    General Range of Motion bilateral upper extremity ROM WFL  -AN     Row Name 05/06/22 1037          Strength Comprehensive (MMT)    General Manual Muscle Testing (MMT) Assessment lower extremity strength deficits identified  -AN     Comment, General Manual Muscle Testing (MMT) Assessment RLE generalized weakness observed with functional mobility  -AN     Row Name 05/06/22 1037          Balance    Balance Assessment sitting static balance;sitting dynamic balance;sit to  stand dynamic balance;standing static balance;standing dynamic balance  -AN     Static Sitting Balance independent  -AN     Dynamic Sitting Balance independent  -AN     Position, Sitting Balance sitting edge of bed  -AN     Sit to Stand Dynamic Balance verbal cues;standby assist  -AN     Static Standing Balance standby assist  -AN     Dynamic Standing Balance standby assist  -AN     Position/Device Used, Standing Balance walker, rolling  -AN     Balance Interventions standing;sit to stand;supported;static;dynamic;occupation based/functional task  -AN           User Key  (r) = Recorded By, (t) = Taken By, (c) = Cosigned By    Initials Name Provider Type    AN Araceli Chandler, RANJANA Occupational Therapist               Goals/Plan    No documentation.                Clinical Impression     Row Name 05/06/22 1039          Pain Assessment    Pretreatment Pain Rating 3/10  -AN     Posttreatment Pain Rating 3/10  -AN     Pre/Posttreatment Pain Comment tolerated  -AN     Pain Intervention(s) Repositioned;Ambulation/increased activity  -AN     Row Name 05/06/22 1039          Plan of Care Review    Plan of Care Reviewed With patient  -AN     Progress no change  OT evaluation  -AN     Outcome Evaluation OT evaluation completed. Pt presents with generalized weakness, decreased balance, and impaired functional mobility limiting independence with ADLs. Adaptive equipment issued and education provided. Pt performed bed mobility with supervision, STS using RW with stand by, and ambulated household distance using RW with stand by. KI donned for OOB mobility. OT rec home with assist at [place]. No further OT sessions required at this time.  -AN     Row Name 05/06/22 1039          Therapy Assessment/Plan (OT)    Criteria for Skilled Therapeutic Interventions Met (OT) no problems identified which require skilled intervention  -AN     Therapy Frequency (OT) evaluation only  -AN     Row Name 05/06/22 1039          Therapy Plan Review/Discharge  Plan (OT)    Anticipated Discharge Disposition (OT) home with assist  -AN     Row Name 05/06/22 1039          Vital Signs    Pre Systolic BP Rehab --  VSS  -AN     O2 Delivery Pre Treatment room air  -AN     O2 Delivery Intra Treatment room air  -AN     O2 Delivery Post Treatment room air  -AN     Pre Patient Position Supine  -AN     Intra Patient Position Standing  -AN     Post Patient Position Sitting  -AN     Row Name 05/06/22 1039          Positioning and Restraints    Pre-Treatment Position in bed  -AN     Post Treatment Position chair  -AN     In Chair reclined;notified nsg;call light within reach;encouraged to call for assist;exit alarm on;legs elevated  -AN           User Key  (r) = Recorded By, (t) = Taken By, (c) = Cosigned By    Initials Name Provider Type    Araceli Murphy OT Occupational Therapist               Outcome Measures     Row Name 05/06/22 1043          How much help from another is currently needed...    Putting on and taking off regular lower body clothing? 3  -AN     Bathing (including washing, rinsing, and drying) 3  -AN     Toileting (which includes using toilet bed pan or urinal) 3  -AN     Putting on and taking off regular upper body clothing 3  -AN     Taking care of personal grooming (such as brushing teeth) 3  -AN     Eating meals 4  -AN     AM-PAC 6 Clicks Score (OT) 19  -AN     Row Name 05/06/22 1043          Functional Assessment    Outcome Measure Options AM-PAC 6 Clicks Daily Activity (OT)  -AN           User Key  (r) = Recorded By, (t) = Taken By, (c) = Cosigned By    Initials Name Provider Type    Araceli Murphy OT Occupational Therapist              Occupational Therapy Education                 Title: PT OT SLP Therapies (In Progress)     Topic: Occupational Therapy (In Progress)     Point: ADL training (Done)     Description:   Instruct learner(s) on proper safety adaptation and remediation techniques during self care or transfers.   Instruct in proper use of  assistive devices.              Learning Progress Summary           Patient Acceptance, E, VU by AN at 5/6/2022 1044                   Point: Home exercise program (Not Started)     Description:   Instruct learner(s) on appropriate technique for monitoring, assisting and/or progressing therapeutic exercises/activities.              Learner Progress:  Not documented in this visit.          Point: Precautions (Done)     Description:   Instruct learner(s) on prescribed precautions during self-care and functional transfers.              Learning Progress Summary           Patient Acceptance, E, VU by AN at 5/6/2022 1044                   Point: Body mechanics (Done)     Description:   Instruct learner(s) on proper positioning and spine alignment during self-care, functional mobility activities and/or exercises.              Learning Progress Summary           Patient Acceptance, E, VU by AN at 5/6/2022 1044                               User Key     Initials Effective Dates Name Provider Type Discipline     09/21/21 -  Araceli Chandler OT Occupational Therapist OT              OT Recommendation and Plan  Therapy Frequency (OT): evaluation only  Plan of Care Review  Plan of Care Reviewed With: patient  Progress: no change (OT evaluation)  Outcome Evaluation: OT evaluation completed. Pt presents with generalized weakness, decreased balance, and impaired functional mobility limiting independence with ADLs. Adaptive equipment issued and education provided. Pt performed bed mobility with supervision, STS using RW with stand by, and ambulated household distance using RW with stand by. KI donned for OOB mobility. OT rec home with assist at [place]. No further OT sessions required at this time.  Plan of Care Reviewed With: patient  Outcome Evaluation: OT evaluation completed. Pt presents with generalized weakness, decreased balance, and impaired functional mobility limiting independence with ADLs. Adaptive equipment issued and  education provided. Pt performed bed mobility with supervision, STS using RW with stand by, and ambulated household distance using RW with stand by. KI donned for OOB mobility. OT rec home with assist at [place]. No further OT sessions required at this time.     Time Calculation:    Time Calculation- OT     Row Name 05/06/22 1045             Time Calculation- OT    OT Start Time 0844  -AN      OT Received On 05/06/22  -AN              Timed Charges    53197 - OT Self Care/Mgmt Minutes 8  -AN              Untimed Charges    OT Eval/Re-eval Minutes 46  -AN              Total Minutes    Timed Charges Total Minutes 8  -AN      Untimed Charges Total Minutes 46  -AN       Total Minutes 54  -AN            User Key  (r) = Recorded By, (t) = Taken By, (c) = Cosigned By    Initials Name Provider Type    AN Araceli Chandler OT Occupational Therapist              Therapy Charges for Today     Code Description Service Date Service Provider Modifiers Qty    [id number]  OT SELF CARE/MGMT/TRAIN EA 15 MIN 5/6/2022 Araceli Chandler OT GO 1    [id number]  OT EVAL LOW COMPLEXITY 4 5/6/2022 Araceli Chandler OT GO 1             OT Discharge Summary  Anticipated Discharge Disposition (OT): home with assist  Reason for Discharge: At baseline function  Discharge Destination: Home with assist    Araceli Chandler OT  5/6/2022

## 2022-05-06 NOTE — PROGRESS NOTES
[facility]    Acute pain service Inpatient Progress Note    Patient Name: Darrion Mendoza  :  1955  MRN:  [id number]        Acute Pain  Service Inpatient Progress Note:    Analgesia:Good  Pain Score:3/10  LOC: alert and awake  Resp Status: room air  Cardiac: VS stable  Side Effects:None  Catheter Site:clean, dressing intact and dry  Cath type: peripheral nerve cath with ON Q  Infusion rate: 10ml/hr  Dosing/Volume: ropivacaine 0.2%  Catheter Plan:Catheter to remain Insitu and Continue catheter infusion rate unchanged  Comments: Pt to be discharged home today

## 2022-05-06 NOTE — THERAPY DISCHARGE NOTE
Patient Name: Darrion Mendoza  : 1955    MRN: [id number]                              Today's Date: 2022       Admit Date: 2022    Visit Dx:     ICD-10-CM ICD-9-CM   1. S/P total knee arthroplasty, right  Z96.651 V43.65   2. Primary osteoarthritis of right knee  M17.11 715.16   3. Elevated partial thromboplastin time (PTT)  R79.1 790.92   4. ANDREIA on CPAP  G47.33 327.23    Z99.89 V46.8   5. Antiphospholipid antibody positive  R76.0 795.79   6. Acquired hypothyroidism  E03.9 244.9   7. Postmenopausal  Z78.0 V49.81   8. Chronic pain of both knees  M25.561 719.46    M25.562 338.29    G89.29    9. Bilateral primary osteoarthritis of knee  M17.0 715.16   10. Post-menopausal  Z78.0 V49.81   11. Need for diphtheria-tetanus-pertussis (Tdap) vaccine  Z23 V06.1   12. Gastroesophageal reflux disease without esophagitis  K21.9 530.81   13. Annual physical exam  Z00.00 V70.0   14. Carpal tunnel syndrome of right wrist  G56.01 354.0   15. Prediabetes  R73.03 790.29   16. Other depression  F32.89 311   17. Primary hypertension  I10 401.9     Patient Active Problem List   Diagnosis   • HTN (hypertension)   • Depression   • Prediabetes   • Carpal tunnel syndrome of right wrist   • Annual physical exam   • Gastroesophageal reflux disease without esophagitis   • Need for diphtheria-tetanus-pertussis (Tdap) vaccine   • Post-menopausal   • Bilateral primary osteoarthritis of knee   • Chronic pain of both knees   • Postmenopausal   • Acquired hypothyroidism   • Antiphospholipid antibody positive   • Primary osteoarthritis of right knee   • ANDREIA on CPAP   • Obesity   • S/P total knee arthroplasty, right   • Elevated partial thromboplastin time (PTT)   • Acute blood loss anemia, mild, asymptomatic     Past Medical History:   Diagnosis Date   • Arthritis    • Depression    • Disease of thyroid gland    • Esophageal ring    • GERD (gastroesophageal reflux disease)    • H/O LEEP    • Hearing loss in left ear    • HTN  (hypertension)    • Kidney stones    • ANDREIA (obstructive sleep apnea)     wears cpap, setting 6-12   • Prediabetes      Past Surgical History:   Procedure Laterality Date   • BREAST BIOPSY Right 2006   • CHOLECYSTECTOMY  2014   • COLONOSCOPY  2011   • ESOPHAGEAL DILATATION  2009   • NEPHRECTOMY PARTIAL Right 2014   • TOTAL KNEE ARTHROPLASTY Right 5/5/2022    Procedure: TOTAL KNEE ARTHROPLASTY WITH CORI ROBOT;  Surgeon: Matt Marie MD;  Location: [facility];  Service: Robotics - Ortho;  Laterality: Right;   • UPPER GASTROINTESTINAL ENDOSCOPY  2011      General Information     Row Name 05/06/22 1449 05/06/22 1151       Physical Therapy Time and Intention    Document Type therapy note (daily note)  -SC therapy note (daily note)  -SC    Mode of Treatment physical therapy  -SC physical therapy  -SC    Row Name 05/06/22 1449 05/06/22 1151       General Information    Patient Profile Reviewed yes  -SC yes  -SC    Existing Precautions/Restrictions fall  -SC fall;brace worn when out of bed;other (see comments)  nerve cath  -SC    Row Name 05/06/22 1449 05/06/22 1151       Cognition    Orientation Status (Cognition) oriented x 4  -SC oriented x 4  -SC    Row Name 05/06/22 1449 05/06/22 1151       Safety Issues, Functional Mobility    Impairments Affecting Function (Mobility) pain;range of motion (ROM)  -SC balance;endurance/activity tolerance;strength;range of motion (ROM);other (see comments)  BP drop with +dizziness  -SC    Comment, Safety Issues/Impairments (Mobility) ALERT, FOLLOWING COMMANDS, NO C/O DIZZINESS  -SC alert, following commands  -SC          User Key  (r) = Recorded By, (t) = Taken By, (c) = Cosigned By    Initials Name Provider Type    SC Carly Torres PT Physical Therapist               Mobility     Row Name 05/06/22 1450 05/06/22 1152       Bed Mobility    Comment, (Bed Mobility) UIC  -SC UIC  -SC    Row Name 05/06/22 1450 05/06/22 1152       Transfers    Comment, (Transfers) DEMONSTRATED GOOD  TRANSFER INTO STANDING WITH GOOF HAND PLACEMENT  -SC deferred_ c/o dizziness during exercise with drop in BPDe  -[facility] Name 05/06/22 1450          Sit-Stand Transfer    Sit-Stand [place] (Transfers) modified independence  -SC     Assistive Device (Sit-Stand Transfers) walker, front-wheeled  -SC     Row Name 05/06/22 1450 05/06/22 1152       Gait/Stairs (Locomotion)    [place] Level (Gait) modified independence  -SC --    Assistive Device (Gait) walker, front-wheeled  -SC --    Distance in Feet (Gait) 400  -SC --    Deviations/Abnormal Patterns (Gait) right sided deviations;antalgic;weight shifting decreased  -SC --    Bilateral Gait Deviations forward flexed posture  -SC --    Number of Steps (Stairs) 2 BACKWARDS TECHNIQUE  -SC --    Comment, (Gait/Stairs) gt TRAINING FOCUSED ON STEP THROUGH PATTERN WITH EQUAL WT SHIFTING. DEMONSTRATED GOOD CONTROL OF WALKER .NO BUCKLING. NO DIZZINESS. NO LOB  -SC Deferred due to + dizziness during exercise and drop in BP. RN notified  -[facility] Name 05/06/22 1450 05/06/22 1152       Mobility    Extremity Weight-bearing Status right lower extremity  -SC right lower extremity  -SC    Right Lower Extremity (Weight-bearing Status) weight-bearing as tolerated (WBAT)  -SC weight-bearing as tolerated (WBAT)  -SC          User Key  (r) = Recorded By, (t) = Taken By, (c) = Cosigned By    Initials Name Provider Type    SC Carly Torres, PT Physical Therapist               Obj/Interventions     Row Name 05/06/22 1153          Range of Motion Comprehensive    Comment, General Range of Motion 10-80 R knee  -[facility] Name 05/06/22 1153          Motor Skills    Therapeutic Exercise knee;ankle  -SC     Row Name 05/06/22 1153          Knee (Therapeutic Exercise)    Knee (Therapeutic Exercise) AROM (active range of motion)  -SC     Knee AROM (Therapeutic Exercise) right;SAQ (short arc quad);SLR (straight leg raise);LAQ (long arc quad);heel slides;10 repetitions;sitting;other (see  comments)  c/o dizziness while doing heel slides in sitting BP droped. Patient layed back . and RN called  -SC     Knee Isometrics (Therapeutic Exercise) quad sets;10 repetitions;sitting  -SC     Row Name 05/06/22 1452          Balance    Dynamic Sitting Balance modified independence  -SC     Position, Sitting Balance supported  -SC           User Key  (r) = Recorded By, (t) = Taken By, (c) = Cosigned By    Initials Name Provider Type    SC Carly Torres, PT Physical Therapist               Goals/Plan     Row Name 05/06/22 1455          Bed Mobility Goal 1 (PT)    Activity/Assistive Device (Bed Mobility Goal 1, PT) sit to supine/supine to sit  -SC     [place] Level/Cues Needed (Bed Mobility Goal 1, PT) modified independence  -SC     Time Frame (Bed Mobility Goal 1, PT) long term goal (LTG);3 days  -SC     Progress/Outcomes (Bed Mobility Goal 1, PT) goal met  -[facility] Name 05/06/22 1455          Transfer Goal 1 (PT)    Activity/Assistive Device (Transfer Goal 1, PT) sit-to-stand/stand-to-sit  -SC     [place] Level/Cues Needed (Transfer Goal 1, PT) modified independence  -SC     Time Frame (Transfer Goal 1, PT) long term goal (LTG);3 days  -SC     Progress/Outcome (Transfer Goal 1, PT) goal met  -[facility] Name 05/06/22 1455          Gait Training Goal 1 (PT)    [place] Level (Gait Training Goal 1, PT) modified [place]  -SC     Distance (Gait Training Goal 1, PT) 500 feet  -SC     Time Frame (Gait Training Goal 1, PT) long term goal (LTG);3 days  -SC     Progress/Outcome (Gait Training Goal 1, PT) goal partially met  -[facility] Name 05/06/22 1455          ROM Goal 1 (PT)    ROM Goal 1 (PT) R knee AROM 0-110 degrees  -SC     Time Frame (ROM Goal 1, PT) long-term goal (LTG);3 days  -SC     Progress/Outcome (ROM Goal 1, PT) goal not met  -[facility] Name 05/06/22 1455          Stairs Goal 1 (PT)    Activity/Assistive Device (Stairs Goal 1, PT) stairs, all skills;walker, rolling  -SC      [place] Level/Cues Needed (Stairs Goal 1, PT) modified independence  -SC     Number of Stairs (Stairs Goal 1, PT) 2  -SC     Time Frame (Stairs Goal 1, PT) long term goal (LTG);3 days  -SC     Progress/Outcome (Stairs Goal 1, PT) goal met  -SC           User Key  (r) = Recorded By, (t) = Taken By, (c) = Cosigned By    Initials Name Provider Type    SC Carly Torres, PT Physical Therapist               Clinical Impression     Row Name 05/06/22 1453 05/06/22 1155       Pain    Pretreatment Pain Rating -- 8/10  -SC    Posttreatment Pain Rating -- 9/10  -SC    Pain Location - Side/Orientation -- Right  -SC    Pain Location - -- knee  -SC    Pain Intervention(s) -- Repositioned;Cold applied  -SC    Additional Documentation Pain Scale: FACES Pre/Post-Treatment (Group)  -SC --    Row Name 05/06/22 1452          Pain Scale: FACES Pre/Post-Treatment    Pain: FACES Scale, Pretreatment 6-->hurts even more  -SC     Posttreatment Pain Rating 6-->hurts even more  -SC     Pain Location - Side/Orientation Right  -SC     Pain Location - knee  -SC     Row Name 05/06/22 1453 05/06/22 1153       Plan of Care Review    Plan of Care Reviewed With patient  -SC --    Progress -- improving  -SC    Outcome Evaluation PATIENT DEMONSTRATED SAFE MOBIITY WTIH WALKER. NO KNEE IMMOBILIZER NEED. NO BUCKLING OF LOSS OF BALANCE NOTED. READY FOR HOME  -SC Patient was paricipating in exercise and had and episode of dizziness and low BP. Ambulation deferred this am for patient to recover from this.  -SC    Row Name 05/06/22 1453 05/06/22 1155       Therapy Assessment/Plan (PT)    Patient/Family Therapy Goals Statement (PT) HOME  -SC return home  -SC    Rehab Potential (PT) good, to achieve stated therapy goals  -SC good, to achieve stated therapy goals  -SC    Criteria for Skilled Interventions Met (PT) yes;meets criteria;skilled treatment is necessary  -SC yes;meets criteria;skilled treatment is necessary  -SC    Therapy Frequency (PT) -- 2  times/day  -SC    Row Name 05/06/22 1155          Vital Signs    Intra Systolic BP Rehab 89  -SC     Intra Treatment Diastolic BP 52  -SC     Row Name 05/06/22 1453 05/06/22 1155       Positioning and Restraints    Pre-Treatment Position sitting in chair/recliner  -SC sitting in chair/recliner  -SC    Post Treatment Position chair  -SC chair  -SC    In Chair notified nsg;reclined;sitting;call light within reach;encouraged to call for assist;exit alarm on;with family/caregiver  -SC notified nsg;reclined;sitting;call light within reach;encouraged to call for assist;exit alarm on;with family/caregiver  -SC          User Key  (r) = Recorded By, (t) = Taken By, (c) = Cosigned By    Initials Name Provider Type    Carly Gross, PT Physical Therapist               Outcome Measures     Row Name 05/06/22 1455 05/06/22 1157       How much help from another person do you currently need...    Turning from your back to your side while in flat bed without using bedrails? 4  -SC 4  -SC    Moving from lying on back to sitting on the side of a flat bed without bedrails? 4  -SC 4  -SC    Moving to and from a bed to a chair (including a wheelchair)? 3  -SC 3  -SC    Standing up from a chair using your arms (e.g., wheelchair, bedside chair)? 3  -SC 3  -SC    Climbing 3-5 steps with a railing? 3  -SC 3  -SC    To walk in hospital room? 3  -SC 3  -SC    AM-PAC 6 Clicks Score (PT) 20  -SC 20  -SC    Highest level of mobility 6 --> Walked 10 steps or more  -SC 6 --> Walked 10 steps or more  -SC    Row Name 05/06/22 1455 05/06/22 1157       Functional Assessment    Outcome Measure Options AM-PAC 6 Clicks Basic Mobility (PT)  -SC AM-PAC 6 Clicks Basic Mobility (PT)  -[facility] Name 05/06/22 1043          Functional Assessment    Outcome Measure Options AM-PAC 6 Clicks Daily Activity (OT)  -AN           User Key  (r) = Recorded By, (t) = Taken By, (c) = Cosigned By    Initials Name Provider Type    Carly Gross PT Physical  Therapist    Araceli Murphy OT Occupational Therapist              Physical Therapy Education                 Title: PT OT SLP Therapies (Done)     Topic: Physical Therapy (Done)     Point: Mobility training (Done)     Learning Progress Summary           Patient Eager, E,TB,D, VU,DU by SC at 5/6/2022 1456    Comment: REVIEWED SAFETY WTH AMBULATION AND STAIRS    Eager, E, VU by SC at 5/6/2022 1157    Comment: reviewed HEP    Acceptance, E,D,H, VU by  at 5/5/2022 1540    Comment: Educated on safe sequencing with bed mobility, ambulatory/car transfers, gait, and stair training. Reviewed HEP, knee precautions via handout and KI donning/doffing.   Significant Other Acceptance, E,D,H, VU by  at 5/5/2022 1540    Comment: Educated on safe sequencing with bed mobility, ambulatory/car transfers, gait, and stair training. Reviewed HEP, knee precautions via handout and KI donning/doffing.                   Point: Home exercise program (Done)     Learning Progress Summary           Patient Eager, E,TB,D, VU,DU by SC at 5/6/2022 1456    Comment: REVIEWED SAFETY WTH AMBULATION AND STAIRS    Eager, E, VU by SC at 5/6/2022 1157    Comment: reviewed HEP    Acceptance, E,D,H, VU by  at 5/5/2022 1540    Comment: Educated on safe sequencing with bed mobility, ambulatory/car transfers, gait, and stair training. Reviewed HEP, knee precautions via handout and KI donning/doffing.   Significant Other Acceptance, E,D,H, VU by  at 5/5/2022 1540    Comment: Educated on safe sequencing with bed mobility, ambulatory/car transfers, gait, and stair training. Reviewed HEP, knee precautions via handout and KI donning/doffing.                   Point: Body mechanics (Done)     Learning Progress Summary           Patient Eager, E,TB,D, VU,DU by SC at 5/6/2022 1456    Comment: REVIEWED SAFETY WTH AMBULATION AND STAIRS    Eager, E, VU by SC at 5/6/2022 1157    Comment: reviewed HEP    Acceptance, E,D,H, VU by  at 5/5/2022 1540     Comment: Educated on safe sequencing with bed mobility, ambulatory/car transfers, gait, and stair training. Reviewed HEP, knee precautions via handout and KI donning/doffing.   Significant Other Acceptance, E,D,H, VU by  at 5/5/2022 1540    Comment: Educated on safe sequencing with bed mobility, ambulatory/car transfers, gait, and stair training. Reviewed HEP, knee precautions via handout and KI donning/doffing.                   Point: Precautions (Done)     Learning Progress Summary           Patient Eager, E,TB,D, VU,DU by SC at 5/6/2022 1456    Comment: REVIEWED SAFETY WTH AMBULATION AND STAIRS    [place], E, VU by SC at 5/6/2022 1157    Comment: reviewed HEP    Acceptance, E,D,H, VU by  at 5/5/2022 1540    Comment: Educated on safe sequencing with bed mobility, ambulatory/car transfers, gait, and stair training. Reviewed HEP, knee precautions via handout and KI donning/doffing.   Significant Other Acceptance, E,D,H, VU by  at 5/5/2022 1540    Comment: Educated on safe sequencing with bed mobility, ambulatory/car transfers, gait, and stair training. Reviewed HEP, knee precautions via handout and KI donning/doffing.                               User Key     Initials Effective Dates Name Provider Type Discipline    SC 06/16/21 -  Carly Torres, PT Physical Therapist PT     06/16/21 -  Steev Varma, PT Physical Therapist PT              PT Recommendation and Plan     Plan of Care Reviewed With: patient  Progress: improving  Outcome Evaluation: PATIENT DEMONSTRATED SAFE MOBIITY WTIH WALKER. NO KNEE IMMOBILIZER NEED. NO BUCKLING OF LOSS OF BALANCE NOTED. READY FOR HOME     Time Calculation:    PT Charges     Row Name 05/06/22 1307 05/06/22 1010          Time Calculation    Start Time 1307  -SC 1010  -SC     PT Received On 05/06/22  -SC --     PT Goal Re-Cert Due Date 05/15/22  -SC --            Time Calculation- PT    Total Timed Code Minutes- PT 20 minute(s)  -SC 25 minute(s)  -SC            Timed Charges     21772 - PT Therapeutic Exercise Minutes -- 25  -SC     22178 - Gait Training Minutes  20  -SC --            Total Minutes    Timed Charges Total Minutes 20  -SC 25  -SC      Total Minutes 20  -SC 25  -SC           User Key  (r) = Recorded By, (t) = Taken By, (c) = Cosigned By    Initials Name Provider Type    SC Carly Torres PT Physical Therapist              Therapy Charges for Today     Code Description Service Date Service Provider Modifiers Qty    26323093245  PT THER PROC EA 15 MIN 5/6/2022 Carly Torres PT GP 2    71596162636 HC GAIT TRAINING EA 15 MIN 5/6/2022 Carly Torres PT GP 1          PT G-Codes  Outcome Measure Options: AM-PAC 6 Clicks Basic Mobility (PT)  AM-PAC 6 Clicks Score (PT): 20  AM-PAC 6 Clicks Score (OT): 19    PT Discharge Summary  Anticipated Discharge Disposition (PT): home with assist, home with outpatient therapy services    Carly Torres PT  5/6/2022

## 2022-05-08 NOTE — PROGRESS NOTES
"  [facility]    Nerve Cath Post Op Call    Patient Name: Darrion Mendoza  :  1955  MRN:  [id number]  Date of Discharge: 2022    Nerve Cath Post Op Call:    Analgesia:Fair  Pain Score:4/10 (\"All over the knee\")  Catheter Plan:Patient/Family member report nerve catheter previously discontinued, tip intact  Patient/Family instructed to call ON CALL anesthesia provider for any questions or problems.  Patient Follow Up:                                "

## 2022-05-20 DIAGNOSIS — E03.9 HYPOTHYROIDISM, UNSPECIFIED TYPE: ICD-10-CM

## 2022-05-20 RX ORDER — LEVOTHYROXINE SODIUM 0.05 MG/1
50 TABLET ORAL DAILY
Qty: 90 TABLET | Refills: 0 | Status: SHIPPED | OUTPATIENT
Start: 2022-05-20 | End: 2022-08-16

## 2022-05-23 ENCOUNTER — OFFICE VISIT (OUTPATIENT)
Dept: ORTHOPEDIC SURGERY | Facility: CLINIC | Age: 67
End: 2022-05-23

## 2022-05-23 VITALS — TEMPERATURE: 97.1 F

## 2022-05-23 DIAGNOSIS — Z96.651 STATUS POST TOTAL RIGHT KNEE REPLACEMENT: Primary | ICD-10-CM

## 2022-05-23 PROCEDURE — 99024 POSTOP FOLLOW-UP VISIT: CPT | Performed by: PHYSICIAN ASSISTANT

## 2022-05-23 NOTE — PROGRESS NOTES
[facility] Note        Subjective     Post-op (3 weeks S/P TOTAL KNEE ARTHROPLASTY WITH CORI ROBOT RIGHT 5/5/22)       МАРИНА Mendoza is a 66 y.o. female.  Patient presents for their initial postop visit following right TKA performed on the above date by Dr. Marie.    Pain scale: 3/10.  Patient is already attending outpatient PT ([facility]).  Still notes some swelling and stiffness to the knee.  Taking Eliquis as directed for DVT prophylaxis.  Not using cane or walker to assist with ambulation.  Notes pain with walking, standing, stair climbing, sleeping, lying on the affected side, rising from a seated position, movement of joint.  No reported numbness or tingling into the extremity.    Patient denies any fever, chills, night sweats or other constitutional symptoms.          Objective      Physical Exam  Temp 97.1 °F (36.2 °C)     There is no height or weight on file to calculate BMI.        Ortho Exam  Integument:   Right knee: Incision is healing well without redness, warmth, drainage or evidence of infection.  Glue mesh dressing was removed today.    Lower Extremities:   Right knee:    Tenderness:  Generalized knee tenderness associated with TKA noted    Effusion:  1+    Swelling: None    Crepitus:  None    Range of motion:  Extension: 5°       Flexion: 100°  Instability:  No varus laxity, no valgus laxity, negative anterior drawer  Deformities:  None    Patient notes decreased sensation noted anterior aspect lower leg into knee.      Imaging Reviewed:  Ordered right knee plain films.  Imaging read/interpreted by Dr. Marie.    Imaging Results (Last 24 Hours)     Procedure Component Value Units Date/Time    XR Knee 3+ View With [place] Right [[id number]] Resulted: 05/23/22 1640     Updated: 05/23/22 1641    Narrative:      Right Knee Radiographs  Indication: status-post right total knee arthroplasty  Views: AP, lateral, and sunrise views of the right knee    Comparison: no change  compared to prior study, 5/5/2022    Findings:   The components are well aligned, with no signs of loosening or failure.          Assessment:  1. Status post total right knee replacement        Plan:  1. Status post right TKA, stable.  2. Glue mesh dressing was removed today.  3. Continue with formal outpatient PT.  4. Continue taking Eliquis as directed for DVT prophylaxis.  5. Follow up in 6 weeks with Dr. Marie.  No imaging required at that appointment.  6. Questions and concerns answered.      Sera Mendoza PA-C  05/23/22  20:03 EDT      Dictated Utilizing Dragon Dictation.

## 2022-06-24 DIAGNOSIS — I10 ESSENTIAL HYPERTENSION: ICD-10-CM

## 2022-06-24 RX ORDER — LISINOPRIL AND HYDROCHLOROTHIAZIDE 20; 12.5 MG/1; MG/1
TABLET ORAL
Qty: 90 TABLET | Refills: 0 | Status: SHIPPED | OUTPATIENT
Start: 2022-06-24 | End: 2022-09-19

## 2022-07-06 ENCOUNTER — OFFICE VISIT (OUTPATIENT)
Dept: ORTHOPEDIC SURGERY | Facility: CLINIC | Age: 67
End: 2022-07-06

## 2022-07-06 DIAGNOSIS — Z96.651 STATUS POST TOTAL RIGHT KNEE REPLACEMENT: Primary | ICD-10-CM

## 2022-07-06 DIAGNOSIS — Z47.89 ORTHOPEDIC AFTERCARE: ICD-10-CM

## 2022-07-06 PROCEDURE — 99024 POSTOP FOLLOW-UP VISIT: CPT | Performed by: ORTHOPAEDIC SURGERY

## 2022-07-06 NOTE — PROGRESS NOTES
[facility] [place] [facility] Note    Subjective     Chief Complaint   Patient presents with   • Post-op     6 week f/u; 9 weeks S/P TOTAL KNEE ARTHROPLASTY WITH CORI ROBOT RIGHT 5/5/22        HPI    It has been 6  week(s) since Ms. Mendoza's last visit. She returns to clinic today for postoperative follow-up of right knee arthroplasty. The issue has been ongoing for 9 week(s). She rates her pain a 2/10 on the pain scale. Previous/current treatments: physical therapy. Current symptoms: none. The pain is worse with climbing stairs and rising from seated position; ice improve the pain. Overall, she is doing better.  90% improvement compared to her preoperative symptoms.  Fully ambulatory without external aids.    I have reviewed the following portions of the patient's history and agree with: History of Present Illness and Review of Systems    Patient Active Problem List   Diagnosis   • HTN (hypertension)   • Depression   • Prediabetes   • Carpal tunnel syndrome of right wrist   • Annual physical exam   • Gastroesophageal reflux disease without esophagitis   • Need for diphtheria-tetanus-pertussis (Tdap) vaccine   • Post-menopausal   • Bilateral primary osteoarthritis of knee   • Chronic pain of both knees   • Postmenopausal   • Acquired hypothyroidism   • Antiphospholipid antibody positive   • Primary osteoarthritis of right knee   • ANDREIA on CPAP   • Obesity   • S/P total knee arthroplasty, right   • Elevated partial thromboplastin time (PTT)   • Acute blood loss anemia, mild, asymptomatic     Past Medical History:   Diagnosis Date   • Arthritis    • Depression    • Disease of thyroid gland    • Esophageal ring    • GERD (gastroesophageal reflux disease)    • H/O LEEP    • Hearing loss in left ear    • HTN (hypertension)    • Kidney stones    • ANDREIA (obstructive sleep apnea)     wears cpap, setting 6-12   • Prediabetes       Past Surgical History:   Procedure Laterality Date   • BREAST BIOPSY Right 2006   •  CHOLECYSTECTOMY  2014   • COLONOSCOPY  2011   • ESOPHAGEAL DILATATION  2009   • NEPHRECTOMY PARTIAL Right 2014   • TOTAL KNEE ARTHROPLASTY Right 5/5/2022    Procedure: TOTAL KNEE ARTHROPLASTY WITH CORI ROBOT;  Surgeon: Matt Marie MD;  Location: [facility];  Service: Robotics - Ortho;  Laterality: Right;   • UPPER GASTROINTESTINAL ENDOSCOPY  2011      Family History   Problem Relation Age of Onset   • Atrial fibrillation Mother    • Hypertension Mother    • Heart disease Father    • Diabetes Father    • Breast cancer Paternal Aunt    • Ovarian cancer Neg Hx      Social History     Socioeconomic History   • Marital status:    • Number of children: 4   Tobacco Use   • Smoking status: Never Smoker   • Smokeless tobacco: Never Used   Vaping Use   • Vaping Use: Never used   Substance and Sexual Activity   • Alcohol use: No   • Drug use: No   • Sexual activity: Yes     Partners: Male     Comment:  for 41 years      Current Outpatient Medications on File Prior to Visit   Medication Sig Dispense Refill   • apixaban (ELIQUIS) 2.5 MG tablet tablet Take 1 tablet by mouth Every 12 (Twelve) Hours. 60 tablet 0   • aspirin 81 MG EC tablet Take 1 tablet by mouth Daily.     • buPROPion XL (WELLBUTRIN XL) 300 MG 24 hr tablet Take 1 tablet by mouth Daily. 30 tablet 2   • chlorhexidine (Betasept Surgical Scrub) 4 % external liquid Apply  topically to the appropriate area as directed Daily. 237 mL 0   • levothyroxine (Synthroid) 50 MCG tablet Take 1 tablet by mouth Daily. 90 tablet 0   • lisinopril-hydrochlorothiazide (PRINZIDE,ZESTORETIC) 20-12.5 MG per tablet TAKE 1 TABLET BY MOUTH EVERY DAY 90 tablet 0   • VITAMIN D PO Take 1,000 Units by mouth Daily.     • [DISCONTINUED] oxyCODONE (Roxicodone) 5 MG immediate release tablet Take 1 tablet by mouth Every 4 (Four) Hours As Needed for Moderate Pain . 40 tablet 0   • [DISCONTINUED] Ropivacine HCl-NaCl (NAROPIN) 20 mg/hr by Peripheral Nerve route Continuous. Indications:  Acute Pain       No current facility-administered medications on file prior to visit.      No Known Allergies     Review of Systems   Constitutional: Negative.  Negative for activity change, appetite change, chills, diaphoresis, fatigue, fever and unexpected weight change.   HENT: Negative.  Negative for congestion, dental problem, drooling, ear discharge, ear pain, facial swelling, hearing loss, mouth sores, nosebleeds, postnasal drip, rhinorrhea, sinus pressure, sneezing, sore throat, tinnitus, trouble swallowing and voice change.    Eyes: Negative.  Negative for photophobia, pain, discharge, redness, itching and visual disturbance.   Respiratory: Positive for apnea. Negative for cough, choking, chest tightness, shortness of breath, wheezing and stridor.    Cardiovascular: Negative.  Negative for chest pain, palpitations and leg swelling.   Gastrointestinal: Negative.  Negative for abdominal distention, abdominal pain, anal bleeding, blood in stool, constipation, diarrhea, nausea, rectal pain and vomiting.   Endocrine: Negative.  Negative for cold intolerance, heat intolerance, polydipsia, polyphagia and polyuria.   Genitourinary: Negative.  Negative for decreased urine volume, difficulty urinating, dysuria, enuresis, flank pain, frequency, genital sores, hematuria and urgency.   Musculoskeletal: Positive for arthralgias. Negative for back pain, gait problem, joint swelling, myalgias, neck pain and neck stiffness.   Skin: Negative.  Negative for color change, pallor, rash and wound.   Allergic/Immunologic: Negative.  Negative for environmental allergies, food allergies and immunocompromised state.   Neurological: Negative.  Negative for dizziness, tremors, seizures, syncope, facial asymmetry, speech difficulty, weakness, light-headedness, numbness and headaches.   Hematological: Negative.  Negative for adenopathy. Does not bruise/bleed easily.   Psychiatric/Behavioral: Negative.  Negative for agitation, behavioral  problems, confusion, decreased concentration, dysphoric mood, hallucinations, self-injury, sleep disturbance and suicidal ideas. The patient is not nervous/anxious and is not hyperactive.         Objective      Physical Exam  There were no vitals taken for this visit.    There is no height or weight on file to calculate BMI.    General:   Mental Status:  Alert   Appearance: Cooperative, in no acute distress   Build and Nutrition: Overweight by BMI female   Orientation: Alert and oriented to person, place and time   Posture: Normal   Gait: Nonantalgic    Integument:   Right knee: Wound is well-healed with no signs of infection    Lower Extremities:   Right Knee:    Tenderness:  Mild medial joint line tenderness    Effusion:  None    Swelling: None    Crepitus:  None    Range of motion:  Extension: 0°       Flexion: 125°  Instability:  No varus laxity, no valgus laxity, negative anterior drawer  Deformities:  None      Imaging/Studies  Imaging Results (Last 24 Hours)     ** No results found for the last 24 hours. **        No new imaging today.      Assessment and Plan     Diagnoses and all orders for this visit:    1. Status post total right knee replacement (Primary)    2. Orthopedic aftercare        1. Status post total right knee replacement    2. Orthopedic aftercare        I reviewed my findings with the patient.  Her right total knee arthroplasty is functioning well and she is pleased with results.  I will see her back in 10 months, which will be a 1 year checkup with x-rays.  I will see her back sooner for any problems.    Return in about 10 months (around 5/6/2023) for Recheck with X-Rays.      Matt Marie MD  07/06/22  08:43 EDT

## 2022-08-16 DIAGNOSIS — E03.9 HYPOTHYROIDISM, UNSPECIFIED TYPE: ICD-10-CM

## 2022-08-16 RX ORDER — LEVOTHYROXINE SODIUM 0.05 MG/1
TABLET ORAL
Qty: 90 TABLET | Refills: 0 | Status: SHIPPED | OUTPATIENT
Start: 2022-08-16 | End: 2022-12-28

## 2022-08-16 NOTE — TELEPHONE ENCOUNTER
Rx Refill Note  Requested Prescriptions     Pending Prescriptions Disp Refills   • levothyroxine (SYNTHROID, LEVOTHROID) 50 MCG tablet [Pharmacy Med Name: LEVOTHYROXINE 50 MCG TABLET] 90 tablet 0     Sig: TAKE 1 TABLET BY MOUTH EVERY DAY      Last office visit with prescribing clinician: 4/19/2022      Next office visit with prescribing clinician: Visit date not found            Shawn Ibarra MA  08/16/22, 08:49 EDT

## 2022-09-19 DIAGNOSIS — I10 ESSENTIAL HYPERTENSION: ICD-10-CM

## 2022-09-19 RX ORDER — LISINOPRIL AND HYDROCHLOROTHIAZIDE 20; 12.5 MG/1; MG/1
TABLET ORAL
Qty: 90 TABLET | Refills: 0 | Status: SHIPPED | OUTPATIENT
Start: 2022-09-19 | End: 2022-12-21

## 2022-09-21 RX ORDER — ASPIRIN 81 MG/1
81 TABLET ORAL DAILY
Start: 2022-09-21

## 2022-09-21 NOTE — TELEPHONE ENCOUNTER
Rx Refill Note  Requested Prescriptions     Pending Prescriptions Disp Refills   • aspirin 81 MG EC tablet       Sig: Take 1 tablet by mouth Daily.      Last office visit with prescribing clinician: 4/19/2022      Next office visit with prescribing clinician: Visit date not found            Shawn Iabrra MA  09/21/22, 15:12 EDT

## 2022-09-23 ENCOUNTER — TELEPHONE (OUTPATIENT)
Dept: FAMILY MEDICINE CLINIC | Facility: CLINIC | Age: 67
End: 2022-09-23

## 2022-09-23 ENCOUNTER — TRANSCRIBE ORDERS (OUTPATIENT)
Dept: ADMINISTRATIVE | Facility: HOSPITAL | Age: 67
End: 2022-09-23

## 2022-09-23 DIAGNOSIS — Z12.31 VISIT FOR SCREENING MAMMOGRAM: Primary | ICD-10-CM

## 2022-09-23 NOTE — TELEPHONE ENCOUNTER
Caller: Darrion Mendoza    Relationship: Self    Best call back number: [phone]    What orders are you requesting (i.e. lab or imaging): DIAGNOSTIC MAMMOGRAM     In what timeframe would the patient need to come in: ASAP    Where will you receive your lab/imaging services: [facility]    Additional notes: DUE TO TENDERNESS IN RIGHT BREAST

## 2022-09-27 DIAGNOSIS — N64.4 PAIN IN BREAST: Primary | ICD-10-CM

## 2022-11-01 ENCOUNTER — HOSPITAL ENCOUNTER (OUTPATIENT)
Dept: MAMMOGRAPHY | Facility: HOSPITAL | Age: 67
Discharge: HOME | End: 2022-11-01
Admitting: FAMILY MEDICINE

## 2022-11-01 DIAGNOSIS — N64.4 PAIN IN BREAST: ICD-10-CM

## 2022-11-01 PROCEDURE — 77066 DX MAMMO INCL CAD BI: CPT

## 2022-11-01 PROCEDURE — G0279 TOMOSYNTHESIS, MAMMO: HCPCS | Performed by: RADIOLOGY

## 2022-11-01 PROCEDURE — G0279 TOMOSYNTHESIS, MAMMO: HCPCS

## 2022-11-01 PROCEDURE — 77066 DX MAMMO INCL CAD BI: CPT | Performed by: RADIOLOGY

## 2022-12-20 DIAGNOSIS — I10 ESSENTIAL HYPERTENSION: ICD-10-CM

## 2022-12-21 RX ORDER — LISINOPRIL AND HYDROCHLOROTHIAZIDE 20; 12.5 MG/1; MG/1
TABLET ORAL
Qty: 90 TABLET | Refills: 0 | Status: SHIPPED | OUTPATIENT
Start: 2022-12-21 | End: 2023-03-30

## 2022-12-27 DIAGNOSIS — E03.9 HYPOTHYROIDISM, UNSPECIFIED TYPE: ICD-10-CM

## 2022-12-28 RX ORDER — LEVOTHYROXINE SODIUM 0.05 MG/1
TABLET ORAL
Qty: 90 TABLET | Refills: 0 | Status: SHIPPED | OUTPATIENT
Start: 2022-12-28 | End: 2023-03-20

## 2022-12-28 NOTE — TELEPHONE ENCOUNTER
Rx Refill Note  Requested Prescriptions     Pending Prescriptions Disp Refills   • levothyroxine (SYNTHROID, LEVOTHROID) 50 MCG tablet [Pharmacy Med Name: LEVOTHYROXINE 50 MCG TABLET] 90 tablet 0     Sig: TAKE 1 TABLET BY MOUTH EVERY DAY      Last office visit with prescribing clinician: 4/19/2022   Last telemedicine visit with prescribing clinician: Visit date not found   Next office visit with prescribing clinician: Visit date not found                         Would you like a call back once the refill request has been completed: [] Yes [] No    If the office needs to give you a call back, can they leave a voicemail: [] Yes [] No    Lashae Gutierrez MA  12/28/22, 08:17 EST

## 2023-02-27 ENCOUNTER — TELEPHONE (OUTPATIENT)
Dept: FAMILY MEDICINE CLINIC | Facility: CLINIC | Age: 68
End: 2023-02-27
Payer: MEDICARE

## 2023-03-06 ENCOUNTER — OFFICE VISIT (OUTPATIENT)
Dept: FAMILY MEDICINE CLINIC | Facility: CLINIC | Age: 68
End: 2023-03-06
Payer: MEDICARE

## 2023-03-06 VITALS
HEIGHT: 64 IN | TEMPERATURE: 98.4 F | OXYGEN SATURATION: 96 % | HEART RATE: 82 BPM | DIASTOLIC BLOOD PRESSURE: 78 MMHG | BODY MASS INDEX: 32.27 KG/M2 | WEIGHT: 189 LBS | SYSTOLIC BLOOD PRESSURE: 120 MMHG

## 2023-03-06 DIAGNOSIS — Z00.00 MEDICARE ANNUAL WELLNESS VISIT, SUBSEQUENT: Primary | ICD-10-CM

## 2023-03-06 DIAGNOSIS — E03.9 HYPOTHYROIDISM, UNSPECIFIED TYPE: ICD-10-CM

## 2023-03-06 DIAGNOSIS — I10 ESSENTIAL HYPERTENSION: ICD-10-CM

## 2023-03-06 DIAGNOSIS — R73.03 PREDIABETES: ICD-10-CM

## 2023-03-06 DIAGNOSIS — E28.39 PRIMARY OVARIAN FAILURE: ICD-10-CM

## 2023-03-06 DIAGNOSIS — G47.33 OSA (OBSTRUCTIVE SLEEP APNEA): ICD-10-CM

## 2023-03-06 DIAGNOSIS — Z23 IMMUNIZATION DUE: ICD-10-CM

## 2023-03-06 PROCEDURE — 1159F MED LIST DOCD IN RCRD: CPT | Performed by: PHYSICIAN ASSISTANT

## 2023-03-06 PROCEDURE — 1160F RVW MEDS BY RX/DR IN RCRD: CPT | Performed by: PHYSICIAN ASSISTANT

## 2023-03-06 PROCEDURE — 1126F AMNT PAIN NOTED NONE PRSNT: CPT | Performed by: PHYSICIAN ASSISTANT

## 2023-03-06 PROCEDURE — G0439 PPPS, SUBSEQ VISIT: HCPCS | Performed by: PHYSICIAN ASSISTANT

## 2023-03-06 PROCEDURE — 1125F AMNT PAIN NOTED PAIN PRSNT: CPT | Performed by: PHYSICIAN ASSISTANT

## 2023-03-06 PROCEDURE — 1170F FXNL STATUS ASSESSED: CPT | Performed by: PHYSICIAN ASSISTANT

## 2023-03-06 PROCEDURE — G0008 ADMIN INFLUENZA VIRUS VAC: HCPCS | Performed by: PHYSICIAN ASSISTANT

## 2023-03-06 PROCEDURE — 90662 IIV NO PRSV INCREASED AG IM: CPT | Performed by: PHYSICIAN ASSISTANT

## 2023-03-06 PROCEDURE — 90677 PCV20 VACCINE IM: CPT | Performed by: PHYSICIAN ASSISTANT

## 2023-03-06 RX ORDER — BUPROPION HYDROCHLORIDE 150 MG/1
TABLET ORAL
COMMUNITY
Start: 2023-01-04

## 2023-03-06 NOTE — PROGRESS NOTES
The ABCs of the Annual Wellness Visit  Subsequent Medicare Wellness Visit    Subjective      Darrion Mendoza is a 67 y.o. female who presents for a Subsequent Medicare Wellness Visit.  She has been doing well.  No new concerns.    The following portions of the patient's history were reviewed and   updated as appropriate: allergies, current medications, past family history, past medical history, past social history, past surgical history and problem list.    Compared to one year ago, the patient feels her physical   health is the same.    Compared to one year ago, the patient feels her mental   health is the same.    Recent Hospitalizations:  She was not admitted to the hospital during the last year.       Current Medical Providers:  Patient Care Team:  Indigo Paul MD as PCP - General (Family Medicine)  Matt Marie MD as Consulting Physician (Orthopedic Surgery)    Outpatient Medications Prior to Visit   Medication Sig Dispense Refill   • aspirin 81 MG EC tablet Take 1 tablet by mouth Daily.     • buPROPion XL (WELLBUTRIN XL) 150 MG 24 hr tablet      • buPROPion XL (WELLBUTRIN XL) 300 MG 24 hr tablet Take 1 tablet by mouth Daily. 30 tablet 2   • levothyroxine (SYNTHROID, LEVOTHROID) 50 MCG tablet TAKE 1 TABLET BY MOUTH EVERY DAY (Patient taking differently: 25 mcg Daily.) 90 tablet 0   • lisinopril-hydrochlorothiazide (PRINZIDE,ZESTORETIC) 20-12.5 MG per tablet TAKE 1 TABLET BY MOUTH EVERY DAY 90 tablet 0   • VITAMIN D PO Take 1,000 Units by mouth Daily.     • apixaban (ELIQUIS) 2.5 MG tablet tablet Take 1 tablet by mouth Every 12 (Twelve) Hours. 60 tablet 0   • chlorhexidine (Betasept Surgical Scrub) 4 % external liquid Apply  topically to the appropriate area as directed Daily. 237 mL 0     No facility-administered medications prior to visit.       No opioid medication identified on active medication list. I have reviewed chart for other potential  high risk medication/s and harmful drug interactions  in the elderly.          Aspirin is on active medication list. Aspirin use is indicated based on review of current medical condition/s. Pros and cons of this therapy have been discussed today. Benefits of this medication outweigh potential harm.  Patient has been encouraged to continue taking this medication.  .    Physical Exam  Vitals and nursing note reviewed.   Constitutional:       General: She is not in acute distress.     Appearance: Normal appearance. She is well-developed.   HENT:      Head: Normocephalic and atraumatic.      Right Ear: Tympanic membrane and ear canal normal. There is no impacted cerumen.      Left Ear: Tympanic membrane and ear canal normal. There is no impacted cerumen.      Nose: Nose normal. No congestion or rhinorrhea.      Mouth/Throat:      Mouth: Mucous membranes are moist.      Pharynx: Oropharynx is clear. No oropharyngeal exudate or posterior oropharyngeal erythema.   Eyes:      General: No scleral icterus.        Right eye: No discharge.         Left eye: No discharge.      Extraocular Movements: Extraocular movements intact.      Conjunctiva/sclera: Conjunctivae normal.      Pupils: Pupils are equal, round, and reactive to light.   Neck:      Thyroid: No thyromegaly.      Vascular: No carotid bruit.   Cardiovascular:      Rate and Rhythm: Normal rate and regular rhythm.      Heart sounds: Normal heart sounds. No murmur heard.  Pulmonary:      Breath sounds: Normal breath sounds. No wheezing, rhonchi or rales.   Abdominal:      General: Bowel sounds are normal. There is no distension.      Palpations: Abdomen is soft. There is no mass.      Tenderness: There is no abdominal tenderness.   Musculoskeletal:         General: No swelling. Normal range of motion.      Cervical back: Normal range of motion and neck supple.      Right lower leg: No edema.      Left lower leg: No edema.   Lymphadenopathy:      Cervical: No cervical adenopathy.   Skin:     General: Skin is warm.       "Coloration: Skin is not jaundiced or pale.      Findings: No bruising or rash.   Neurological:      General: No focal deficit present.      Mental Status: She is alert.      Cranial Nerves: No cranial nerve deficit.      Motor: No weakness.      Gait: Gait normal.   Psychiatric:         Mood and Affect: Mood normal.         Behavior: Behavior normal.         Judgment: Judgment normal.       Patient Active Problem List   Diagnosis   • HTN (hypertension)   • Depression   • Prediabetes   • Carpal tunnel syndrome of right wrist   • Annual physical exam   • Gastroesophageal reflux disease without esophagitis   • Need for diphtheria-tetanus-pertussis (Tdap) vaccine   • Post-menopausal   • Bilateral primary osteoarthritis of knee   • Chronic pain of both knees   • Postmenopausal   • Acquired hypothyroidism   • Antiphospholipid antibody positive   • Primary osteoarthritis of right knee   • ANDREIA on CPAP   • Obesity   • S/P total knee arthroplasty, right   • Elevated partial thromboplastin time (PTT)   • Acute blood loss anemia, mild, asymptomatic     Advance Care Planning  Advance Directive is on file.  ACP discussion was held with the patient during this visit. Patient has an advance directive in EMR which is still valid.      Objective    Vitals:    03/06/23 1536   BP: 120/78   Pulse: 82   Temp: 98.4 °F (36.9 °C)   TempSrc: Infrared   SpO2: 96%   Weight: 85.7 kg (189 lb)   Height: 162.6 cm (64\")   PainSc: 0-No pain     Estimated body mass index is 32.44 kg/m² as calculated from the following:    Height as of this encounter: 162.6 cm (64\").    Weight as of this encounter: 85.7 kg (189 lb).    BMI is >= 30 and <35. (Class 1 Obesity). The following options were offered after discussion;: weight loss educational material (shared in after visit summary)      Does the patient have evidence of cognitive impairment?   No            HEALTH RISK ASSESSMENT    Smoking Status:  Social History     Tobacco Use   Smoking Status Never   • " Passive exposure: Never   Smokeless Tobacco Never     Alcohol Consumption:  Social History     Substance and Sexual Activity   Alcohol Use No     Fall Risk Screen:    ANUPADI Fall Risk Assessment was completed, and patient is at LOW risk for falls.Assessment completed on:3/6/2023    Depression Screening:  PHQ-2/PHQ-9 Depression Screening 3/6/2023   Little Interest or Pleasure in Doing Things 0-->not at all   Feeling Down, Depressed or Hopeless 0-->not at all   PHQ-9: Brief Depression Severity Measure Score 0       Health Habits and Functional and Cognitive Screening:  Functional & Cognitive Status 3/6/2023   Do you have difficulty preparing food and eating? No   Do you have difficulty bathing yourself, getting dressed or grooming yourself? No   Do you have difficulty using the toilet? No   Do you have difficulty moving around from place to place? No   Do you have trouble with steps or getting out of a bed or a chair? No   Current Diet Frequent Junk Food   Dental Exam Up to date   Eye Exam Up to date   Exercise (times per week) 0 times per week   Current Exercises Include No Regular Exercise   Do you need help using the phone?  No   Are you deaf or do you have serious difficulty hearing?  No   Do you need help with transportation? No   Do you need help shopping? No   Do you need help preparing meals?  No   Do you need help with housework?  No   Do you need help with laundry? No   Do you need help taking your medications? No   Do you need help managing money? No   Do you ever drive or ride in a car without wearing a seat belt? No   Have you felt unusual stress, anger or loneliness in the last month? Yes   Who do you live with? Spouse   If you need help, do you have trouble finding someone available to you? No   Have you been bothered in the last four weeks by sexual problems? No   Do you have difficulty concentrating, remembering or making decisions? No       Age-appropriate Screening Schedule:  Refer to the list below  for future screening recommendations based on patient's age, sex and/or medical conditions. Orders for these recommended tests are listed in the plan section. The patient has been provided with a written plan.    Health Maintenance   Topic Date Due   • ANNUAL WELLNESS VISIT  Never done   • PAP SMEAR  11/03/2019   • DXA SCAN  08/11/2022   • MAMMOGRAM  11/01/2024   • TDAP/TD VACCINES (5 - Td or Tdap) 11/22/2030   • COLORECTAL CANCER SCREENING  04/01/2032   • HEPATITIS C SCREENING  Completed   • COVID-19 Vaccine  Completed   • INFLUENZA VACCINE  Completed   • Pneumococcal Vaccine 65+  Completed   • ZOSTER VACCINE  Addressed                CMS Preventative Services Quick Reference  Risk Factors Identified During Encounter:    Hearing Problem: Wears hearing aid  Immunizations Discussed/Encouraged: Influenza and Prevnar 20 (Pneumococcal 20-valent conjugate)    The above risks/problems have been discussed with the patient.  Pertinent information has been shared with the patient in the After Visit Summary.    Diagnoses and all orders for this visit:    1. Medicare annual wellness visit, subsequent (Primary)    2. Primary ovarian failure  -     DEXA Bone Density Axial; Future    3. ANDREIA (obstructive sleep apnea)  -     Ambulatory Referral to Sleep Medicine    4. Hypothyroidism, unspecified type  -     T4, free; Future  -     TSH; Future    5. Essential hypertension  -     CBC No Differential; Future  -     Comprehensive metabolic panel; Future  -     Lipid panel; Future    6. Immunization due  -     Pneumococcal Conjugate Vaccine 20-Valent (PCV20)  -     Fluzone High-Dose 65+yrs ([id number])    7. Prediabetes  -     Hemoglobin A1c; Future    Labs today.  Update immunizations.  Her sleep medicine provider for her ANDREIA has left so she does need to get established with a new one.  Referral placed.  Order for DEXA placed.    Follow Up:   Next Medicare Wellness visit to be scheduled in 1 year.      An After Visit Summary and PPPS  were made available to the patient.

## 2023-03-10 ENCOUNTER — OFFICE VISIT (OUTPATIENT)
Dept: FAMILY MEDICINE CLINIC | Facility: CLINIC | Age: 68
End: 2023-03-10
Payer: MEDICARE

## 2023-03-10 ENCOUNTER — LAB (OUTPATIENT)
Dept: LAB | Facility: HOSPITAL | Age: 68
End: 2023-03-10
Payer: MEDICARE

## 2023-03-10 VITALS
HEIGHT: 64 IN | TEMPERATURE: 97.2 F | WEIGHT: 187.2 LBS | BODY MASS INDEX: 31.96 KG/M2 | SYSTOLIC BLOOD PRESSURE: 118 MMHG | DIASTOLIC BLOOD PRESSURE: 72 MMHG | HEART RATE: 76 BPM | OXYGEN SATURATION: 96 %

## 2023-03-10 DIAGNOSIS — E03.9 HYPOTHYROIDISM, UNSPECIFIED TYPE: ICD-10-CM

## 2023-03-10 DIAGNOSIS — Z12.4 SCREENING FOR CERVICAL CANCER: Primary | ICD-10-CM

## 2023-03-10 DIAGNOSIS — R73.03 PREDIABETES: ICD-10-CM

## 2023-03-10 DIAGNOSIS — I10 ESSENTIAL HYPERTENSION: ICD-10-CM

## 2023-03-10 LAB
ALBUMIN SERPL-MCNC: 4.5 G/DL (ref 3.5–5.2)
ALBUMIN/GLOB SERPL: 2 G/DL
ALP SERPL-CCNC: 78 U/L (ref 39–117)
ALT SERPL W P-5'-P-CCNC: 18 U/L (ref 1–33)
ANION GAP SERPL CALCULATED.3IONS-SCNC: 11.7 MMOL/L (ref 5–15)
AST SERPL-CCNC: 18 U/L (ref 1–32)
BILIRUB SERPL-MCNC: 0.3 MG/DL (ref 0–1.2)
BUN SERPL-MCNC: 20 MG/DL (ref 8–23)
BUN/CREAT SERPL: 17.2 (ref 7–25)
CALCIUM SPEC-SCNC: 9.8 MG/DL (ref 8.6–10.5)
CHLORIDE SERPL-SCNC: 105 MMOL/L (ref 98–107)
CHOLEST SERPL-MCNC: 188 MG/DL (ref 0–200)
CO2 SERPL-SCNC: 24.3 MMOL/L (ref 22–29)
CREAT SERPL-MCNC: 1.16 MG/DL (ref 0.57–1)
DEPRECATED RDW RBC AUTO: 41 FL (ref 37–54)
EGFRCR SERPLBLD CKD-EPI 2021: 51.8 ML/MIN/1.73
ERYTHROCYTE [DISTWIDTH] IN BLOOD BY AUTOMATED COUNT: 12.9 % (ref 12.3–15.4)
GLOBULIN UR ELPH-MCNC: 2.3 GM/DL
GLUCOSE SERPL-MCNC: 98 MG/DL (ref 65–99)
HBA1C MFR BLD: 6.3 % (ref 4.8–5.6)
HCT VFR BLD AUTO: 37.7 % (ref 34–46.6)
HDLC SERPL-MCNC: 39 MG/DL (ref 40–60)
HGB BLD-MCNC: 12.9 G/DL (ref 12–15.9)
LDLC SERPL CALC-MCNC: 121 MG/DL (ref 0–100)
LDLC/HDLC SERPL: 3.02 {RATIO}
MCH RBC QN AUTO: 30 PG (ref 26.6–33)
MCHC RBC AUTO-ENTMCNC: 34.2 G/DL (ref 31.5–35.7)
MCV RBC AUTO: 87.7 FL (ref 79–97)
PLATELET # BLD AUTO: 190 10*3/MM3 (ref 140–450)
PMV BLD AUTO: 11 FL (ref 6–12)
POTASSIUM SERPL-SCNC: 4 MMOL/L (ref 3.5–5.2)
PROT SERPL-MCNC: 6.8 G/DL (ref 6–8.5)
RBC # BLD AUTO: 4.3 10*6/MM3 (ref 3.77–5.28)
SODIUM SERPL-SCNC: 141 MMOL/L (ref 136–145)
T4 FREE SERPL-MCNC: 1.09 NG/DL (ref 0.93–1.7)
TRIGL SERPL-MCNC: 156 MG/DL (ref 0–150)
TSH SERPL DL<=0.05 MIU/L-ACNC: 4.83 UIU/ML (ref 0.27–4.2)
VLDLC SERPL-MCNC: 28 MG/DL (ref 5–40)
WBC NRBC COR # BLD: 7.24 10*3/MM3 (ref 3.4–10.8)

## 2023-03-10 PROCEDURE — 85027 COMPLETE CBC AUTOMATED: CPT

## 2023-03-10 PROCEDURE — 84439 ASSAY OF FREE THYROXINE: CPT

## 2023-03-10 PROCEDURE — 80061 LIPID PANEL: CPT

## 2023-03-10 PROCEDURE — 99213 OFFICE O/P EST LOW 20 MIN: CPT | Performed by: PHYSICIAN ASSISTANT

## 2023-03-10 PROCEDURE — 80053 COMPREHEN METABOLIC PANEL: CPT

## 2023-03-10 PROCEDURE — 84443 ASSAY THYROID STIM HORMONE: CPT

## 2023-03-10 PROCEDURE — 83036 HEMOGLOBIN GLYCOSYLATED A1C: CPT

## 2023-03-10 PROCEDURE — 36415 COLL VENOUS BLD VENIPUNCTURE: CPT

## 2023-03-10 NOTE — PROGRESS NOTES
"     Follow Up Office Visit      Date: 03/10/2023   Patient Name: Darrion Mendoza  : 1955   MRN: [id number]     Chief Complaint:    Chief Complaint   Patient presents with   • vaginal exam      pap       History of Present Illness: Darrion Mendoza is a 67 y.o. female who is here today for pap smear. She had a general physical a few days ago.     Subjective      Review of systems:  Review of Systems   Constitutional: Negative for fatigue and fever.   Genitourinary: Negative for pelvic pain, pelvic pressure, vaginal bleeding, vaginal discharge and vaginal pain.        I have reviewed and the following portions of the patient's history were updated as appropriate: past family history, past medical history, past social history, past surgical history and problem list.    Medications:     Current Outpatient Medications:   •  aspirin 81 MG EC tablet, Take 1 tablet by mouth Daily., Disp: , Rfl:   •  buPROPion XL (WELLBUTRIN XL) 150 MG 24 hr tablet, , Disp: , Rfl:   •  buPROPion XL (WELLBUTRIN XL) 300 MG 24 hr tablet, Take 1 tablet by mouth Daily., Disp: 30 tablet, Rfl: 2  •  levothyroxine (SYNTHROID, LEVOTHROID) 50 MCG tablet, TAKE 1 TABLET BY MOUTH EVERY DAY (Patient taking differently: 25 mcg Daily.), Disp: 90 tablet, Rfl: 0  •  lisinopril-hydrochlorothiazide (PRINZIDE,ZESTORETIC) 20-12.5 MG per tablet, TAKE 1 TABLET BY MOUTH EVERY DAY, Disp: 90 tablet, Rfl: 0  •  VITAMIN D PO, Take 1,000 Units by mouth Daily., Disp: , Rfl:     Allergies:   No Known Allergies    Objective     Vital Signs:   Vitals:    03/10/23 0740   BP: 118/72   Pulse: 76   Temp: 97.2 °F (36.2 °C)   SpO2: 96%   Weight: 84.9 kg (187 lb 3.2 oz)   Height: 162.6 cm (64\")   PainSc: 0-No pain     Body mass index is 32.13 kg/m².   BMI is >= 30 and <35. (Class 1 Obesity). The following options were offered after discussion;: weight loss educational material (shared in after visit summary)      Physical Exam:   Physical Exam  Vitals and nursing " note reviewed. Exam conducted with a chaperone present.   Constitutional:       Appearance: Normal appearance.   Genitourinary:     General: Normal vulva.      Exam position: Lithotomy position.      Labia:         Left: No lesion.       Vagina: Normal.      Cervix: Normal.      Uterus: Normal.       Adnexa: Right adnexa normal.   Neurological:      Mental Status: She is alert.          Assessment / Plan      Assessment/Plan:   Diagnoses and all orders for this visit:    1. Screening for cervical cancer (Primary)  -     LIQUID-BASED PAP SMEAR, [facility] ([facility]); Future         Follow Up:   No follow-ups on file.    Ernestine Segundo PA-C   [facility] [place]

## 2023-03-14 LAB — REF LAB TEST METHOD: NORMAL

## 2023-03-16 ENCOUNTER — TELEPHONE (OUTPATIENT)
Dept: FAMILY MEDICINE CLINIC | Facility: CLINIC | Age: 68
End: 2023-03-16
Payer: MEDICARE

## 2023-03-16 NOTE — TELEPHONE ENCOUNTER
Called pt to follow up on Dexa scan , pt denies this because she states she had one done 3 years ago and wants to cancel this order .

## 2023-03-19 DIAGNOSIS — E03.9 HYPOTHYROIDISM, UNSPECIFIED TYPE: ICD-10-CM

## 2023-03-20 RX ORDER — LEVOTHYROXINE SODIUM 0.05 MG/1
TABLET ORAL
Qty: 90 TABLET | Refills: 3 | Status: SHIPPED | OUTPATIENT
Start: 2023-03-20

## 2023-03-20 NOTE — TELEPHONE ENCOUNTER
Rx Refill Note  Requested Prescriptions     Pending Prescriptions Disp Refills   • levothyroxine (SYNTHROID, LEVOTHROID) 50 MCG tablet [Pharmacy Med Name: LEVOTHYROXINE 50 MCG TABLET] 90 tablet 0     Sig: TAKE 1 TABLET BY MOUTH EVERY DAY      Last office visit with prescribing clinician: 4/19/2022   Last telemedicine visit with prescribing clinician: Visit date not found   Next office visit with prescribing clinician: 3/13/2024                         Would you like a call back once the refill request has been completed: [] Yes [] No    If the office needs to give you a call back, can they leave a voicemail: [] Yes [] No    Serafin Scott MA  03/20/23, 08:02 EDT

## 2023-03-30 DIAGNOSIS — I10 ESSENTIAL HYPERTENSION: ICD-10-CM

## 2023-03-30 RX ORDER — LISINOPRIL AND HYDROCHLOROTHIAZIDE 20; 12.5 MG/1; MG/1
TABLET ORAL
Qty: 90 TABLET | Refills: 0 | Status: SHIPPED | OUTPATIENT
Start: 2023-03-30

## 2023-05-08 ENCOUNTER — OFFICE VISIT (OUTPATIENT)
Dept: ORTHOPEDIC SURGERY | Facility: CLINIC | Age: 68
End: 2023-05-08
Payer: MEDICARE

## 2023-05-08 VITALS
DIASTOLIC BLOOD PRESSURE: 70 MMHG | WEIGHT: 187 LBS | SYSTOLIC BLOOD PRESSURE: 110 MMHG | BODY MASS INDEX: 31.92 KG/M2 | HEIGHT: 64 IN

## 2023-05-08 DIAGNOSIS — Z09 POSTOPERATIVE EXAMINATION: ICD-10-CM

## 2023-05-08 DIAGNOSIS — Z96.651 STATUS POST TOTAL RIGHT KNEE REPLACEMENT: Primary | ICD-10-CM

## 2023-05-08 ASSESSMENT — KOOS JR
KOOS JR SCORE: 91.975
KOOS JR SCORE: 1

## 2023-05-08 NOTE — PROGRESS NOTES
Procedure   Large Joint Arthrocentesis: L knee  Date/Time: 5/8/2023 8:11 AM  Consent given by: patient  Site marked: site marked  Timeout: Immediately prior to procedure a time out was called to verify the correct patient, procedure, equipment, support staff and site/side marked as required   Supporting Documentation  Indications: pain   Procedure Details  Location: knee - L knee  Preparation: Patient was prepped and draped in the usual sterile fashion  Needle gauge: 21g.  Approach: anterolateral  Medications administered: 4 mL ropivacaine 0.5 %; 40 mg triamcinolone acetonide 40 MG/ML  Patient tolerance: patient tolerated the procedure well with no immediate complications

## 2023-05-08 NOTE — PROGRESS NOTES
[facility] [place] [facility] [place]    Subjective     Chief Complaint   Patient presents with   • Follow-up     10 month recheck - S/P TOTAL KNEE ARTHROPLASTY WITH CORI ROBOT RIGHT 5/5/22        HPI    It has been 10  month(s) since Ms. Mendoza's last visit. She returns to clinic today for follow-up of right knee arthroplasty. The issue has been ongoing for 1 year(s). She rates her pain a 1/10 on the pain scale. Previous/current treatments: physical therapy. Current symptoms: stiffness. The pain is worse with climbing stairs;Overall, she is doing better.  97 to 100% improvement compared to preoperative symptoms.    I have reviewed the following portions of the patient's history and agree with: History of Present Illness and Review of Systems    Patient Active Problem List   Diagnosis   • HTN (hypertension)   • Depression   • Prediabetes   • Carpal tunnel syndrome of right wrist   • Annual physical exam   • Gastroesophageal reflux disease without esophagitis   • Need for diphtheria-tetanus-pertussis (Tdap) vaccine   • Post-menopausal   • Bilateral primary osteoarthritis of knee   • Chronic pain of both knees   • Postmenopausal   • Acquired hypothyroidism   • Antiphospholipid antibody positive   • Primary osteoarthritis of right knee   • ANDREIA on CPAP   • Obesity   • S/P total knee arthroplasty, right   • Elevated partial thromboplastin time (PTT)   • Acute blood loss anemia, mild, asymptomatic     Past Medical History:   Diagnosis Date   • Arthritis    • Depression    • Disease of thyroid gland    • Esophageal ring    • GERD (gastroesophageal reflux disease)    • H/O LEEP    • Hearing loss in left ear    • HTN (hypertension)    • Kidney stones    • ANDREIA (obstructive sleep apnea)     wears cpap, setting 6-12   • Prediabetes       Past Surgical History:   Procedure Laterality Date   • BREAST BIOPSY Right 2006   • CHOLECYSTECTOMY  2014   • COLONOSCOPY  2011   • ESOPHAGEAL DILATATION  2009   • NEPHRECTOMY PARTIAL Right  2014   • TOTAL KNEE ARTHROPLASTY Right 5/5/2022    Procedure: TOTAL KNEE ARTHROPLASTY WITH CORI ROBOT;  Surgeon: Matt Marie MD;  Location: [facility];  Service: Robotics - Ortho;  Laterality: Right;   • UPPER GASTROINTESTINAL ENDOSCOPY  2011      Family History   Problem Relation Age of Onset   • Atrial fibrillation Mother    • Hypertension Mother    • Heart disease Father    • Diabetes Father    • Breast cancer Paternal Aunt    • Ovarian cancer Neg Hx      Social History     Socioeconomic History   • Marital status:    • Number of children: 4   Tobacco Use   • Smoking status: Never     Passive exposure: Never   • Smokeless tobacco: Never   Vaping Use   • Vaping Use: Never used   Substance and Sexual Activity   • Alcohol use: No   • Drug use: No   • Sexual activity: Yes     Partners: Male     Comment:  for 41 years      Current Outpatient Medications on File Prior to Visit   Medication Sig Dispense Refill   • aspirin 81 MG EC tablet Take 1 tablet by mouth Daily.     • buPROPion XL (WELLBUTRIN XL) 150 MG 24 hr tablet      • buPROPion XL (WELLBUTRIN XL) 300 MG 24 hr tablet Take 1 tablet by mouth Daily. 30 tablet 2   • levothyroxine (SYNTHROID, LEVOTHROID) 50 MCG tablet TAKE 1 TABLET BY MOUTH EVERY DAY 90 tablet 3   • lisinopril-hydrochlorothiazide (PRINZIDE,ZESTORETIC) 20-12.5 MG per tablet TAKE 1 TABLET BY MOUTH EVERY DAY 90 tablet 0   • VITAMIN D PO Take 1,000 Units by mouth Daily.       No current facility-administered medications on file prior to visit.      No Known Allergies     Review of Systems   Constitutional: Negative for activity change, appetite change, chills, diaphoresis, fatigue, fever and unexpected weight change.   HENT: Negative for congestion, dental problem, drooling, ear discharge, ear pain, facial swelling, hearing loss, mouth sores, nosebleeds, postnasal drip, rhinorrhea, sinus pressure, sneezing, sore throat, tinnitus, trouble swallowing and voice change.    Eyes: Negative  "for photophobia, pain, discharge, redness, itching and visual disturbance.   Respiratory: Negative for apnea, cough, choking, chest tightness, shortness of breath, wheezing and stridor.    Cardiovascular: Negative for chest pain, palpitations and leg swelling.   Gastrointestinal: Negative for abdominal distention, abdominal pain, anal bleeding, blood in stool, constipation, diarrhea, nausea, rectal pain and vomiting.   Endocrine: Negative for cold intolerance, heat intolerance, polydipsia, polyphagia and polyuria.   Genitourinary: Negative for decreased urine volume, difficulty urinating, dysuria, enuresis, flank pain, frequency, genital sores, hematuria and urgency.   Musculoskeletal: Positive for arthralgias. Negative for back pain, gait problem, joint swelling, myalgias, neck pain and neck stiffness.   Skin: Negative for color change, pallor, rash and wound.   Allergic/Immunologic: Negative for environmental allergies, food allergies and immunocompromised state.   Neurological: Negative for dizziness, tremors, seizures, syncope, facial asymmetry, speech difficulty, weakness, light-headedness, numbness and headaches.   Hematological: Negative for adenopathy. Does not bruise/bleed easily.   Psychiatric/Behavioral: Negative for agitation, behavioral problems, confusion, decreased concentration, dysphoric mood, hallucinations, self-injury, sleep disturbance and suicidal ideas. The patient is not nervous/anxious and is not hyperactive.         Objective      Physical Exam  /70   Ht 162.6 cm (64\")   Wt 84.8 kg (187 lb)   BMI 32.10 kg/m²     Body mass index is 32.1 kg/m².    General:   Mental Status:  Alert   Appearance: Cooperative, in no acute distress   Build and Nutrition: Well-nourished well-developed female   Orientation: Alert and oriented to person, place and time   Posture: Normal   Gait: Nonantalgic    Integument:              Right knee: Wound is well-healed with no signs of infection     Lower " Extremities:              Right Knee:                          Tenderness:    None                          Effusion:          None                          Swelling:          None                          Crepitus:          None                          Range of motion:        Extension:       0°                                                              Flexion:           125°  Instability:        No varus laxity, no valgus laxity, negative anterior drawer  Deformities:     None    Imaging/Studies  Imaging Results (Last 24 Hours)     Procedure Component Value Units Date/Time    XR Knee 3+ View With McLoud Right [[id number]] Resulted: 05/08/23 0825     Updated: 05/08/23 0825    Narrative:      Right Knee Radiographs  Indication: status-post right total knee arthroplasty  Views: AP, lateral, and sunrise views of the right knee    Comparison: no change compared to prior study, 5/23/2022    Findings:   The components are well aligned, with no signs of loosening or failure.            Assessment and Plan     Diagnoses and all orders for this visit:    1. Status post total right knee replacement (Primary)  -     XR Knee 3+ View With McLoud Right    2. Postoperative examination        1. Status post total right knee replacement    2. Postoperative examination        I reviewed my findings with the patient.  Her right total knee arthroplasty is functioning well, and she is pleased with results.  I will see her back in 4 years, but sooner for any problems.    Return in about 4 years (around 5/8/2027) for Recheck with X-Rays.      Matt Marie MD  05/08/23  08:34 EDT

## 2023-06-19 ENCOUNTER — TELEPHONE (OUTPATIENT)
Dept: SLEEP MEDICINE | Facility: HOSPITAL | Age: 68
End: 2023-06-19
Payer: MEDICARE

## 2023-06-19 ENCOUNTER — OFFICE VISIT (OUTPATIENT)
Dept: SLEEP MEDICINE | Facility: HOSPITAL | Age: 68
End: 2023-06-19
Payer: MEDICARE

## 2023-06-19 VITALS
HEART RATE: 74 BPM | BODY MASS INDEX: 32.44 KG/M2 | WEIGHT: 190 LBS | OXYGEN SATURATION: 96 % | DIASTOLIC BLOOD PRESSURE: 86 MMHG | SYSTOLIC BLOOD PRESSURE: 165 MMHG | HEIGHT: 64 IN

## 2023-06-19 DIAGNOSIS — R03.0 ELEVATED BLOOD PRESSURE READING: ICD-10-CM

## 2023-06-19 DIAGNOSIS — G47.33 OSA ON CPAP: Primary | ICD-10-CM

## 2023-06-19 DIAGNOSIS — Z99.89 OSA ON CPAP: Primary | ICD-10-CM

## 2023-06-19 DIAGNOSIS — G47.10 HYPERSOMNOLENCE: ICD-10-CM

## 2023-06-19 PROCEDURE — 3077F SYST BP >= 140 MM HG: CPT | Performed by: INTERNAL MEDICINE

## 2023-06-19 PROCEDURE — 1160F RVW MEDS BY RX/DR IN RCRD: CPT | Performed by: INTERNAL MEDICINE

## 2023-06-19 PROCEDURE — 3079F DIAST BP 80-89 MM HG: CPT | Performed by: INTERNAL MEDICINE

## 2023-06-19 PROCEDURE — 1159F MED LIST DOCD IN RCRD: CPT | Performed by: INTERNAL MEDICINE

## 2023-06-19 PROCEDURE — 99204 OFFICE O/P NEW MOD 45 MIN: CPT | Performed by: INTERNAL MEDICINE

## 2023-06-19 RX ORDER — MODAFINIL 100 MG/1
100 TABLET ORAL DAILY
Qty: 30 TABLET | Refills: 3 | Status: SHIPPED | OUTPATIENT
Start: 2023-06-19

## 2023-06-19 NOTE — PROGRESS NOTES
New Sleep Patient Office Visit      Patient Name: Darrion Mendoza    Referring Physician: Ernestine Segundo PA-C    Chief Complaint:    Chief Complaint   Patient presents with   • Sleeping Problem       History of Present Illness: Darrion Mendoza is a 67 y.o. female who is here today to establish care with Sleep Medicine.    67-year-old female with past medical history of hypertension, depression, previous history of sleep apnea diagnosed 10 years ago presenting for initial evaluation care.  She used to see Dr. Rosas and since he has retired she is establishing care here.  Patient states that she was diagnosed with sleep apnea and she had AHI of 50.  I do not have any original sleep study available for my review.  Patient has been using CPAP machine ever since.  States that her  used to complain about her snoring and stopping breathing episodes and that has improved with the CPAP.  She also used to have dry mouth and sore throat when she would wake up in the morning and that has improved with the use of CPAP.  She denies any problems with CPAP machine device.  Today she has been tolerating it well.  She has a ResMed machine.  Denies any air leak from the mask.  She has been using nasal pillow mask.  She also uses heated modification system with that.  Overall she feels improved with the use of CPAP but sleepiness still persisting.  States that she sleeps 7-1/2 hours during the night.  She ends up taking a nap for about 20 to 30 minutes every day.  She also ends up taking 50 mg caffeine pill every day around 1130 because she is feeling sleepy and difficult to keep awake.  She works as a part-time  for her 's business and does feel fatigued and feels like she can go to sleep anytime anywhere.  Denies any symptom complex testing of narcolepsy.  Denies any restless leg symptoms.  No medication use.  Denies any smoking, alcohol use or illicit drug use.  Not much caffeine intake other  than caffeine pill.  No recent weight gain or weight loss either.    Further sleep history is as below.    [place] Scale: 18/24    Estimated average amount of sleep per night: 7.5 h  Number of times  she wakes up at night: 0-1  Difficulty falling back asleep: no  It usually takes 0 minutes to go to sleep.  She feels sleepy upon waking up: yes  Rotating or night shift work: no    Drowsiness/Sleepiness:  She exhibits the following:  excessive daytime sleepiness  excessive daytime fatigue  falls asleep during times of the day when she is quiet  takes scheduled naps during the day  sleepy even when sleep time is increased    Snoring/Breathing:  She exhibits the following:  None with CPAP    Reflux:  She describes the following:  NA    Narcolepsy:  She exhibits the following:  none    RLS/PLMs:  She describes the following:  none    Insomnia:  She describes the following:  NA    Parasomnia:  She exhibits the following:  NA    Weight:  Weight change in the last year:  Stable    Subjective      Review of Systems:   Review of Systems   Constitutional:  Positive for fatigue.   HENT:  Positive for hearing loss.    Respiratory: Negative.     Cardiovascular: Negative.    Gastrointestinal: Negative.    Endocrine: Negative.    Genitourinary:  Positive for urgency.   Musculoskeletal: Negative.    Skin: Negative.    Neurological: Negative.    Hematological: Negative.    Psychiatric/Behavioral: Negative.     All other systems reviewed and are negative.    Past Medical History:   Past Medical History:   Diagnosis Date   • Arthritis    • Depression    • Disease of thyroid gland    • Esophageal ring    • GERD (gastroesophageal reflux disease)    • H/O LEEP    • Hearing loss in left ear    • HTN (hypertension)    • Kidney stones    • ANDREIA (obstructive sleep apnea)     wears cpap, setting 6-12   • Prediabetes        Past Surgical History:   Past Surgical History:   Procedure Laterality Date   • BREAST BIOPSY Right 2006   • CHOLECYSTECTOMY   2014   • COLONOSCOPY  2011   • ESOPHAGEAL DILATATION  2009   • NEPHRECTOMY PARTIAL Right 2014   • TOTAL KNEE ARTHROPLASTY Right 5/5/2022    Procedure: TOTAL KNEE ARTHROPLASTY WITH CORI ROBOT;  Surgeon: Matt Marie MD;  Location: [facility];  Service: Robotics - Ortho;  Laterality: Right;   • UPPER GASTROINTESTINAL ENDOSCOPY  2011       Family History:   Family History   Problem Relation Age of Onset   • Thyroid disease Mother    • Obesity Mother    • Atrial fibrillation Mother    • Hypertension Mother    • Thyroid disease Father    • Emphysema Father    • Obesity Father    • Heart disease Father    • Diabetes Father    • Breast cancer Paternal Aunt    • Ovarian cancer Neg Hx        Social History:   Social History     Socioeconomic History   • Marital status:    • Number of children: 4   Tobacco Use   • Smoking status: Never     Passive exposure: Never   • Smokeless tobacco: Never   Vaping Use   • Vaping Use: Never used   Substance and Sexual Activity   • Alcohol use: No   • Drug use: No   • Sexual activity: Yes     Partners: Male     Comment:  for 41 years       Medications:     Current Outpatient Medications:   •  aspirin 81 MG EC tablet, Take 1 tablet by mouth Daily., Disp: , Rfl:   •  buPROPion XL (WELLBUTRIN XL) 150 MG 24 hr tablet, , Disp: , Rfl:   •  buPROPion XL (WELLBUTRIN XL) 300 MG 24 hr tablet, Take 1 tablet by mouth Daily., Disp: 30 tablet, Rfl: 2  •  levothyroxine (SYNTHROID, LEVOTHROID) 50 MCG tablet, TAKE 1 TABLET BY MOUTH EVERY DAY, Disp: 90 tablet, Rfl: 3  •  lisinopril-hydrochlorothiazide (PRINZIDE,ZESTORETIC) 20-12.5 MG per tablet, TAKE 1 TABLET BY MOUTH EVERY DAY, Disp: 90 tablet, Rfl: 0  •  VITAMIN D PO, Take 1,000 Units by mouth Daily., Disp: , Rfl:   •  modafinil (PROVIGIL) 100 MG tablet, Take 1 tablet by mouth Daily., Disp: 30 tablet, Rfl: 3    Allergies:   Not on File    Objective     Physical Exam:  Vital Signs:   Vitals:    06/19/23 0851   BP: 165/86   BP Location: Left  "arm   Patient Position: Sitting   Pulse: 74   SpO2: 96%   Weight: 86.2 kg (190 lb)   Height: 162.6 cm (64\")     Body mass index is 32.61 kg/m².    Physical Exam  Vitals and nursing note reviewed.   Constitutional:       General: She is not in acute distress.     Appearance: She is well-developed. She is not diaphoretic.   HENT:      Head: Normocephalic and atraumatic.      Comments: Mallampati 3 airway     Nose: Nose normal.   Eyes:      General:         Right eye: No discharge.         Left eye: No discharge.      Pupils: Pupils are equal, round, and reactive to light.   Neck:      Thyroid: No thyromegaly.      Trachea: No tracheal deviation.   Cardiovascular:      Rate and Rhythm: Normal rate and regular rhythm.      Heart sounds: Normal heart sounds. No murmur heard.    No friction rub. No gallop.   Pulmonary:      Effort: Pulmonary effort is normal. No respiratory distress.      Breath sounds: Normal breath sounds. No wheezing or rales.   Musculoskeletal:         General: No tenderness.      Cervical back: Neck supple.      Right lower leg: No edema.      Left lower leg: No edema.      Comments: Clubbing: none   Neurological:      Mental Status: She is alert and oriented to person, place, and time.   Psychiatric:         Behavior: Behavior normal.         Thought Content: Thought content normal.         Judgment: Judgment normal.       Results Review:   I reviewed the patient's new clinical results.    Patient's auto CPAP download data reviewed at pressure 6-12 cmH2O.  More than 4-hour compliance is 97%.  Patient is reaching 11.9 cm water pressure.  No significant leak noted.  AHI 0.7.    Assessment / Plan      Assessment:   Problem List Items Addressed This Visit          Sleep    ANDREIA on CPAP - Primary    Relevant Orders    PAP Therapy     Other Visit Diagnoses       Hypersomnolence        Relevant Medications    modafinil (PROVIGIL) 100 MG tablet    Elevated blood pressure reading                  Plan:   1.  " Patient with history of severe obstructive sleep apnea.  Unfortunately I do not have original sleep study available but looking at the download she seems to be doing well with the CPAP therapy.  Excellent compliance untreated sleep apnea.  Her auto CPAP is hitting the maximum ceiling of 12 cm water.  I will go ahead and change pressure settings from 6 to 15 cm water pressure.  Patient was wondering if she needs to take ramp off as well.  She does feel sometimes need for higher pressure when she is going to sleep.  I will go ahead and take ramp off.  Still keep the C-Flex on at 3 cm water pressure.  We will send orders for new adjustments to [employer].  Patient was commended on her compliance and advised to continue using the CPAP device as prescribed.  2.  Patient with residual excessive daytime sleepiness despite treatment of sleep apnea.  Discussed that in minority of patients we can still have this excessive sleepiness persisting.  We discussed treatment options for that.  I would like to try her on low-dose modafinil 100 mg daily to see if that will help improve things.  Side effects reviewed.  After discussion patient is willing to give modafinil a try.  Tyrone report reviewed.  We will go ahead and get it preauthorized 20 mg dose once a day.  Cardiac side effects reviewed as well.  Prescription called in.  3.  Patient's blood pressure is elevated.  Advised to continue close monitoring at home.  If blood pressure remains elevated then she will need further adjustments of her medications.  If she started noticing her blood pressure with metaphor or palpitation or any other cardiac side effects may need to back off or try alternative agent such as Sunosi.  4.  Increasing activity and weight loss advised and its impact on sleep apnea reviewed.    We will follow her in 5 to 6 months or sooner as needed.  Patient is comfortable with our discussion and had no further questions.      Follow Up:   Return in about 6  months (around 12/19/2023) for Recheck.    Discussed plan of care in detail with patient today. Patient verbally understands and agrees.        Randell Ken MD  Pulmonary Critical Care and Sleep Medicine

## 2023-07-26 ENCOUNTER — OFFICE VISIT (OUTPATIENT)
Dept: ORTHOPEDIC SURGERY | Facility: CLINIC | Age: 68
End: 2023-07-26
Payer: MEDICARE

## 2023-07-26 VITALS
DIASTOLIC BLOOD PRESSURE: 78 MMHG | HEIGHT: 63 IN | WEIGHT: 188 LBS | SYSTOLIC BLOOD PRESSURE: 168 MMHG | BODY MASS INDEX: 33.31 KG/M2

## 2023-07-26 DIAGNOSIS — M25.531 RIGHT WRIST PAIN: Primary | ICD-10-CM

## 2023-07-26 DIAGNOSIS — R20.2 NUMBNESS AND TINGLING IN RIGHT HAND: ICD-10-CM

## 2023-07-26 DIAGNOSIS — R73.03 PREDIABETES: ICD-10-CM

## 2023-07-26 DIAGNOSIS — G56.01 CARPAL TUNNEL SYNDROME OF RIGHT WRIST: ICD-10-CM

## 2023-07-26 DIAGNOSIS — R20.0 NUMBNESS AND TINGLING IN RIGHT HAND: ICD-10-CM

## 2023-07-26 RX ORDER — HYDROCHLOROTHIAZIDE 12.5 MG/1
CAPSULE, GELATIN COATED ORAL
COMMUNITY
End: 2023-07-26 | Stop reason: SDUPTHER

## 2023-07-26 NOTE — PROGRESS NOTES
[facility] Office Visit     Office Visit       Date: 07/26/2023   Patient Name: Darrion Mendoza  MRN: [id number]  YOB: 1955    Referring Physician: Maged Santacruz MD     Chief Complaint:   Chief Complaint   Patient presents with    Right Wrist - Pain       History of Present Illness:   Darrion Mendoza is a 67 y.o. female who presents with new problem of: right wrist pain.  Onset: atraumatic and gradual in nature. The issue has been ongoing for 1 - 2 year(s). Pain is a 1/10 on the pain scale. Pain is described as  numbness . Associated symptoms include pain and numbness/tingling . The pain is worse with  use of hand ; nothing improve the pain. Previous treatments have included: bracing and NSAIDS.    Subjective   Review of Systems: Review of Systems   Constitutional:  Negative for chills, fever, unexpected weight gain and unexpected weight loss.   HENT:  Negative for congestion, postnasal drip and rhinorrhea.    Eyes:  Negative for blurred vision.   Respiratory:  Negative for shortness of breath.    Cardiovascular:  Negative for leg swelling.   Gastrointestinal:  Negative for abdominal pain, nausea and vomiting.   Genitourinary:  Negative for difficulty urinating.   Musculoskeletal:  Positive for arthralgias. Negative for gait problem, joint swelling and myalgias.   Skin:  Negative for skin lesions and wound.   Neurological:  Negative for dizziness, weakness, light-headedness and numbness.   Hematological:  Does not bruise/bleed easily.   Psychiatric/Behavioral:  Negative for depressed mood.       I have reviewed the following portions of the patient's history:History of Present Illness and review of systems.    Past Medical History:   Past Medical History:   Diagnosis Date    Arthritis     Depression     Disease of thyroid gland     Esophageal ring     GERD (gastroesophageal reflux disease)     H/O LEEP     Hearing loss in left ear      HTN (hypertension)     Kidney stones     ANDREIA (obstructive sleep apnea)     wears cpap, setting 6-12    Prediabetes        Past Surgical History:   Past Surgical History:   Procedure Laterality Date    BREAST BIOPSY Right 2006    CHOLECYSTECTOMY  2014    COLONOSCOPY  2011    ESOPHAGEAL DILATATION  2009    NEPHRECTOMY PARTIAL Right 2014    TOTAL KNEE ARTHROPLASTY Right 5/5/2022    Procedure: TOTAL KNEE ARTHROPLASTY WITH CORI ROBOT;  Surgeon: Matt Marie MD;  Location: [facility];  Service: Robotics - Ortho;  Laterality: Right;    UPPER GASTROINTESTINAL ENDOSCOPY  2011       Family History:   Family History   Problem Relation Age of Onset    Thyroid disease Mother     Obesity Mother     Atrial fibrillation Mother     Hypertension Mother     Thyroid disease Father     Emphysema Father     Obesity Father     Heart disease Father     Diabetes Father     Breast cancer Paternal Aunt     Ovarian cancer Neg Hx        Social History:   Social History     Socioeconomic History    Marital status:     Number of children: 4   Tobacco Use    Smoking status: Never     Passive exposure: Never    Smokeless tobacco: Never   Vaping Use    Vaping Use: Never used   Substance and Sexual Activity    Alcohol use: No    Drug use: No    Sexual activity: Yes     Partners: Male     Comment:  for 41 years       Medications:   Current Outpatient Medications:     aspirin 81 MG EC tablet, Take 1 tablet by mouth Daily., Disp: , Rfl:     buPROPion XL (WELLBUTRIN XL) 300 MG 24 hr tablet, Take 1 tablet by mouth Daily., Disp: 30 tablet, Rfl: 2    levothyroxine (SYNTHROID, LEVOTHROID) 50 MCG tablet, TAKE 1 TABLET BY MOUTH EVERY DAY, Disp: 90 tablet, Rfl: 3    lisinopril-hydrochlorothiazide (PRINZIDE,ZESTORETIC) 20-12.5 MG per tablet, TAKE 1 TABLET BY MOUTH EVERY DAY, Disp: 90 tablet, Rfl: 0    modafinil (PROVIGIL) 100 MG tablet, Take 1 tablet by mouth Daily., Disp: 30 tablet, Rfl: 3    VITAMIN D PO, Take 1,000 Units by mouth Daily.,  "Disp: , Rfl:     Allergies: No Known Allergies    I reviewed the patient's chief complaint, history of present illness, review of systems, past medical history, surgical history, family history, social history, medications and allergy list.     Objective    Vital Signs:   Vitals:    07/26/23 0734   BP: 168/78   Weight: 85.3 kg (188 lb)   Height: 160 cm (63\")     Body mass index is 33.3 kg/m². BMI is >= 30 and <35. (Class 1 Obesity). The following options were offered after discussion;: exercise counseling/recommendations and nutrition counseling/recommendations     Patient reports that she is a non-smoker and has not ever been a smoker.  This behavior was applauded and she was encouraged to continue in smoking cessation.  We will continue to monitor at subsequent visits.     Ortho Exam:  Constitutional: General Appearance: healthy-appearing, NAD, and normal body habitus.   Psychiatric: Orientation: oriented to person, place, and time. Mood and Affect: normal mood and affect and active and alert.   Cardiovascular System: Arterial Pulses Left: radial pulse normal and ulnar pulse normal. Edema Left: none. Varicosities Left: no varicosities and capillary refill test normal.   Skin: Right Upper Extremity: normal. Left Upper Extremity: normal.   Neurological System: Sensation on the Right: normal ulnar nerve distribution, radial nerve distribution, and at the dorsal 1st web space and decreased median nerve distribution and tactile decrease distal extremities. Special Tests on the Right: Tinel's sign at the median nerve positive, carpal compression test positive, and Phalen's test positive.   right wrist exam:   Normal wrist contour without evidence of swelling or ecchymosis.   Negative tenderness to palpation at the wrist or carpal bones.   Full motion of all digits.   Negative thenar atrophy  Left wrist exam:   Contralateral wrist exam shows no evidence of swelling or ecchymosis.   Full motion intact.   Nontender " throughout     Results Review:   Imaging Results (Last 24 Hours)       Procedure Component Value Units Date/Time    XR Wrist 3+ View Right [[id number]] Resulted: 07/26/23 0754     Updated: 07/26/23 0755    Narrative:      Indication: Right wrist pain    Views: AP and lateral views of the wrist are submitted.     Impression: Alignment is normal. There are no acute findings. There is no   fracture subluxation or dislocation.  Mild degenerative changes present in   the CMC and radiocarpal joint.    Comparison: No additional images available for comparison review.            Procedures    Assessment / Plan    Assessment/Plan:   Diagnoses and all orders for this visit:    1. Right wrist pain (Primary)  -     XR Wrist 3+ View Right    2. Carpal tunnel syndrome of right wrist  -     EMG & Nerve Conduction Test    3. Numbness and tingling in right hand    4. Prediabetes      1 year of worsening right hand and finger pain numbness and tingling.  Radiographs not show any acute osseous abnormality.  He has a positive Tinel tap on exam consistent with diagnosis of carpal tunnel syndrome.  She is most on through the thumb index and middle fingers of the hand.  Her  strength is sustained.  Divided her with a copy of her radiographs and we discussed them in detail.  I explained her that based off her exam I think that carpal tunnel is most likely diagnosis.  I would like to evaluate further with EMG of her right upper extremity.  This will provide valuable data on severity of the syndrome.  I also explained that diabetes and hypothyroidism can contribute to this problem.  She is currently prediabetic with a hemoglobin A1c of 6.30%.  Encouraged good glycemic control to manage this issue.  I would have her continue with anti-inflammatory medication on an as-needed basis.  I will continue with cock-up wrist brace especially at night when sleeping.  I will see her back after the EMG to discuss results and next steps in her  care.    Previous documentation reviewed: 5/8/2023-Matt Marie MD-office visit.    Previous laboratory results reviewed: 3/10/2023-hemoglobin A1c 6.30%.  Creatinine 1.16, EGFR 51.8.    Follow Up:   Return for EMG RIGHT UPPER EXTREMITY REVIEW.      Malachi Santacruz MD  [facility]

## 2023-08-18 ENCOUNTER — TELEPHONE (OUTPATIENT)
Dept: ORTHOPEDIC SURGERY | Facility: CLINIC | Age: 68
End: 2023-08-18
Payer: MEDICARE

## 2023-08-18 NOTE — TELEPHONE ENCOUNTER
Neurology dept called stating referral was sent to wrong office. They do not preform EMG NCV test- test needs to be preformed at the hospital. Notified Lo

## 2023-08-23 ENCOUNTER — TELEPHONE (OUTPATIENT)
Dept: PULMONOLOGY | Facility: CLINIC | Age: 68
End: 2023-08-23

## 2023-08-23 DIAGNOSIS — Z99.89 OSA ON CPAP: ICD-10-CM

## 2023-08-23 DIAGNOSIS — G47.33 OSA ON CPAP: ICD-10-CM

## 2023-08-23 NOTE — TELEPHONE ENCOUNTER
"    Caller: Darrion Mendoza \"SD\"    Relationship: Self    Best call back number: [phone]    What form or medical record are you requesting: PROVIDER NOTES     Who is requesting this form or medical record from you: [facility] [employer]     How would you like to receive the form or medical records (pick-up, mail, fax): FAX   If fax, what is the fax number: UNSURE, PHONE [phone]      Timeframe paperwork needed: ASAP    Additional notes: PT IS ATTEMPTING TO GET SLEEP SUPPLIES THROUGH [facility] BUT ORDER WAS UNABLE TO BE PROCESSED BECAUSE PROVIDER NOTES WERE NOT SENT.         "

## 2023-08-23 NOTE — TELEPHONE ENCOUNTER
"    Caller: Darrion Mendoza \"SD\"    Relationship to patient: Self    Best call back number: [phone]    Patient is needing: RENITA NEEDS A RX FOR PT TO GET SUPPLIES.   PLEASE FAX [phone]    [employer]  FILTERS          "

## 2023-08-23 NOTE — TELEPHONE ENCOUNTER
Spoke with patient. She states that patient aids did not her situation professionally and would lke to switch to [employer]. Faxed order and info

## 2023-09-24 DIAGNOSIS — I10 ESSENTIAL HYPERTENSION: ICD-10-CM

## 2023-09-25 RX ORDER — LISINOPRIL AND HYDROCHLOROTHIAZIDE 20; 12.5 MG/1; MG/1
TABLET ORAL
Qty: 90 TABLET | Refills: 0 | Status: SHIPPED | OUTPATIENT
Start: 2023-09-25

## 2023-10-10 ENCOUNTER — TELEPHONE (OUTPATIENT)
Dept: FAMILY MEDICINE CLINIC | Facility: CLINIC | Age: 68
End: 2023-10-10

## 2023-10-10 DIAGNOSIS — H91.93 BILATERAL HEARING LOSS, UNSPECIFIED HEARING LOSS TYPE: Primary | ICD-10-CM

## 2023-10-10 NOTE — TELEPHONE ENCOUNTER
"Caller: Darrion Mendoza \"DS\"    Relationship: Self    Best call back number:  [phone]     What is the medical concern/diagnosis: PATIENT HAS AN HEARING AID AND NEEDS IT CHECKED     What specialty or service is being requested: HEARING AND SPEECH CENTER   [phone]    What is the provider, practice or medical service name:     What is the office location: [place]     What is the office phone number: [phone]    Any additional details: PATIENT SAID IT NEEDS TO STATE HAVE A HEARING EVALUATION OR EVALUATE AND TREAT       "

## 2023-10-26 DIAGNOSIS — G47.10 HYPERSOMNOLENCE: ICD-10-CM

## 2023-10-27 RX ORDER — MODAFINIL 100 MG/1
100 TABLET ORAL DAILY
Qty: 30 TABLET | Refills: 3 | Status: SHIPPED | OUTPATIENT
Start: 2023-10-27

## 2023-12-22 ENCOUNTER — TELEPHONE (OUTPATIENT)
Dept: PULMONOLOGY | Facility: CLINIC | Age: 68
End: 2023-12-22

## 2023-12-22 DIAGNOSIS — I10 ESSENTIAL HYPERTENSION: ICD-10-CM

## 2023-12-22 RX ORDER — LISINOPRIL AND HYDROCHLOROTHIAZIDE 20; 12.5 MG/1; MG/1
TABLET ORAL
Qty: 90 TABLET | Refills: 0 | Status: SHIPPED | OUTPATIENT
Start: 2023-12-22

## 2023-12-22 NOTE — TELEPHONE ENCOUNTER
I had the opportunity to review the med list and/or validate the medication prescribed by the provider: DR. DESHPANDE  Medication: modafinil (PROVIGIL) 100 MG tablet   Dose: 100 mg Route: Oral Frequency: Daily   Dispense Quantity: 30 tablet Refills: 3    Note to Pharmacy: Not to exceed 2 additional fills before 12/16/2023         Sig: TAKE 1 TABLET BY MOUTH EVERY DAY     Pharmacy Type (local, mail order, specialty): LOCAL  Pharmacy Name:   [facility]/pharmacy #[id number] [place], [place] - [address]  - [phone]  - [phone]     Supply Amount (e.g, 30 or 90 days): 90    COMMENTS: PATIENT STATES THE MEDICATION HAS BEEN HELPFUL AND WOULD LIKE TO CHANGE THE PRESCRIPTION TO A 90 DAY SUPPLY.

## 2023-12-28 NOTE — PROGRESS NOTES
Sleep Clinic Follow Up Note    Chief Complaint  Follow-up (She has been doing very well. She likes the modafinil and wants a 90 prescription for it )    Subjective     History of Present Illness (from previous encounter on 6/19/2023 with Dr. Ken):  67-year-old female with past medical history of hypertension, depression, previous history of sleep apnea diagnosed 10 years ago presenting for initial evaluation care.  She used to see Dr. Rosas and since he has retired she is establishing care here.  Patient states that she was diagnosed with sleep apnea and she had AHI of 50.  I do not have any original sleep study available for my review.  Patient has been using CPAP machine ever since.  States that her  used to complain about her snoring and stopping breathing episodes and that has improved with the CPAP.  She also used to have dry mouth and sore throat when she would wake up in the morning and that has improved with the use of CPAP.  She denies any problems with CPAP machine device.  Today she has been tolerating it well.  She has a ResMed machine.  Denies any air leak from the mask.  She has been using nasal pillow mask.  She also uses heated modification system with that.  Overall she feels improved with the use of CPAP but sleepiness still persisting.  States that she sleeps 7-1/2 hours during the night.  She ends up taking a nap for about 20 to 30 minutes every day.  She also ends up taking 50 mg caffeine pill every day around 1130 because she is feeling sleepy and difficult to keep awake.  She works as a part-time  for her 's business and does feel fatigued and feels like she can go to sleep anytime anywhere.  Denies any symptom complex testing of narcolepsy.  Denies any restless leg symptoms.  No medication use.  Denies any smoking, alcohol use or illicit drug use.  Not much caffeine intake other than caffeine pill.  No recent weight gain or weight loss either.     Further sleep  history is as below.     [place] Scale: 18/24 (End copied text).    Interval History:  Darrion Mendoza is a 68 y.o. female returns for follow up and compliance of PAP therapy. The patient was last seen on 2023 with Dr. Ken. Overall the patient feels good with regard to therapy. The device appears to be working appropriately. On average the patient sleeps 7-8 hours per night. The patient wakes 0-1 time per night.     She is doing well with the modafanil and is not having any side effects    The patient reports the following changes to their medical and medication history since they were last seen:  None    Further details are as follows:      [place] Scale is (out of 24): Total score: 9     Weight:  Current Weight: 191 lb    The patient's relevant past medical, surgical, family, and social history reviewed and updated in Epic as appropriate.    PMH:    Past Medical History:   Diagnosis Date    Annual physical exam 2020    Arthritis     Depression     Disease of thyroid gland     Esophageal ring     GERD (gastroesophageal reflux disease)     H/O LEEP     Hearing loss in left ear     HTN (hypertension)     Kidney stones     ANDREIA (obstructive sleep apnea)     wears cpap, setting 6-12    Prediabetes      Past Surgical History:   Procedure Laterality Date    BREAST BIOPSY Right 2006    CARPAL TUNNEL RELEASE Right 10/2023    CHOLECYSTECTOMY  2014    COLONOSCOPY  2011    ESOPHAGEAL DILATATION  2009    NEPHRECTOMY PARTIAL Right 2014    TOTAL KNEE ARTHROPLASTY Right 2022    Procedure: TOTAL KNEE ARTHROPLASTY WITH CORI ROBOT;  Surgeon: Matt Marie MD;  Location: [facility];  Service: Robotics - Ortho;  Laterality: Right;    UPPER GASTROINTESTINAL ENDOSCOPY       OB History          3    Para   3    Term   3            AB        Living             SAB        IAB        Ectopic        Molar        Multiple        Live Births                  No Known Allergies    MEDS:  Prior to  "Admission medications    Medication Sig Start Date End Date Taking? Authorizing Provider   aspirin 81 MG EC tablet Take 1 tablet by mouth Daily. 9/21/22   Indigo Paul MD   buPROPion XL (WELLBUTRIN XL) 300 MG 24 hr tablet Take 1 tablet by mouth Daily. 12/24/19   Wilma David APRN   levothyroxine (SYNTHROID, LEVOTHROID) 50 MCG tablet TAKE 1 TABLET BY MOUTH EVERY DAY 3/20/23   Indigo Paul MD   lisinopril-hydrochlorothiazide (PRINZIDE,ZESTORETIC) 20-12.5 MG per tablet TAKE 1 TABLET BY MOUTH EVERY DAY 12/22/23   Indigo Paul MD   modafinil (PROVIGIL) 100 MG tablet TAKE 1 TABLET BY MOUTH EVERY DAY 10/27/23   Randell Ken MD   VITAMIN D PO Take 1,000 Units by mouth Daily.    Provider, MD Sharona         FH:  Family History   Problem Relation Age of Onset    Thyroid disease Mother     Obesity Mother     Atrial fibrillation Mother     Hypertension Mother     Thyroid disease Father     Emphysema Father     Obesity Father     Heart disease Father     Diabetes Father     Breast cancer Paternal Aunt     Ovarian cancer Neg Hx        Objective   Vital Signs:  /74 (BP Location: Left arm, Patient Position: Sitting, Cuff Size: Large Adult)   Pulse 71   Temp 98.3 °F (36.8 °C) (Infrared)   Ht 160 cm (63\")   Wt 86.9 kg (191 lb 9.6 oz)   SpO2 97%   BMI 33.94 kg/m²              Physical Exam          Result Review :           PAP Report:  AHI:1.1/h  Days of Usage: 30/30 (100%)  Number of Days Greater than 4 hours: 29/30 (97%)  Average time (days used): 7 hours 1 minute  95th Percentile Pressure: 11.3 cm H2O  95th percentile leaks: 0.9 L/min  Settings: Auto CPAP-6/12 cm H2O, EPR full-time, EPR level 1, response soft.       Assessment and Plan  Darrion Mendoza is a 68 y.o. female who returns for follow-up and compliance of PAP therapy.  The Pap report has been reviewed.  Overall usage is at 100% with compliance at 97%.  Patient averages 7 hours 1 minute.  Sleep apnea is well-controlled with an " AHI of 1.1/H.I will refill the patient's supplies, and I have asked them to return for follow-up and compliance in 1 year or sooner should they have further questions or concerns.     With regard to modafinil, the patient is due for urine drug screen and medication contract.  We will obtain these today.  Otherwise, she is doing well with the medication and wishes to continue.  Patient reports that she still can fall asleep during the day but does not feel the need to do so.  She requests a 90-day supply.  Tyrone has been reviewed.    Diagnoses and all orders for this visit:    1. ANDREIA on CPAP (Primary)  -     PAP Therapy    2. Hypersomnia with sleep apnea    3. High risk medication use  -     Urine Drug Screen - Urine, Clean Catch; Future         The patient continues to use and benefit from PAP therapy.    1. The patient was counseled regarding multimodal approach with healthy nutrition, healthy sleep, regular physical activity, social activities, counseling, and medications. Encouraged to practice lateral sleep position. Avoid alcohol and sedatives close to bedtime.     2.  We will refill supplies x1 year.  Return to clinic 1 year or sooner if symptoms warrant. I have reviewed the results of my evaluation and impression and discussed my recommendations in detail with the patient.           Follow Up  Return in about 6 months (around 7/2/2024) for Recheck.  Patient was given instructions and counseling regarding her condition or for health maintenance advice. Please see specific information pulled into the AVS if appropriate.       JESIKA Frey, ACNP-BC  Pulmonology, Critical Care, and Sleep Medicine

## 2024-01-02 ENCOUNTER — OFFICE VISIT (OUTPATIENT)
Dept: SLEEP MEDICINE | Facility: HOSPITAL | Age: 69
End: 2024-01-02
Payer: MEDICARE

## 2024-01-02 ENCOUNTER — LAB (OUTPATIENT)
Dept: LAB | Facility: HOSPITAL | Age: 69
End: 2024-01-02
Payer: MEDICARE

## 2024-01-02 VITALS
WEIGHT: 191.6 LBS | HEART RATE: 71 BPM | HEIGHT: 63 IN | SYSTOLIC BLOOD PRESSURE: 144 MMHG | OXYGEN SATURATION: 97 % | BODY MASS INDEX: 33.95 KG/M2 | TEMPERATURE: 98.3 F | DIASTOLIC BLOOD PRESSURE: 74 MMHG

## 2024-01-02 DIAGNOSIS — Z79.899 HIGH RISK MEDICATION USE: ICD-10-CM

## 2024-01-02 DIAGNOSIS — G47.33 OSA ON CPAP: Primary | ICD-10-CM

## 2024-01-02 DIAGNOSIS — G47.10 HYPERSOMNIA WITH SLEEP APNEA: ICD-10-CM

## 2024-01-02 DIAGNOSIS — G47.30 HYPERSOMNIA WITH SLEEP APNEA: ICD-10-CM

## 2024-01-02 LAB
AMPHET+METHAMPHET UR QL: NEGATIVE
AMPHETAMINES UR QL: NEGATIVE
BARBITURATES UR QL SCN: NEGATIVE
BENZODIAZ UR QL SCN: NEGATIVE
BUPRENORPHINE SERPL-MCNC: NEGATIVE NG/ML
CANNABINOIDS SERPL QL: NEGATIVE
COCAINE UR QL: NEGATIVE
FENTANYL UR-MCNC: NEGATIVE NG/ML
METHADONE UR QL SCN: NEGATIVE
OPIATES UR QL: NEGATIVE
OXYCODONE UR QL SCN: NEGATIVE
PCP UR QL SCN: NEGATIVE
TRICYCLICS UR QL SCN: NEGATIVE

## 2024-01-02 PROCEDURE — 80307 DRUG TEST PRSMV CHEM ANLYZR: CPT

## 2024-01-02 PROCEDURE — 1160F RVW MEDS BY RX/DR IN RCRD: CPT | Performed by: NURSE PRACTITIONER

## 2024-01-02 PROCEDURE — 3077F SYST BP >= 140 MM HG: CPT | Performed by: NURSE PRACTITIONER

## 2024-01-02 PROCEDURE — 1159F MED LIST DOCD IN RCRD: CPT | Performed by: NURSE PRACTITIONER

## 2024-01-02 PROCEDURE — 3078F DIAST BP <80 MM HG: CPT | Performed by: NURSE PRACTITIONER

## 2024-01-02 PROCEDURE — 99213 OFFICE O/P EST LOW 20 MIN: CPT | Performed by: NURSE PRACTITIONER

## 2024-01-06 DIAGNOSIS — G47.10 HYPERSOMNOLENCE: ICD-10-CM

## 2024-01-06 RX ORDER — MODAFINIL 100 MG/1
100 TABLET ORAL DAILY
Qty: 30 TABLET | Refills: 3 | Status: SHIPPED | OUTPATIENT
Start: 2024-01-06

## 2024-02-14 ENCOUNTER — TELEPHONE (OUTPATIENT)
Dept: FAMILY MEDICINE CLINIC | Facility: CLINIC | Age: 69
End: 2024-02-14

## 2024-02-14 NOTE — TELEPHONE ENCOUNTER
"  Caller: Darrion Mendoza \"SD\"    Relationship: Self    Best call back number: [phone]     What is the best time to reach you: ANYTIME    Who are you requesting to speak with (clinical staff, provider,  specific staff member): CLINICAL OR PROVIDER    Do you know the name of the person who called: NA    What was the call regarding: PATIENT WOULD LIKE A CALL BACK TO DISCUSS THE ARTICLE SHE DROPPED OFF IN REGARDS TO HERPES AND ALZHEIMER'S. PLEASE CALL TO DISCUSS.      Is it okay if the provider responds through MyChart:NO    "

## 2024-02-15 NOTE — TELEPHONE ENCOUNTER
I called the pt and advised her that she could make an appointment to discuss. She has an appointment in March and will discuss at that time.

## 2024-02-20 ENCOUNTER — TRANSCRIBE ORDERS (OUTPATIENT)
Dept: ADMINISTRATIVE | Facility: HOSPITAL | Age: 69
End: 2024-02-20
Payer: MEDICARE

## 2024-02-20 DIAGNOSIS — Z12.31 VISIT FOR SCREENING MAMMOGRAM: Primary | ICD-10-CM

## 2024-03-08 DIAGNOSIS — I10 ESSENTIAL HYPERTENSION: ICD-10-CM

## 2024-03-08 DIAGNOSIS — E03.9 HYPOTHYROIDISM, UNSPECIFIED TYPE: ICD-10-CM

## 2024-03-08 NOTE — TELEPHONE ENCOUNTER
Rx Refill Note  Requested Prescriptions     Pending Prescriptions Disp Refills    levothyroxine (SYNTHROID, LEVOTHROID) 50 MCG tablet [Pharmacy Med Name: LEVOTHYROXINE 50 MCG TABLET] 90 tablet 3     Sig: TAKE 1 TABLET BY MOUTH EVERY DAY    lisinopril-hydrochlorothiazide (PRINZIDE,ZESTORETIC) 20-12.5 MG per tablet [Pharmacy Med Name: LISINOPRIL-HCTZ 20-12.5 MG TAB] 90 tablet 0     Sig: TAKE 1 TABLET BY MOUTH EVERY DAY      Last office visit with prescribing clinician: 03/10/2023  Next office visit with prescribing clinician: 3/13/2024     Naz He MA  03/08/24, 14:19 EST    Last fill: 03/20/2023-Levythyroxine  Last fill: 12/22/2023-LIsinopril

## 2024-03-10 RX ORDER — LEVOTHYROXINE SODIUM 0.05 MG/1
TABLET ORAL
Qty: 90 TABLET | Refills: 3 | Status: SHIPPED | OUTPATIENT
Start: 2024-03-10

## 2024-03-10 RX ORDER — LISINOPRIL AND HYDROCHLOROTHIAZIDE 20; 12.5 MG/1; MG/1
TABLET ORAL
Qty: 90 TABLET | Refills: 3 | Status: SHIPPED | OUTPATIENT
Start: 2024-03-10 | End: 2024-06-08

## 2024-03-12 ENCOUNTER — OFFICE VISIT (OUTPATIENT)
Dept: FAMILY MEDICINE CLINIC | Facility: CLINIC | Age: 69
End: 2024-03-12
Payer: MEDICARE

## 2024-03-12 ENCOUNTER — LAB (OUTPATIENT)
Dept: LAB | Facility: HOSPITAL | Age: 69
End: 2024-03-12
Payer: MEDICARE

## 2024-03-12 VITALS
BODY MASS INDEX: 34.62 KG/M2 | WEIGHT: 195.4 LBS | OXYGEN SATURATION: 97 % | DIASTOLIC BLOOD PRESSURE: 90 MMHG | RESPIRATION RATE: 19 BRPM | HEIGHT: 63 IN | TEMPERATURE: 97.8 F | HEART RATE: 88 BPM | SYSTOLIC BLOOD PRESSURE: 150 MMHG

## 2024-03-12 DIAGNOSIS — E03.9 ACQUIRED HYPOTHYROIDISM: ICD-10-CM

## 2024-03-12 DIAGNOSIS — Z00.00 MEDICARE ANNUAL WELLNESS VISIT, SUBSEQUENT: Primary | ICD-10-CM

## 2024-03-12 DIAGNOSIS — E66.09 CLASS 1 OBESITY DUE TO EXCESS CALORIES WITHOUT SERIOUS COMORBIDITY WITH BODY MASS INDEX (BMI) OF 34.0 TO 34.9 IN ADULT: ICD-10-CM

## 2024-03-12 DIAGNOSIS — I10 ESSENTIAL HYPERTENSION: ICD-10-CM

## 2024-03-12 DIAGNOSIS — R73.03 PREDIABETES: ICD-10-CM

## 2024-03-12 LAB
ALBUMIN SERPL-MCNC: 4.5 G/DL (ref 3.5–5.2)
ALBUMIN/GLOB SERPL: 1.7 G/DL
ALP SERPL-CCNC: 103 U/L (ref 39–117)
ALT SERPL W P-5'-P-CCNC: 23 U/L (ref 1–33)
ANION GAP SERPL CALCULATED.3IONS-SCNC: 16.4 MMOL/L (ref 5–15)
AST SERPL-CCNC: 20 U/L (ref 1–32)
BASOPHILS # BLD AUTO: 0.08 10*3/MM3 (ref 0–0.2)
BASOPHILS NFR BLD AUTO: 0.9 % (ref 0–1.5)
BILIRUB SERPL-MCNC: 0.4 MG/DL (ref 0–1.2)
BUN SERPL-MCNC: 15 MG/DL (ref 8–23)
BUN/CREAT SERPL: 13.8 (ref 7–25)
CALCIUM SPEC-SCNC: 9.6 MG/DL (ref 8.6–10.5)
CHLORIDE SERPL-SCNC: 103 MMOL/L (ref 98–107)
CHOLEST SERPL-MCNC: 203 MG/DL (ref 0–200)
CO2 SERPL-SCNC: 24.6 MMOL/L (ref 22–29)
CREAT SERPL-MCNC: 1.09 MG/DL (ref 0.57–1)
DEPRECATED RDW RBC AUTO: 40.4 FL (ref 37–54)
EGFRCR SERPLBLD CKD-EPI 2021: 55.4 ML/MIN/1.73
EOSINOPHIL # BLD AUTO: 0.12 10*3/MM3 (ref 0–0.4)
EOSINOPHIL NFR BLD AUTO: 1.3 % (ref 0.3–6.2)
ERYTHROCYTE [DISTWIDTH] IN BLOOD BY AUTOMATED COUNT: 12.9 % (ref 12.3–15.4)
GLOBULIN UR ELPH-MCNC: 2.7 GM/DL
GLUCOSE SERPL-MCNC: 102 MG/DL (ref 65–99)
HCT VFR BLD AUTO: 39.9 % (ref 34–46.6)
HDLC SERPL-MCNC: 41 MG/DL (ref 40–60)
HGB BLD-MCNC: 13.5 G/DL (ref 12–15.9)
IMM GRANULOCYTES # BLD AUTO: 0.07 10*3/MM3 (ref 0–0.05)
IMM GRANULOCYTES NFR BLD AUTO: 0.8 % (ref 0–0.5)
LDLC SERPL CALC-MCNC: 121 MG/DL (ref 0–100)
LDLC/HDLC SERPL: 2.82 {RATIO}
LYMPHOCYTES # BLD AUTO: 1.43 10*3/MM3 (ref 0.7–3.1)
LYMPHOCYTES NFR BLD AUTO: 15.7 % (ref 19.6–45.3)
MCH RBC QN AUTO: 29.5 PG (ref 26.6–33)
MCHC RBC AUTO-ENTMCNC: 33.8 G/DL (ref 31.5–35.7)
MCV RBC AUTO: 87.1 FL (ref 79–97)
MONOCYTES # BLD AUTO: 0.82 10*3/MM3 (ref 0.1–0.9)
MONOCYTES NFR BLD AUTO: 9 % (ref 5–12)
NEUTROPHILS NFR BLD AUTO: 6.6 10*3/MM3 (ref 1.7–7)
NEUTROPHILS NFR BLD AUTO: 72.3 % (ref 42.7–76)
NRBC BLD AUTO-RTO: 0 /100 WBC (ref 0–0.2)
PLATELET # BLD AUTO: 216 10*3/MM3 (ref 140–450)
PMV BLD AUTO: 10.9 FL (ref 6–12)
POTASSIUM SERPL-SCNC: 3.8 MMOL/L (ref 3.5–5.2)
PROT SERPL-MCNC: 7.2 G/DL (ref 6–8.5)
RBC # BLD AUTO: 4.58 10*6/MM3 (ref 3.77–5.28)
SODIUM SERPL-SCNC: 144 MMOL/L (ref 136–145)
TRIGL SERPL-MCNC: 231 MG/DL (ref 0–150)
VLDLC SERPL-MCNC: 41 MG/DL (ref 5–40)
WBC NRBC COR # BLD AUTO: 9.12 10*3/MM3 (ref 3.4–10.8)

## 2024-03-12 PROCEDURE — 85025 COMPLETE CBC W/AUTO DIFF WBC: CPT

## 2024-03-12 PROCEDURE — 84443 ASSAY THYROID STIM HORMONE: CPT

## 2024-03-12 PROCEDURE — 80053 COMPREHEN METABOLIC PANEL: CPT

## 2024-03-12 PROCEDURE — 80061 LIPID PANEL: CPT

## 2024-03-12 PROCEDURE — 83036 HEMOGLOBIN GLYCOSYLATED A1C: CPT

## 2024-03-12 PROCEDURE — 3080F DIAST BP >= 90 MM HG: CPT | Performed by: FAMILY MEDICINE

## 2024-03-12 PROCEDURE — 3077F SYST BP >= 140 MM HG: CPT | Performed by: FAMILY MEDICINE

## 2024-03-12 PROCEDURE — 1170F FXNL STATUS ASSESSED: CPT | Performed by: FAMILY MEDICINE

## 2024-03-12 PROCEDURE — 1159F MED LIST DOCD IN RCRD: CPT | Performed by: FAMILY MEDICINE

## 2024-03-12 PROCEDURE — 1160F RVW MEDS BY RX/DR IN RCRD: CPT | Performed by: FAMILY MEDICINE

## 2024-03-12 PROCEDURE — G0439 PPPS, SUBSEQ VISIT: HCPCS | Performed by: FAMILY MEDICINE

## 2024-03-12 RX ORDER — LISINOPRIL AND HYDROCHLOROTHIAZIDE 20; 12.5 MG/1; MG/1
2 TABLET ORAL DAILY
Qty: 180 TABLET | Refills: 3 | Status: SHIPPED | OUTPATIENT
Start: 2024-03-12 | End: 2025-03-07

## 2024-03-12 NOTE — PROGRESS NOTES
The ABCs of the Annual Wellness Visit  Subsequent Medicare Wellness Visit    Subjective    Darrion Mendoza is a 68 y.o. female who presents for a Subsequent Medicare Wellness Visit.    The following portions of the patient's history were reviewed and   updated as appropriate: allergies, current medications, past family history, past medical history, past social history, past surgical history, and problem list.    Compared to one year ago, the patient feels her physical   health is the same.    Compared to one year ago, the patient feels her mental   health is the same.    Recent Hospitalizations:  She was not admitted to the hospital during the last year.       Current Medical Providers:  Patient Care Team:  Indigo Paul MD as PCP - General (Family Medicine)  Matt Marie MD as Consulting Physician (Orthopedic Surgery)    Outpatient Medications Prior to Visit   Medication Sig Dispense Refill    aspirin 81 MG EC tablet Take 1 tablet by mouth Daily.      buPROPion XL (WELLBUTRIN XL) 300 MG 24 hr tablet Take 1 tablet by mouth Daily. 30 tablet 2    levothyroxine (SYNTHROID, LEVOTHROID) 50 MCG tablet TAKE 1 TABLET BY MOUTH EVERY DAY 90 tablet 3    modafinil (PROVIGIL) 100 MG tablet Take 1 tablet by mouth Daily. 30 tablet 3    VITAMIN D PO Take 1,000 Units by mouth Daily.      lisinopril-hydrochlorothiazide (PRINZIDE,ZESTORETIC) 20-12.5 MG per tablet TAKE 1 TABLET BY MOUTH EVERY DAY 90 tablet 3     No facility-administered medications prior to visit.       No opioid medication identified on active medication list. I have reviewed chart for other potential  high risk medication/s and harmful drug interactions in the elderly.        Aspirin is on active medication list. Aspirin use is indicated based on review of current medical condition/s. Pros and cons of this therapy have been discussed today. Benefits of this medication outweigh potential harm.  Patient has been encouraged to continue taking this  "medication.  .      Patient Active Problem List   Diagnosis    Essential hypertension    Depression    Prediabetes    Carpal tunnel syndrome of right wrist    Medicare annual wellness visit, subsequent    Gastroesophageal reflux disease without esophagitis    Need for diphtheria-tetanus-pertussis (Tdap) vaccine    Post-menopausal    Bilateral primary osteoarthritis of knee    Chronic pain of both knees    Postmenopausal    Acquired hypothyroidism    Antiphospholipid antibody positive    Primary osteoarthritis of right knee    ANDREIA on CPAP    Obesity    S/P total knee arthroplasty, right    Elevated partial thromboplastin time (PTT)    Acute blood loss anemia, mild, asymptomatic     Advance Care Planning   Advance Care Planning     Advance Directive is on file.  ACP discussion was held with the patient during this visit. Patient has an advance directive in EMR which is still valid.      Objective    Vitals:    03/12/24 1308   BP: 150/90   BP Location: Left arm   Patient Position: Sitting   Cuff Size: Adult   Pulse: 88   Resp: 19   Temp: 97.8 °F (36.6 °C)   TempSrc: Infrared   SpO2: 97%   Weight: 88.6 kg (195 lb 6.4 oz)   Height: 160 cm (63\")     Estimated body mass index is 34.61 kg/m² as calculated from the following:    Height as of this encounter: 160 cm (63\").    Weight as of this encounter: 88.6 kg (195 lb 6.4 oz).           Does the patient have evidence of cognitive impairment? No    Lab Results   Component Value Date    TRIG 231 (H) 03/12/2024    HDL 41 03/12/2024     (H) 03/12/2024    VLDL 41 (H) 03/12/2024    HGBA1C 6.60 (H) 03/12/2024        HEALTH RISK ASSESSMENT    Smoking Status:  Social History     Tobacco Use   Smoking Status Never    Passive exposure: Never   Smokeless Tobacco Never     Alcohol Consumption:  Social History     Substance and Sexual Activity   Alcohol Use No     Fall Risk Screen:    STEADI Fall Risk Assessment was completed, and patient is at LOW risk for falls.Assessment " completed on:3/12/2024    Depression Screening:      3/12/2024     1:10 PM   PHQ-2/PHQ-9 Depression Screening   Little Interest or Pleasure in Doing Things 0-->not at all   Feeling Down, Depressed or Hopeless 0-->not at all   PHQ-9: Brief Depression Severity Measure Score 0       Health Habits and Functional and Cognitive Screening:      3/12/2024     1:11 PM   Functional & Cognitive Status   Do you have difficulty preparing food and eating? No   Do you have difficulty bathing yourself, getting dressed or grooming yourself? No   Do you have difficulty using the toilet? No   Do you have difficulty moving around from place to place? No   Do you have trouble with steps or getting out of a bed or a chair? No   Current Diet Unhealthy Diet   Dental Exam Not up to date   Eye Exam Up to date   Exercise (times per week) 5 times per week   Current Exercises Include House Cleaning   Do you need help using the phone?  No   Are you deaf or do you have serious difficulty hearing?  Yes   Do you need help to go to places out of walking distance? No   Do you need help shopping? No   Do you need help preparing meals?  No   Do you need help with housework?  No   Do you need help with laundry? No   Do you need help taking your medications? No   Do you need help managing money? No   Do you ever drive or ride in a car without wearing a seat belt? No   Have you felt unusual stress, anger or loneliness in the last month? Yes   Who do you live with? Spouse   If you need help, do you have trouble finding someone available to you? No   Have you been bothered in the last four weeks by sexual problems? No   Do you have difficulty concentrating, remembering or making decisions? No       Age-appropriate Screening Schedule:  Refer to the list below for future screening recommendations based on patient's age, sex and/or medical conditions. Orders for these recommended tests are listed in the plan section. The patient has been provided with a  "written plan.    Health Maintenance   Topic Date Due    RSV Vaccine - Adults (1 - 1-dose 60+ series) Never done    DXA SCAN  08/11/2022    INFLUENZA VACCINE  03/31/2024 (Originally 8/1/2023)    COVID-19 Vaccine (4 - 2023-24 season) 05/06/2024 (Originally 9/1/2023)    BMI FOLLOWUP  07/26/2024    MAMMOGRAM  11/01/2024    ANNUAL WELLNESS VISIT  03/12/2025    PAP SMEAR  03/10/2026    TDAP/TD VACCINES (5 - Td or Tdap) 11/22/2030    COLORECTAL CANCER SCREENING  04/01/2032    HEPATITIS C SCREENING  Completed    Pneumococcal Vaccine 65+  Completed    ZOSTER VACCINE  Addressed                  CMS Preventative Services Quick Reference  Risk Factors Identified During Encounter  Depression/Dysphoria: Current medication is effective, no change recommended  Hearing Problem:  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  The above risks/problems have been discussed with the patient.  Pertinent information has been shared with the patient in the After Visit Summary.  An After Visit Summary and PPPS were made available to the patient.    Follow Up:   Next Medicare Wellness visit to be scheduled in 1 year.       Additional E&M Note during same encounter follows:  Patient has multiple medical problems which are significant and separately identifiable that require additional work above and beyond the Medicare Wellness Visit.      Chief Complaint  Medicare Wellness-subsequent    Subjective        HPI  Theodoris JENNY Mendoza is also being seen today for we will increase her lisinopril hydrochlorothiazide have her take 2 pills until she loses weight get the blood pressure down some continue to monitor blood pressure at home.         Objective   Vital Signs:  /90 (BP Location: Left arm, Patient Position: Sitting, Cuff Size: Adult)   Pulse 88   Temp 97.8 °F (36.6 °C) (Infrared)   Resp 19   Ht 160 cm (63\")   Wt 88.6 kg (195 lb 6.4 oz)   SpO2 97%   BMI 34.61 kg/m²     Physical Exam  Vitals and nursing note " reviewed.   Constitutional:       Appearance: She is well-developed.   HENT:      Head: Normocephalic and atraumatic.   Eyes:      General:         Right eye: No discharge.         Left eye: No discharge.      Pupils: Pupils are equal, round, and reactive to light.   Cardiovascular:      Rate and Rhythm: Normal rate and regular rhythm.      Heart sounds: Normal heart sounds.   Pulmonary:      Effort: Pulmonary effort is normal.      Breath sounds: Normal breath sounds.   Abdominal:      General: Bowel sounds are normal.      Palpations: Abdomen is soft. There is no mass.      Tenderness: There is no abdominal tenderness.   Musculoskeletal:         General: Normal range of motion.      Right shoulder: No swelling.      Cervical back: Normal range of motion and neck supple.   Skin:     General: Skin is warm and dry.      Nails: There is no clubbing.   Neurological:      Mental Status: She is alert and oriented to person, place, and time.      Deep Tendon Reflexes: Reflexes are normal and symmetric.   Psychiatric:         Behavior: Behavior normal.         Thought Content: Thought content normal.         Judgment: Judgment normal.                         Assessment and Plan   Diagnoses and all orders for this visit:    1. Medicare annual wellness visit, subsequent (Primary)    2. Acquired hypothyroidism  -     TSH; Future    3. Class 1 obesity due to excess calories without serious comorbidity with body mass index (BMI) of 34.0 to 34.9 in adult  -     CBC & Differential; Future  -     Comprehensive Metabolic Panel; Future  -     Lipid Panel; Future    4. Prediabetes  -     Hemoglobin A1c; Future    5. Essential hypertension  -     lisinopril-hydrochlorothiazide (PRINZIDE,ZESTORETIC) 20-12.5 MG per tablet; Take 2 tablets by mouth Daily for 360 days.  Dispense: 180 tablet; Refill: 3             Follow Up   Return in about 6 months (around 9/12/2024) for Recheck.  Patient was given instructions and counseling regarding  her condition or for health maintenance advice. Please see specific information pulled into the AVS if appropriate.

## 2024-03-13 LAB
HBA1C MFR BLD: 6.6 % (ref 4.8–5.6)
TSH SERPL DL<=0.05 MIU/L-ACNC: 3.98 UIU/ML (ref 0.27–4.2)

## 2024-03-13 NOTE — PATIENT INSTRUCTIONS
Medicare Wellness  Personal Prevention Plan of Service     Date of Office Visit:    Encounter Provider:  Indigo Paul MD  Place of Service:  [facility]  Patient Name: Darrion Mendoza  :  1955    As part of the Medicare Wellness portion of your visit today, we are providing you with this personalized preventive plan of services (PPPS). This plan is based upon recommendations of the [place] Preventive Services Task Force (USPSTF) and the Advisory Committee on Immunization Practices (ACIP).    This lists the preventive care services that should be considered, and provides dates of when you are due. Items listed as completed are up-to-date and do not require any further intervention.    Health Maintenance   Topic Date Due    RSV Vaccine - Adults (1 - 1-dose 60+ series) Never done    DXA SCAN  2022    INFLUENZA VACCINE  2024 (Originally 2023)    COVID-19 Vaccine ( - -24 season) 2024 (Originally 2023)    BMI FOLLOWUP  2024    MAMMOGRAM  2024    ANNUAL WELLNESS VISIT  2025    PAP SMEAR  03/10/2026    TDAP/TD VACCINES (5 - Td or Tdap) 2030    COLORECTAL CANCER SCREENING  2032    HEPATITIS C SCREENING  Completed    Pneumococcal Vaccine 65+  Completed    ZOSTER VACCINE  Addressed       Orders Placed This Encounter   Procedures    Comprehensive Metabolic Panel     Standing Status:   Future     Number of Occurrences:   1     Order Specific Question:   Release to patient     Answer:   Routine Release [[id number]]    TSH     Standing Status:   Future     Number of Occurrences:   1     Order Specific Question:   Release to patient     Answer:   Routine Release [[id number]]    Hemoglobin A1c     Standing Status:   Future     Number of Occurrences:   1     Order Specific Question:   Release to patient     Answer:   Routine Release [[id number]]    Lipid Panel     Standing Status:   Future     Number of Occurrences:    1     Order Specific Question:   Release to patient     Answer:   Routine Release [[id number]]    CBC & Differential     Standing Status:   Future     Number of Occurrences:   1     Order Specific Question:   Manual Differential     Answer:   No     Order Specific Question:   Release to patient     Answer:   Routine Release [[id number]]       No follow-ups on file.

## 2024-05-01 ENCOUNTER — HOSPITAL ENCOUNTER (OUTPATIENT)
Dept: MAMMOGRAPHY | Facility: HOSPITAL | Age: 69
Discharge: HOME | End: 2024-05-01
Admitting: FAMILY MEDICINE
Payer: MEDICARE

## 2024-05-01 DIAGNOSIS — Z12.31 VISIT FOR SCREENING MAMMOGRAM: ICD-10-CM

## 2024-05-01 PROCEDURE — 77063 BREAST TOMOSYNTHESIS BI: CPT

## 2024-05-01 PROCEDURE — 77067 SCR MAMMO BI INCL CAD: CPT

## 2024-07-05 NOTE — PROGRESS NOTES
Sleep Clinic Follow Up Note    Chief Complaint  Sleep Apnea (Follow up//)    Subjective     History of Present Illness (from previous encounter on 1/2/2024):  Darrion Mendoza is a 68 y.o. female who returns for follow-up and compliance of PAP therapy.  The Pap report has been reviewed.  Overall usage is at 100% with compliance at 97%.  Patient averages 7 hours 1 minute.  Sleep apnea is well-controlled with an AHI of 1.1/H.I will refill the patient's supplies, and I have asked them to return for follow-up and compliance in 1 year or sooner should they have further questions or concerns.      With regard to modafinil, the patient is due for urine drug screen and medication contract.  We will obtain these today.  Otherwise, she is doing well with the medication and wishes to continue.  Patient reports that she still can fall asleep during the day but does not feel the need to do so.  She requests a 90-day supply.  Tyrone has been reviewed. (End copied text).    Interval History:  Darrion Mendoza is a 68 y.o. female returns for follow up and compliance of PAP therapy. The patient was last seen on 1/2/2024 by me. Overall the patient feels good with regard to therapy. The device appears to be working appropriately. On average the patient sleeps 7.5 hours per night. The patient wakes 0-1 times per night.     The patient reports the following changes to their medical and medication history since they were last seen:  Increased BP medication      Further details are as follows:      Compton Scale is (out of 24): Total score: 13     Weight:  Current Weight:184 lb    Weight change in the last year:  loss: 10 lbs    The patient's relevant past medical, surgical, family, and social history reviewed and updated in Epic as appropriate.    PMH:    Past Medical History:   Diagnosis Date    Annual physical exam 01/13/2020    Arthritis     Depression     Disease of thyroid gland     Esophageal ring     GERD (gastroesophageal  reflux disease)     H/O LEEP     Hearing loss in left ear     HTN (hypertension)     Kidney stones     ANDREIA (obstructive sleep apnea)     wears cpap, setting 6-12    Prediabetes      Past Surgical History:   Procedure Laterality Date    BREAST BIOPSY Right 2006    CARPAL TUNNEL RELEASE Right 10/2023    CHOLECYSTECTOMY  2014    COLONOSCOPY  2011    ESOPHAGEAL DILATATION  2009    NEPHRECTOMY PARTIAL Right 2014    TOTAL KNEE ARTHROPLASTY Right 2022    Procedure: TOTAL KNEE ARTHROPLASTY WITH CORI ROBOT;  Surgeon: Matt Marie MD;  [facility]: [facility];  Service: Robotics - Ortho;  Laterality: Right;    UPPER GASTROINTESTINAL ENDOSCOPY       OB History          3    Para   3    Term   3            AB        Living             SAB        IAB        Ectopic        Molar        Multiple        Live Births                  No Known Allergies    MEDS:  Prior to Admission medications    Medication Sig Start Date End Date Taking? Authorizing Provider   aspirin 81 MG EC tablet Take 1 tablet by mouth Daily. 22   Indigo Paul MD   buPROPion XL (WELLBUTRIN XL) 300 MG 24 hr tablet Take 1 tablet by mouth Daily. 19   Wilma David APRN   levothyroxine (SYNTHROID, LEVOTHROID) 50 MCG tablet TAKE 1 TABLET BY MOUTH EVERY DAY 3/10/24   Indigo Paul MD   lisinopril-hydrochlorothiazide (PRINZIDE,ZESTORETIC) 20-12.5 MG per tablet Take 2 tablets by mouth Daily for 360 days. 3/12/24 3/7/25  Indigo Paul MD   modafinil (PROVIGIL) 100 MG tablet Take 1 tablet by mouth Daily. 24   Randell Ken MD   VITAMIN D PO Take 1,000 Units by mouth Daily.    Provider, MD Sharona         FH:  Family History   Problem Relation Age of Onset    Thyroid disease Mother     Obesity Mother     Atrial fibrillation Mother     Hypertension Mother     Thyroid disease Father     Emphysema Father     Obesity Father     Heart disease Father     Diabetes Father     Breast cancer Paternal Aunt     Leukemia  "Sister     Ovarian cancer Neg Hx        Objective   Vital Signs:  /88 (BP Location: Right arm, Patient Position: Sitting, Cuff Size: Large Adult)   Pulse 69   Temp 96.8 °F (36 °C) (Infrared)   Ht 160 cm (62.99\")   Wt 83.5 kg (184 lb)   SpO2 98%   BMI 32.60 kg/m²              Physical Exam  Vitals reviewed.   Constitutional:       Appearance: Normal appearance.   HENT:      Head: Normocephalic and atraumatic.      Nose: Nose normal.      Mouth/Throat:      Mouth: Mucous membranes are moist.   Cardiovascular:      Rate and Rhythm: Normal rate and regular rhythm.      Heart sounds: No murmur heard.     No friction rub. No gallop.   Pulmonary:      Effort: Pulmonary effort is normal. No respiratory distress.      Breath sounds: Normal breath sounds. No wheezing or rhonchi.   Neurological:      Mental Status: She is alert and oriented to person, place, and time.   Psychiatric:         Behavior: Behavior normal.               Result Review :           PAP Report:  AHI: 0.5/h  Days of Usage: 87/90 (97%)  Number of Days Greater than 4 hours: 80/90 (93%)  Average time (days used): 7 hours 4 minutes  95th Percentile Pressure: 10.8 cmH2O  95th percentile leaks: 0.9 L/min  Settings: Auto CPAP-6/12 cm H2O, EPR full-time, EPR level 1, response standard.       Assessment and Plan  Theyuliet Mendoza is a 68 y.o. female who returns for follow-up and compliance of PAP therapy.  The Pap report has been reviewed.  Overall usage is at 97% with compliance at 93%.  The patient averages 7 hours and 4 minutes of therapy.  Sleep apnea is well-controlled with an AHI of 0.5/H. I will refill the patient's supplies, and I have asked them to return for follow-up and compliance in 1 year or sooner should they have further questions or concerns.    Patient is currently prescribed modafinil 100 mg daily.  She has no complaints at this time, nor side effects.  Urine drug screen and medication contract are up-to-date as of 1/2/2024.  We " will refill the patient's medication and have asked her to return for follow-up in approximately 4-6 months for medication recheck.    Diagnoses and all orders for this visit:    1. ANDREIA on CPAP (Primary)  -     PAP Therapy    2. Hypersomnia with sleep apnea    3. High risk medication use           The patient continues to use and benefit from PAP therapy.    1. The patient was counseled regarding multimodal approach with healthy nutrition, healthy sleep, regular physical activity, social activities, counseling, and medications. Encouraged to practice lateral sleep position. Avoid alcohol and sedatives close to bedtime.     2.  We will refill supplies x1 year.  Return to clinic 1 year or sooner if symptoms warrant. I have reviewed the results of my evaluation and impression and discussed my recommendations in detail with the patient.           Follow Up  Return in about 6 months (around 1/9/2025) for Medication Check.  Patient was given instructions and counseling regarding her condition or for health maintenance advice. Please see specific information pulled into the AVS if appropriate.       JESIKA Frey, ACNP-BC  Pulmonology, Critical Care, and Sleep Medicine

## 2024-07-09 ENCOUNTER — OFFICE VISIT (OUTPATIENT)
Dept: SLEEP MEDICINE | Age: 69
End: 2024-07-09
Payer: MEDICARE

## 2024-07-09 VITALS
OXYGEN SATURATION: 98 % | SYSTOLIC BLOOD PRESSURE: 146 MMHG | WEIGHT: 184 LBS | BODY MASS INDEX: 32.6 KG/M2 | HEART RATE: 69 BPM | HEIGHT: 63 IN | TEMPERATURE: 96.8 F | DIASTOLIC BLOOD PRESSURE: 88 MMHG

## 2024-07-09 DIAGNOSIS — G47.33 OSA ON CPAP: Primary | ICD-10-CM

## 2024-07-09 DIAGNOSIS — G47.10 HYPERSOMNIA WITH SLEEP APNEA: ICD-10-CM

## 2024-07-09 DIAGNOSIS — G47.30 HYPERSOMNIA WITH SLEEP APNEA: ICD-10-CM

## 2024-07-09 DIAGNOSIS — Z79.899 HIGH RISK MEDICATION USE: ICD-10-CM

## 2024-07-09 PROCEDURE — 1160F RVW MEDS BY RX/DR IN RCRD: CPT | Performed by: NURSE PRACTITIONER

## 2024-07-09 PROCEDURE — 3077F SYST BP >= 140 MM HG: CPT | Performed by: NURSE PRACTITIONER

## 2024-07-09 PROCEDURE — 99213 OFFICE O/P EST LOW 20 MIN: CPT | Performed by: NURSE PRACTITIONER

## 2024-07-09 PROCEDURE — 1159F MED LIST DOCD IN RCRD: CPT | Performed by: NURSE PRACTITIONER

## 2024-07-09 PROCEDURE — 3079F DIAST BP 80-89 MM HG: CPT | Performed by: NURSE PRACTITIONER

## 2024-07-11 DIAGNOSIS — G47.10 HYPERSOMNOLENCE: ICD-10-CM

## 2024-07-11 RX ORDER — MODAFINIL 100 MG/1
100 TABLET ORAL DAILY
Qty: 30 TABLET | Refills: 2 | Status: SHIPPED | OUTPATIENT
Start: 2024-07-11

## 2024-10-10 DIAGNOSIS — G47.10 HYPERSOMNOLENCE: ICD-10-CM

## 2024-10-10 RX ORDER — MODAFINIL 100 MG/1
100 TABLET ORAL DAILY
Qty: 30 TABLET | Refills: 3 | Status: SHIPPED | OUTPATIENT
Start: 2024-10-10

## 2024-10-18 DIAGNOSIS — E03.9 HYPOTHYROIDISM, UNSPECIFIED TYPE: ICD-10-CM

## 2024-10-18 DIAGNOSIS — I10 ESSENTIAL HYPERTENSION: ICD-10-CM

## 2024-10-18 RX ORDER — LEVOTHYROXINE SODIUM 50 UG/1
50 TABLET ORAL DAILY
Qty: 90 TABLET | Refills: 0 | Status: SHIPPED | OUTPATIENT
Start: 2024-10-18

## 2024-10-21 RX ORDER — LISINOPRIL AND HYDROCHLOROTHIAZIDE 12.5; 2 MG/1; MG/1
2 TABLET ORAL DAILY
Qty: 180 TABLET | Refills: 3 | Status: SHIPPED | OUTPATIENT
Start: 2024-10-21 | End: 2025-10-16

## 2025-01-08 ENCOUNTER — OFFICE VISIT (OUTPATIENT)
Age: 70
End: 2025-01-08
Payer: MEDICARE

## 2025-01-08 VITALS
WEIGHT: 194.4 LBS | DIASTOLIC BLOOD PRESSURE: 98 MMHG | HEIGHT: 64 IN | BODY MASS INDEX: 33.19 KG/M2 | SYSTOLIC BLOOD PRESSURE: 140 MMHG

## 2025-01-08 DIAGNOSIS — M75.81 RIGHT ROTATOR CUFF TENDINITIS: ICD-10-CM

## 2025-01-08 DIAGNOSIS — M25.511 RIGHT SHOULDER PAIN, UNSPECIFIED CHRONICITY: Primary | ICD-10-CM

## 2025-01-08 DIAGNOSIS — M19.011 ARTHRITIS OF RIGHT GLENOHUMERAL JOINT: ICD-10-CM

## 2025-01-08 RX ORDER — MELOXICAM 7.5 MG/1
7.5 TABLET ORAL
Qty: 90 TABLET | Refills: 1 | Status: SHIPPED | OUTPATIENT
Start: 2025-01-08

## 2025-01-08 NOTE — PROGRESS NOTES
[facility] Office Visit     Office Visit       Date: 01/08/2025   Patient Name: Darrion Mendoza  MRN: [id number]  YOB: 1955    Referring Physician: Indigo Paul MD     Chief Complaint:   Chief Complaint   Patient presents with    Right Shoulder - Pain     History of Present Illness:   Darrion Mendoza is a 69 y.o. female who presents with new problem of: right shoulder pain.  Onset: atraumatic and gradual in nature. The issue has been ongoing for 4 month(s). Pain is a 2/10 on the pain scale. Pain is described as burning and stabbing. Associated symptoms include pain. The pain is worse with  certain movement ; resting and pain medication and/or NSAID improve the pain. Previous treatments have included: bracing and NSAIDS.    Subjective   Review of Systems: Review of Systems   Constitutional:  Negative for chills, fever, unexpected weight gain and unexpected weight loss.   HENT:  Negative for congestion, postnasal drip and rhinorrhea.    Eyes:  Negative for blurred vision.   Respiratory:  Negative for shortness of breath.    Cardiovascular:  Negative for leg swelling.   Gastrointestinal:  Negative for abdominal pain, nausea and vomiting.   Genitourinary:  Negative for difficulty urinating.   Musculoskeletal:  Positive for arthralgias. Negative for gait problem, joint swelling and myalgias.   Skin:  Negative for skin lesions and wound.   Neurological:  Negative for dizziness, weakness, light-headedness and numbness.   Hematological:  Does not bruise/bleed easily.   Psychiatric/Behavioral:  Negative for depressed mood.         I have reviewed the following portions of the patient's history:History of Present Illness and review of systems.    Past Medical History:   Past Medical History:   Diagnosis Date    Annual physical exam 01/13/2020    Arthritis     Depression     Disease of thyroid gland     Esophageal ring     GERD (gastroesophageal  reflux disease)     H/O LEEP     Hearing loss in left ear     HTN (hypertension)     Kidney stones     ANDREIA (obstructive sleep apnea)     wears cpap, setting 6-12    Prediabetes        Past Surgical History:   Past Surgical History:   Procedure Laterality Date    BREAST BIOPSY Right 2006    CARPAL TUNNEL RELEASE Right 10/2023    CHOLECYSTECTOMY  2014    COLONOSCOPY  2011    ESOPHAGEAL DILATATION  2009    NEPHRECTOMY PARTIAL Right 2014    TOTAL KNEE ARTHROPLASTY Right 05/05/2022    Procedure: TOTAL KNEE ARTHROPLASTY WITH CORI ROBOT;  Surgeon: Matt Marie MD;  Location: [facility];  Service: Robotics - Ortho;  Laterality: Right;    UPPER GASTROINTESTINAL ENDOSCOPY  2011       Family History:   Family History   Problem Relation Age of Onset    Thyroid disease Mother     Obesity Mother     Atrial fibrillation Mother     Hypertension Mother     Thyroid disease Father     Emphysema Father     Obesity Father     Heart disease Father     Diabetes Father     Breast cancer Paternal Aunt     Leukemia Sister     Ovarian cancer Neg Hx        Social History:   Social History     Socioeconomic History    Marital status:     Number of children: 4   Tobacco Use    Smoking status: Never     Passive exposure: Never    Smokeless tobacco: Never   Vaping Use    Vaping status: Never Used   Substance and Sexual Activity    Alcohol use: No    Drug use: No    Sexual activity: Yes     Partners: Male     Birth control/protection: None     Comment:  for 41 years       Medications:   Current Outpatient Medications:     aspirin 81 MG EC tablet, Take 1 tablet by mouth Daily., Disp: , Rfl:     buPROPion XL (WELLBUTRIN XL) 300 MG 24 hr tablet, Take 1 tablet by mouth Daily., Disp: 30 tablet, Rfl: 2    levothyroxine (SYNTHROID, LEVOTHROID) 50 MCG tablet, Take 1 tablet by mouth Daily., Disp: 90 tablet, Rfl: 0    lisinopril-hydrochlorothiazide (PRINZIDE,ZESTORETIC) 20-12.5 MG per tablet, Take 2 tablets by mouth Daily for 360 days.,  "Disp: 180 tablet, Rfl: 3    modafinil (PROVIGIL) 100 MG tablet, TAKE 1 TABLET BY MOUTH EVERY DAY, Disp: 30 tablet, Rfl: 3    meloxicam (MOBIC) 7.5 MG tablet, Take 1 tablet by mouth Daily With Breakfast., Disp: 90 tablet, Rfl: 1    Allergies: No Known Allergies    I reviewed the patient's chief complaint, history of present illness, review of systems, past medical history, surgical history, family history, social history, medications and allergy list.     Objective    Vital Signs:   Vitals:    01/08/25 0838   BP: 140/98   Weight: 88.2 kg (194 lb 6.4 oz)   Height: 162.6 cm (64\")     Body mass index is 33.37 kg/m².   BMI is >= 30 and <35. (Class 1 Obesity). The following options were offered after discussion;: exercise counseling/recommendations and nutrition counseling/recommendations     Patient reports that she is a non-smoker and has not ever been a smoker.  This behavior was applauded and she was encouraged to continue in smoking cessation.  We will continue to monitor at subsequent visits.    Ortho Exam:  Constitutional: General Appearance: healthy-appearing, NAD, and normal body habitus.   Psychiatric: Mood and Affect: normal mood and affect and active and alert.   Cardiovascular System: Arterial Pulses Right: radial normal. Arterial Pulses Left: radial normal.   C-Spine/Neck: Active Range of Motion: no crepitus or pain elicited on motion and flexion normal, extension normal, rotation normal, and lateral flexion normal.   Shoulders: Inspection Right: no misalignment, erythema, induration, swelling, or warmth and AC prominence normal. Inspection Left: no misalignment, erythema, induration, swelling, or warmth and AC prominence normal. Bony Palpation Right: tenderness of the acromioclavicular joint, the greater tuberosity, and the bicipital groove and no tenderness of the clavicle. Soft Tissue Palpation Right: tenderness of the supraspinatus, the infraspinatus, the glenohumeral joint region, and the lateral cuff " insertion. Active Range of Motion Right: limited. Special Tests Right: Hawkin's test positive and empty can sign positive.   exam RIGHT shoulder: forward flexion approximately 120 degrees. Abduction 120 degrees. Internal rotation SI. Motor testing supraspinatus 4/5  exam LEFT shoulder: forward flexion approximately 140 degrees. Abduction 140 degrees. Internal rotation L1. Motor testing supraspinatus 5/5    Results Review:   Imaging Results (Last 24 Hours)       Procedure Component Value Units Date/Time    XR Shoulder 2+ View Right [[id number]] Resulted: 01/08/25 0903     Updated: 01/08/25 0903    Narrative:      Indication: Right shoulder pain    Views:AP lateral and scapular Y views of the shoulder are submitted.    Impression: There is no fracture subluxation or dislocation. The   glenohumeral joint space is well reduced with large osteophytes. There are   no acute findings.  Degenerative changes noted in the glenohumeral joint   with small inferior humeral head osteophyte.  Chronic enthesiopathy of the   greater tuberosity.    Comparison: None.               Procedures    Assessment / Plan    Assessment/Plan:   Diagnoses and all orders for this visit:    1. Right shoulder pain, unspecified chronicity (Primary)  -     XR Shoulder 2+ View Right    2. Arthritis of right glenohumeral joint  -     meloxicam (MOBIC) 7.5 MG tablet; Take 1 tablet by mouth Daily With Breakfast.  Dispense: 90 tablet; Refill: 1    3. Right rotator cuff tendinitis    Patient presents today for evaluation of right shoulder pain.  She notes 2 instances where she internally rotated her shoulder and felt significant pain.  She localizes pain to the anterior and lateral shoulder.  Radiographs of the right shoulder do show degenerative changes of the glenohumeral joint.  Physical exam shows pain with special testing of the rotator cuff.  We reviewed her radiographic findings and discussed them in detail.  I will start her on meloxicam to help with  pain.  I gave her series of home exercises to strengthen the rotator cuff.  I will see her back in 1 month to monitor response and decide on additional treatment.    Previous laboratory results reviewed: 3/12/2024-hemoglobin A1c 6.60%.  TSH 3.980.  eGFR 55.4.    Follow Up:   Return in about 1 month (around 2/8/2025) for Recheck.      Malachi Santacruz MD  [facility]

## 2025-01-27 ENCOUNTER — OFFICE VISIT (OUTPATIENT)
Dept: FAMILY MEDICINE CLINIC | Facility: CLINIC | Age: 70
End: 2025-01-27
Payer: MEDICARE

## 2025-01-27 VITALS
DIASTOLIC BLOOD PRESSURE: 94 MMHG | BODY MASS INDEX: 32.81 KG/M2 | WEIGHT: 192.2 LBS | HEIGHT: 64 IN | SYSTOLIC BLOOD PRESSURE: 136 MMHG | TEMPERATURE: 98.7 F | HEART RATE: 78 BPM | OXYGEN SATURATION: 98 %

## 2025-01-27 DIAGNOSIS — R76.0 ANTIPHOSPHOLIPID ANTIBODY POSITIVE: Chronic | ICD-10-CM

## 2025-01-27 DIAGNOSIS — R60.0 EDEMA OF LEFT LOWER EXTREMITY: Primary | ICD-10-CM

## 2025-01-27 DIAGNOSIS — M25.472 LEFT ANKLE SWELLING: ICD-10-CM

## 2025-01-27 PROCEDURE — 1160F RVW MEDS BY RX/DR IN RCRD: CPT | Performed by: FAMILY MEDICINE

## 2025-01-27 PROCEDURE — 3080F DIAST BP >= 90 MM HG: CPT | Performed by: FAMILY MEDICINE

## 2025-01-27 PROCEDURE — G2211 COMPLEX E/M VISIT ADD ON: HCPCS | Performed by: FAMILY MEDICINE

## 2025-01-27 PROCEDURE — 99213 OFFICE O/P EST LOW 20 MIN: CPT | Performed by: FAMILY MEDICINE

## 2025-01-27 PROCEDURE — 3075F SYST BP GE 130 - 139MM HG: CPT | Performed by: FAMILY MEDICINE

## 2025-01-27 PROCEDURE — 1159F MED LIST DOCD IN RCRD: CPT | Performed by: FAMILY MEDICINE

## 2025-01-27 PROCEDURE — 1126F AMNT PAIN NOTED NONE PRSNT: CPT | Performed by: FAMILY MEDICINE

## 2025-01-27 NOTE — PROGRESS NOTES
"Subjective   Theodoris JENNY Mendoza is a 69 y.o. female.     Joint Swelling   she has been on her legs more the last few days.  More swelling in left ankle.  No injury.  No fever no chills  no redness no soa.  No cp.  Not painful.     The following portions of the patient's history were reviewed and updated as appropriate: allergies, current medications, past family history, past medical history, past social history, past surgical history, and problem list.    Review of Systems   Musculoskeletal:  Positive for joint swelling.       Objective     Vitals:    01/27/25 1557   BP: 136/94   Pulse: 78   Temp: 98.7 °F (37.1 °C)   TempSrc: Infrared   SpO2: 98%   Weight: 87.2 kg (192 lb 3.2 oz)   Height: 162.6 cm (64.02\")       Physical Exam  Vitals and nursing note reviewed.   Constitutional:       General: She is not in acute distress.     Appearance: She is well-developed. She is not diaphoretic.   Eyes:      General: Lids are normal. No scleral icterus.  Neck:      Thyroid: No thyroid mass or thyromegaly.      Vascular: No JVD.      Trachea: No tracheal deviation.   Cardiovascular:      Rate and Rhythm: Regular rhythm.   Pulmonary:      Effort: Pulmonary effort is normal.      Breath sounds: Normal breath sounds. No decreased breath sounds.   Musculoskeletal:         General: Swelling present.      Comments: Trace pitting edema left ankle larger than right.  No erythema.    2+ pedal pulses nate and post tibial.       Lymphadenopathy:      Upper Body:      Right upper body: No supraclavicular adenopathy.      Left upper body: No supraclavicular adenopathy.   Skin:     Findings: No bruising, erythema or rash.      Nails: There is no clubbing.   Neurological:      Mental Status: She is alert.      Deep Tendon Reflexes: Reflexes are normal and symmetric.   Psychiatric:         Speech: Speech normal.         Behavior: Behavior normal.         Thought Content: Thought content normal.         Judgment: Judgment normal. "         Assessment & Plan     Problem List Items Addressed This Visit          Coag and Thromboembolic    Antiphospholipid antibody positive (Chronic)       Musculoskeletal and Injuries    Left ankle swelling     Other Visit Diagnoses       Edema of left lower extremity    -  Primary    Relevant Orders    Duplex Venous Lower Extremity - Left CAR          We will monitor closely.  No pain neg holmans neg pain with flexion.  No calf tenderness no redness.  Slight pitting edema left slightly more than right.      Check venous duplex given Anti phos lipid antibody positive.      Elevate    No soa. Trace pitting edema to mid calf.

## 2025-01-30 ENCOUNTER — HOSPITAL ENCOUNTER (OUTPATIENT)
Dept: CARDIOLOGY | Facility: HOSPITAL | Age: 70
Discharge: HOME | End: 2025-01-30
Admitting: FAMILY MEDICINE
Payer: MEDICARE

## 2025-01-30 VITALS — WEIGHT: 192.24 LBS | BODY MASS INDEX: 32.82 KG/M2 | HEIGHT: 64 IN

## 2025-01-30 DIAGNOSIS — R60.0 EDEMA OF LEFT LOWER EXTREMITY: ICD-10-CM

## 2025-01-30 LAB
BH CV LOWER VASCULAR LEFT COMMON FEMORAL AUGMENT: NORMAL
BH CV LOWER VASCULAR LEFT COMMON FEMORAL COMPRESS: NORMAL
BH CV LOWER VASCULAR LEFT COMMON FEMORAL PHASIC: NORMAL
BH CV LOWER VASCULAR LEFT COMMON FEMORAL SPONT: NORMAL
BH CV LOWER VASCULAR LEFT DISTAL FEMORAL AUGMENT: NORMAL
BH CV LOWER VASCULAR LEFT DISTAL FEMORAL COMPRESS: NORMAL
BH CV LOWER VASCULAR LEFT DISTAL FEMORAL PHASIC: NORMAL
BH CV LOWER VASCULAR LEFT DISTAL FEMORAL SPONT: NORMAL
BH CV LOWER VASCULAR LEFT GASTRONEMIUS COMPRESS: NORMAL
BH CV LOWER VASCULAR LEFT GREATER SAPH AK COMPRESS: NORMAL
BH CV LOWER VASCULAR LEFT LESSER SAPH COMPRESS: NORMAL
BH CV LOWER VASCULAR LEFT MID FEMORAL AUGMENT: NORMAL
BH CV LOWER VASCULAR LEFT MID FEMORAL COMPRESS: NORMAL
BH CV LOWER VASCULAR LEFT MID FEMORAL PHASIC: NORMAL
BH CV LOWER VASCULAR LEFT MID FEMORAL SPONT: NORMAL
BH CV LOWER VASCULAR LEFT PERONEAL COMPRESS: NORMAL
BH CV LOWER VASCULAR LEFT POPLITEAL AUGMENT: NORMAL
BH CV LOWER VASCULAR LEFT POPLITEAL COMPRESS: NORMAL
BH CV LOWER VASCULAR LEFT POPLITEAL PHASIC: NORMAL
BH CV LOWER VASCULAR LEFT POPLITEAL SPONT: NORMAL
BH CV LOWER VASCULAR LEFT POSTERIOR TIBIAL COMPRESS: NORMAL
BH CV LOWER VASCULAR LEFT PROFUNDA FEMORAL PHASIC: NORMAL
BH CV LOWER VASCULAR LEFT PROFUNDA FEMORAL SPONT: NORMAL
BH CV LOWER VASCULAR LEFT PROXIMAL FEMORAL AUGMENT: NORMAL
BH CV LOWER VASCULAR LEFT PROXIMAL FEMORAL COMPRESS: NORMAL
BH CV LOWER VASCULAR LEFT PROXIMAL FEMORAL PHASIC: NORMAL
BH CV LOWER VASCULAR LEFT PROXIMAL FEMORAL SPONT: NORMAL
BH CV LOWER VASCULAR LEFT SAPHENOFEMORAL JUNCTION AUGMENT: NORMAL
BH CV LOWER VASCULAR LEFT SAPHENOFEMORAL JUNCTION COMPRESS: NORMAL
BH CV LOWER VASCULAR LEFT SAPHENOFEMORAL JUNCTION PHASIC: NORMAL
BH CV LOWER VASCULAR LEFT SAPHENOFEMORAL JUNCTION SPONT: NORMAL
BH CV LOWER VASCULAR RIGHT COMMON FEMORAL AUGMENT: NORMAL
BH CV LOWER VASCULAR RIGHT COMMON FEMORAL PHASIC: NORMAL
BH CV LOWER VASCULAR RIGHT COMMON FEMORAL SPONT: NORMAL
BH CV POP FLUID COLLECT LEFT: 1

## 2025-01-30 PROCEDURE — 93971 EXTREMITY STUDY: CPT

## 2025-02-03 NOTE — PROGRESS NOTES
Notify the patient that the venous duplex did not show any signs of a deep vein thrombosis or inflammation in the veins.  She does have a Baker's cyst behind the left knee.  This may be what is contributing to some of the unilateral swelling.

## 2025-02-05 NOTE — PROGRESS NOTES
Sleep Clinic Follow Up Note    Chief Complaint  Follow-up, Sleep Apnea, and Med Management    Subjective     History of Present Illness (from previous encounter on 7/9/2024):  Darrion Mendoza is a 68 y.o. female who returns for follow-up and compliance of PAP therapy.  The Pap report has been reviewed.  Overall usage is at 97% with compliance at 93%.  The patient averages 7 hours and 4 minutes of therapy.  Sleep apnea is well-controlled with an AHI of 0.5/H. I will refill the patient's supplies, and I have asked them to return for follow-up and compliance in 1 year or sooner should they have further questions or concerns.     Patient is currently prescribed modafinil 100 mg daily.  She has no complaints at this time, nor side effects.  Urine drug screen and medication contract are up-to-date as of 1/2/2024.  We will refill the patient's medication and have asked her to return for follow-up in approximately 4-6 months for medication recheck.(End copied text).    Interval History:  Darrion Mendoza is a 69 y.o. female returns for follow up and compliance of PAP therapy. The patient was last seen on 7/9/2024 by me. Overall the patient feels good with regard to therapy. The device appears to be working appropriately. On average the patient sleeps 7-7.5 hours per night. The patient wakes 0-1 times per night.     The patient reports the following changes to their medical and medication history since they were last seen:  None    Further details are as follows:      [place] Scale is (out of 24): Total score: 15     Weight:  Current Weight: 190 lb    Weight change in the last year:  gain: 0 lbs    The patient's relevant past medical, surgical, family, and social history reviewed and updated in Epic as appropriate.    PMH:    Past Medical History:   Diagnosis Date    Annual physical exam 01/13/2020    Arthritis     Depression     Disease of thyroid gland     Esophageal ring     GERD (gastroesophageal reflux disease)      H/O LEEP     Hearing loss in left ear     HTN (hypertension)     Kidney stones     ANDREIA (obstructive sleep apnea)     wears cpap, setting 6-12    Prediabetes      Past Surgical History:   Procedure Laterality Date    BREAST BIOPSY Right 2006    CARPAL TUNNEL RELEASE Right 10/2023    CHOLECYSTECTOMY  2014    COLONOSCOPY  2011    ESOPHAGEAL DILATATION  2009    NEPHRECTOMY PARTIAL Right 2014    TOTAL KNEE ARTHROPLASTY Right 2022    Procedure: TOTAL KNEE ARTHROPLASTY WITH CORI ROBOT;  Surgeon: Matt Marie MD;  Location: [facility];  Service: Robotics - Ortho;  Laterality: Right;    UPPER GASTROINTESTINAL ENDOSCOPY       OB History          3    Para   3    Term   3            AB        Living             SAB        IAB        Ectopic        Molar        Multiple        Live Births                  No Known Allergies    MEDS:  Prior to Admission medications    Medication Sig Start Date End Date Taking? Authorizing Provider   aspirin 81 MG EC tablet Take 1 tablet by mouth Daily. 22   Indigo Paul MD   buPROPion XL (WELLBUTRIN XL) 300 MG 24 hr tablet Take 1 tablet by mouth Daily. 19   Wilma David APRN   levothyroxine (SYNTHROID, LEVOTHROID) 50 MCG tablet Take 1 tablet by mouth Daily. 10/18/24   Indigo Paul MD   lisinopril-hydrochlorothiazide (PRINZIDE,ZESTORETIC) 20-12.5 MG per tablet Take 2 tablets by mouth Daily for 360 days. 10/21/24 10/16/25  Indigo Paul MD   modafinil (PROVIGIL) 100 MG tablet TAKE 1 TABLET BY MOUTH EVERY DAY 10/10/24   Chance Kowalski, APRN         FH:  Family History   Problem Relation Age of Onset    Thyroid disease Mother     Obesity Mother     Atrial fibrillation Mother     Hypertension Mother     Thyroid disease Father     Emphysema Father     Obesity Father     Heart disease Father     Diabetes Father     Breast cancer Paternal Aunt     Leukemia Sister     Ovarian cancer Neg Hx        Objective   Vital Signs:  /72   Pulse 63    "Temp 98.6 °F (37 °C) (Oral)   Ht 162.6 cm (64\")   Wt 86.2 kg (190 lb)   SpO2 97%   BMI 32.61 kg/m²     Patient's (Body mass index is 32.61 kg/m².) indicates that they are obese (BMI >30)          Physical Exam  Vitals reviewed.   Constitutional:       Appearance: Normal appearance.   HENT:      Head: Normocephalic and atraumatic.      Nose: Nose normal.      Mouth/Throat:      Mouth: Mucous membranes are moist.   Cardiovascular:      Rate and Rhythm: Normal rate and regular rhythm.      Heart sounds: No murmur heard.     No friction rub. No gallop.   Pulmonary:      Effort: Pulmonary effort is normal. No respiratory distress.      Breath sounds: Normal breath sounds. No wheezing or rhonchi.   Neurological:      Mental Status: She is alert and oriented to person, place, and time.   Psychiatric:         Behavior: Behavior normal.               Result Review :           PAP Report:  AHI: 1.0/h  Days of Usage: 88/90 (98%)  Number of Days Greater than 4 hours: 85/90 (94%)  Average time (days used): 7 hours 1 minute  95th Percentile Pressure: 11.3 cmH2O  95th percentile leaks: 3.4 L/min  Settings: Auto CPAP-6/12 cm H2O, EPR full-time, EPR level 1, response soft       Assessment and Plan  Darrion Mendoza is a 69 y.o. female who returns for follow-up and compliance of PAP therapy.  The Pap report has been reviewed.  Overall usage is at 98% with compliance at 94%.  The patient averages 7 hours and 1 minute of therapy.  Sleep apnea is well-controlled with an AHI of 1.0/h. I will refill the patient's supplies, and I have asked them to return for follow-up and compliance in 1 year or sooner should they have further questions or concerns.    The patient is prescribed modafinil 100 mg daily for hypersomnia with sleep apnea.  Last prescription was filled on 1/19/2025.  Patient is doing well with this medication and denies side effects.  Patient is due for urine drug screen, and medication contract.  We will obtain these " today.    Diagnoses and all orders for this visit:    1. ANDREIA on CPAP (Primary)  -     PAP Therapy    2. Hypersomnia with sleep apnea    3. High risk medication use  -     Urine Drug Screen - Urine, Clean Catch; Future           The patient continues to use and benefit from PAP therapy.    1. The patient was counseled regarding multimodal approach with healthy nutrition, healthy sleep, regular physical activity, social activities, counseling, and medications. Encouraged to practice lateral sleep position. Avoid alcohol and sedatives close to bedtime.     2.  We will refill supplies x1 year.  Return to clinic 1 year or sooner if symptoms warrant. I have reviewed the results of my evaluation and impression and discussed my recommendations in detail with the patient.           Follow Up  Return in about 4 months (around 6/10/2025) for Medication Check.  Patient was given instructions and counseling regarding her condition or for health maintenance advice. Please see specific information pulled into the AVS if appropriate.       JESIKA Frey, ACNP-BC  Pulmonology, Critical Care, and Sleep Medicine

## 2025-02-10 ENCOUNTER — OFFICE VISIT (OUTPATIENT)
Dept: SLEEP MEDICINE | Age: 70
End: 2025-02-10
Payer: MEDICARE

## 2025-02-10 ENCOUNTER — LAB (OUTPATIENT)
Facility: HOSPITAL | Age: 70
End: 2025-02-10
Payer: MEDICARE

## 2025-02-10 VITALS
HEIGHT: 64 IN | OXYGEN SATURATION: 97 % | BODY MASS INDEX: 32.44 KG/M2 | HEART RATE: 63 BPM | DIASTOLIC BLOOD PRESSURE: 72 MMHG | SYSTOLIC BLOOD PRESSURE: 120 MMHG | TEMPERATURE: 98.6 F | WEIGHT: 190 LBS

## 2025-02-10 DIAGNOSIS — G47.33 OSA ON CPAP: Primary | ICD-10-CM

## 2025-02-10 DIAGNOSIS — G47.10 HYPERSOMNIA WITH SLEEP APNEA: ICD-10-CM

## 2025-02-10 DIAGNOSIS — G47.30 HYPERSOMNIA WITH SLEEP APNEA: ICD-10-CM

## 2025-02-10 DIAGNOSIS — Z79.899 HIGH RISK MEDICATION USE: ICD-10-CM

## 2025-02-10 PROCEDURE — 80307 DRUG TEST PRSMV CHEM ANLYZR: CPT

## 2025-02-23 DIAGNOSIS — G47.10 HYPERSOMNOLENCE: ICD-10-CM

## 2025-02-24 RX ORDER — MODAFINIL 100 MG/1
100 TABLET ORAL DAILY
Qty: 30 TABLET | Refills: 3 | Status: SHIPPED | OUTPATIENT
Start: 2025-02-24

## 2025-04-03 DIAGNOSIS — E03.9 HYPOTHYROIDISM, UNSPECIFIED TYPE: ICD-10-CM

## 2025-04-03 RX ORDER — LEVOTHYROXINE SODIUM 50 UG/1
50 TABLET ORAL DAILY
Qty: 90 TABLET | Refills: 3 | Status: SHIPPED | OUTPATIENT
Start: 2025-04-03

## 2025-04-20 DIAGNOSIS — I10 ESSENTIAL HYPERTENSION: ICD-10-CM

## 2025-04-21 RX ORDER — LISINOPRIL AND HYDROCHLOROTHIAZIDE 12.5; 2 MG/1; MG/1
1 TABLET ORAL DAILY
Qty: 90 TABLET | Refills: 3 | Status: SHIPPED | OUTPATIENT
Start: 2025-04-21

## 2025-05-25 DIAGNOSIS — E03.9 HYPOTHYROIDISM, UNSPECIFIED TYPE: ICD-10-CM

## 2025-05-27 RX ORDER — LEVOTHYROXINE SODIUM 50 UG/1
50 TABLET ORAL DAILY
Qty: 90 TABLET | Refills: 3 | Status: SHIPPED | OUTPATIENT
Start: 2025-05-27

## 2025-06-05 NOTE — PROGRESS NOTES
Sleep Clinic Follow Up Note    Chief Complaint  Sleep Apnea, Med Management, and Daytime Sleepiness    Subjective     History of Present Illness (from previous encounter on 2/10/2025):  clint Mendoza is a 69 y.o. female who returns for follow-up and compliance of PAP therapy.  The Pap report has been reviewed.  Overall usage is at 98% with compliance at 94%.  The patient averages 7 hours and 1 minute of therapy.  Sleep apnea is well-controlled with an AHI of 1.0/h. I will refill the patient's supplies, and I have asked them to return for follow-up and compliance in 1 year or sooner should they have further questions or concerns.     The patient is prescribed modafinil 100 mg daily for hypersomnia with sleep apnea.  Last prescription was filled on 1/19/2025.  Patient is doing well with this medication and denies side effects.  Patient is due for urine drug screen, and medication contract.  We will obtain these today. (End copied text).    Interval History:  Darrion Mendoza is a 69 y.o. female returns for follow up and compliance of PAP therapy. The patient was last seen on 2/10/2025 by me. Overall the patient feels good with regard to therapy. The device appears to be working appropriately. On average the patient sleeps 7-7.5 hours per night. The patient wakes 0-1 times per night. Patient is doing well with pap and denies mediacation side effects    The patient reports the following changes to their medical and medication history since they were last seen:  None    Further details are as follows:      Darwin Scale is (out of 24): Total score: 13     Weight:  Current Weight: 191 lb      The patient's relevant past medical, surgical, family, and social history reviewed and updated in Epic as appropriate.    PMH:    Past Medical History:   Diagnosis Date    Annual physical exam 01/13/2020    Arthritis     Depression     Disease of thyroid gland     Esophageal ring     GERD (gastroesophageal reflux disease)      H/O LEEP     Hearing loss in left ear     HTN (hypertension)     Kidney stones     ANDREIA (obstructive sleep apnea)     wears cpap, setting 6-12    Prediabetes      Past Surgical History:   Procedure Laterality Date    BREAST BIOPSY Right 2006    CARPAL TUNNEL RELEASE Right 10/2023    CHOLECYSTECTOMY  2014    COLONOSCOPY  2011    ESOPHAGEAL DILATATION  2009    NEPHRECTOMY PARTIAL Right 2014    TOTAL KNEE ARTHROPLASTY Right 2022    Procedure: TOTAL KNEE ARTHROPLASTY WITH CORI ROBOT;  Surgeon: Matt Marie MD;  Location: [facility];  Service: Robotics - Ortho;  Laterality: Right;    UPPER GASTROINTESTINAL ENDOSCOPY       OB History          3    Para   3    Term   3            AB        Living             SAB        IAB        Ectopic        Molar        Multiple        Live Births                  No Known Allergies    MEDS:  Prior to Admission medications    Medication Sig Start Date End Date Taking? Authorizing Provider   aspirin 81 MG EC tablet Take 1 tablet by mouth Daily. 22   Indigo Paul MD   buPROPion XL (WELLBUTRIN XL) 300 MG 24 hr tablet Take 1 tablet by mouth Daily. 19   Wilma David APRN   levothyroxine (SYNTHROID, LEVOTHROID) 50 MCG tablet TAKE 1 TABLET BY MOUTH EVERY DAY 25   Indigo Paul MD   lisinopril-hydrochlorothiazide (PRINZIDE,ZESTORETIC) 20-12.5 MG per tablet TAKE 1 TABLET BY MOUTH EVERY DAY 25   Indigo Paul MD   modafinil (PROVIGIL) 100 MG tablet TAKE 1 TABLET BY MOUTH EVERY DAY 25   Chance Kowalski APRN         FH:  Family History   Problem Relation Age of Onset    Thyroid disease Mother     Obesity Mother     Atrial fibrillation Mother     Hypertension Mother     Thyroid disease Father     Emphysema Father     Obesity Father     Heart disease Father     Diabetes Father     Breast cancer Paternal Aunt     Leukemia Sister     Ovarian cancer Neg Hx        Objective   Vital Signs:  /70   Pulse 71   Temp 98.5 °F (36.9  "°C) (Infrared)   Ht 162.6 cm (64\")   Wt 86.6 kg (191 lb)   SpO2 96%   BMI 32.79 kg/m²     Patient's (Body mass index is 32.79 kg/m².) indicates that they are obese (BMI >30)          Physical Exam  Vitals reviewed.   Constitutional:       Appearance: Normal appearance.   HENT:      Head: Normocephalic and atraumatic.      Nose: Nose normal.      Mouth/Throat:      Mouth: Mucous membranes are moist.   Cardiovascular:      Rate and Rhythm: Normal rate and regular rhythm.      Heart sounds: No murmur heard.     No friction rub. No gallop.   Pulmonary:      Effort: Pulmonary effort is normal. No respiratory distress.      Breath sounds: Normal breath sounds. No wheezing or rhonchi.   Neurological:      Mental Status: She is alert and oriented to person, place, and time.   Psychiatric:         Behavior: Behavior normal.               Result Review :           PAP Report:  AHI: 0.8/h  Days of Usage: 30/30 (100%)  Number of Days Greater than 4 hours: 100%  Average time (days used): 7 hours 19 minutes  95th Percentile Pressure: 11.1 cmH2O  95th percentile leaks: 1.0 L/min  Settings: Auto CPAP-6/12 cm H2O, EPR full-time, EPR level 1, response soft       Assessment and Plan  Theodroseline Mendoza is a 69 y.o. female who returns for follow-up and compliance of PAP therapy.  The Pap report has been reviewed.  Overall usage and compliance are at 100%.  The patient averages 7 hours and 19 minutes of therapy.  Sleep apnea is well-controlled with an AHI of 0.8/H.    I will refill the patient's supplies, and I have asked them to return for follow-up and compliance in 1 year or sooner should they have further questions or concerns.    Patient is prescribed modafinil 100 mg daily for hypersomnia with sleep apnea.  Patient is doing well with this medication and denies side effects.  Tyrone has been reviewed.  Urine drug screen is up-to-date as of 2/10/2025.  Medication contract is due which we will obtain today.  Follow-up in " approximately 5 months for medication recheck.    Diagnoses and all orders for this visit:    1. ANDREIA on CPAP (Primary)  -     PAP Therapy    2. Hypersomnia with sleep apnea    3. Obesity (BMI 30.0-34.9)    4. High risk medication use         The patient continues to use and benefit from PAP therapy.    1. The patient was counseled regarding multimodal approach with healthy nutrition, healthy sleep, regular physical activity, social activities, counseling, and medications. Encouraged to practice lateral sleep position. Avoid alcohol and sedatives close to bedtime.     2.  We will refill supplies x1 year.  Return to clinic 1 year or sooner if symptoms warrant. I have reviewed the results of my evaluation and impression and discussed my recommendations in detail with the patient.           Follow Up  Return in about 6 months (around 12/10/2025) for Medication Check.  Patient was given instructions and counseling regarding her condition or for health maintenance advice. Please see specific information pulled into the AVS if appropriate.       JESIKA Frey, [facility]  Pulmonology, Critical Care, and Sleep Medicine

## 2025-06-10 ENCOUNTER — OFFICE VISIT (OUTPATIENT)
Dept: SLEEP MEDICINE | Age: 70
End: 2025-06-10
Payer: MEDICARE

## 2025-06-10 VITALS
HEART RATE: 71 BPM | BODY MASS INDEX: 32.61 KG/M2 | WEIGHT: 191 LBS | TEMPERATURE: 98.5 F | DIASTOLIC BLOOD PRESSURE: 70 MMHG | HEIGHT: 64 IN | SYSTOLIC BLOOD PRESSURE: 138 MMHG | OXYGEN SATURATION: 96 %

## 2025-06-10 DIAGNOSIS — G47.10 HYPERSOMNIA WITH SLEEP APNEA: ICD-10-CM

## 2025-06-10 DIAGNOSIS — E66.811 OBESITY (BMI 30.0-34.9): ICD-10-CM

## 2025-06-10 DIAGNOSIS — Z79.899 HIGH RISK MEDICATION USE: ICD-10-CM

## 2025-06-10 DIAGNOSIS — G47.33 OSA ON CPAP: Primary | ICD-10-CM

## 2025-06-10 DIAGNOSIS — G47.30 HYPERSOMNIA WITH SLEEP APNEA: ICD-10-CM

## 2025-06-14 DIAGNOSIS — G47.10 HYPERSOMNOLENCE: ICD-10-CM

## 2025-06-16 RX ORDER — MODAFINIL 100 MG/1
100 TABLET ORAL DAILY
Qty: 30 TABLET | Refills: 3 | Status: SHIPPED | OUTPATIENT
Start: 2025-06-16

## 2025-07-30 ENCOUNTER — OFFICE VISIT (OUTPATIENT)
Dept: ORTHOPEDIC SURGERY | Facility: CLINIC | Age: 70
End: 2025-07-30
Payer: MEDICARE

## 2025-07-30 VITALS
BODY MASS INDEX: 32.27 KG/M2 | SYSTOLIC BLOOD PRESSURE: 120 MMHG | HEIGHT: 64 IN | DIASTOLIC BLOOD PRESSURE: 82 MMHG | WEIGHT: 189 LBS

## 2025-07-30 DIAGNOSIS — M17.12 PRIMARY OSTEOARTHRITIS OF LEFT KNEE: Primary | ICD-10-CM

## 2025-07-30 RX ORDER — ROPIVACAINE HYDROCHLORIDE 5 MG/ML
4 INJECTION, SOLUTION EPIDURAL; INFILTRATION; PERINEURAL
Status: COMPLETED | OUTPATIENT
Start: 2025-07-30 | End: 2025-07-30

## 2025-07-30 RX ORDER — DEXAMETHASONE SODIUM PHOSPHATE 4 MG/ML
4 INJECTION, SOLUTION INTRA-ARTICULAR; INTRALESIONAL; INTRAMUSCULAR; INTRAVENOUS; SOFT TISSUE
Status: COMPLETED | OUTPATIENT
Start: 2025-07-30 | End: 2025-07-30

## 2025-07-30 RX ADMIN — ROPIVACAINE HYDROCHLORIDE 4 ML: 5 INJECTION, SOLUTION EPIDURAL; INFILTRATION; PERINEURAL at 08:48

## 2025-07-30 RX ADMIN — DEXAMETHASONE SODIUM PHOSPHATE 4 MG: 4 INJECTION, SOLUTION INTRA-ARTICULAR; INTRALESIONAL; INTRAMUSCULAR; INTRAVENOUS; SOFT TISSUE at 08:48

## 2025-07-30 NOTE — PROGRESS NOTES
Procedure   - Large Joint Arthrocentesis: L knee on 7/30/2025 8:48 AM  Indications: pain  Details: 18 G needle, superolateral approach  Medications: 4 mL ropivacaine 0.5 %; 4 mg dexAMETHasone 4 MG/ML  Aspirate: 7 mL clear and yellow  Outcome: tolerated well, no immediate complications  Procedure, treatment alternatives, risks and benefits explained, specific risks discussed. Consent was given by the patient. Immediately prior to procedure a time out was called to verify the correct patient, procedure, equipment, support staff and site/side marked as required. Patient was prepped and draped in the usual sterile fashion.

## 2025-07-30 NOTE — PROGRESS NOTES
[facility]opa[facility] Clinic Note    Subjective     Chief Complaint   Patient presents with    Left Knee - Pain        HPI    Darrion Mendoza is a 69 y.o. female who presents with new problem of: left knee pain.  Onset: Atraumatic onset. The issue has been ongoing for 2 day(s). Pain is a 7/10 on the pain scale. Pain is described as stabbing. Associated symptoms include pain and giving way/buckling. The pain is worse with walking; resting improve the pain. Previous treatments have included: nothing.  No previous injections.  She did start having symptoms about 3 months ago, with intermittent pain and giving way.  About 2 days ago her knee gave way, and she had more pain at that time, but that has improved since then.    I have reviewed the following portions of the patient's history and agree with: History of Present Illness and Review of Systems    Patient Active Problem List   Diagnosis    Essential hypertension    Depression    Prediabetes    Carpal tunnel syndrome of right wrist    Medicare annual wellness visit, subsequent    Gastroesophageal reflux disease without esophagitis    Need for diphtheria-tetanus-pertussis (Tdap) vaccine    Post-menopausal    Bilateral primary osteoarthritis of knee    Chronic pain of both knees    Postmenopausal    Acquired hypothyroidism    Antiphospholipid antibody positive    Primary osteoarthritis of right knee    ANDREIA on CPAP    Obesity    S/P total knee arthroplasty, right    Elevated partial thromboplastin time (PTT)    Acute blood loss anemia, mild, asymptomatic    Left ankle swelling     Past Medical History:   Diagnosis Date    Annual physical exam 01/13/2020    Arthritis     Depression     Disease of thyroid gland     Esophageal ring     GERD (gastroesophageal reflux disease)     H/O LEEP     Hearing loss in left ear     HTN (hypertension)     Kidney stones     ANDREIA (obstructive sleep apnea)     wears cpap, setting 6-12    Prediabetes       Past Surgical History:    Procedure Laterality Date    BREAST BIOPSY Right 2006    CARPAL TUNNEL RELEASE Right 10/2023    CHOLECYSTECTOMY  2014    COLONOSCOPY  2011    ESOPHAGEAL DILATATION  2009    NEPHRECTOMY PARTIAL Right 2014    TOTAL KNEE ARTHROPLASTY Right 05/05/2022    Procedure: TOTAL KNEE ARTHROPLASTY WITH CORI ROBOT;  Surgeon: Matt Marie MD;  Location: [facility];  Service: Robotics - Ortho;  Laterality: Right;    UPPER GASTROINTESTINAL ENDOSCOPY  2011      Family History   Problem Relation Age of Onset    Thyroid disease Mother     Obesity Mother     Atrial fibrillation Mother     Hypertension Mother     Thyroid disease Father     Emphysema Father     Obesity Father     Heart disease Father     Diabetes Father     Breast cancer Paternal Aunt     Leukemia Sister     Ovarian cancer Neg Hx      Social History     Socioeconomic History    Marital status:     Number of children: 4   Tobacco Use    Smoking status: Never     Passive exposure: Never    Smokeless tobacco: Never   Vaping Use    Vaping status: Never Used   Substance and Sexual Activity    Alcohol use: No    Drug use: No    Sexual activity: Yes     Partners: Male     Birth control/protection: None     Comment:  for 41 years      Current Outpatient Medications on File Prior to Visit   Medication Sig Dispense Refill    aspirin 81 MG EC tablet Take 1 tablet by mouth Daily.      buPROPion XL (WELLBUTRIN XL) 300 MG 24 hr tablet Take 1 tablet by mouth Daily. 30 tablet 2    levothyroxine (SYNTHROID, LEVOTHROID) 50 MCG tablet TAKE 1 TABLET BY MOUTH EVERY DAY 90 tablet 3    lisinopril-hydrochlorothiazide (PRINZIDE,ZESTORETIC) 20-12.5 MG per tablet TAKE 1 TABLET BY MOUTH EVERY DAY 90 tablet 3    modafinil (PROVIGIL) 100 MG tablet TAKE 1 TABLET BY MOUTH EVERY DAY 30 tablet 3     No current facility-administered medications on file prior to visit.      No Known Allergies     Review of Systems   Constitutional:  Negative for activity change, appetite change, chills,  "diaphoresis, fatigue, fever and unexpected weight change.   HENT:  Negative for congestion, dental problem, drooling, ear discharge, ear pain, facial swelling, hearing loss, mouth sores, nosebleeds, postnasal drip, rhinorrhea, sinus pressure, sneezing, sore throat, tinnitus, trouble swallowing and voice change.    Eyes:  Negative for photophobia, pain, discharge, redness, itching and visual disturbance.   Respiratory:  Negative for apnea, cough, choking, chest tightness, shortness of breath, wheezing and stridor.    Cardiovascular:  Negative for chest pain, palpitations and leg swelling.   Gastrointestinal:  Negative for abdominal distention, abdominal pain, anal bleeding, blood in stool, constipation, diarrhea, nausea, rectal pain and vomiting.   Endocrine: Negative for cold intolerance, heat intolerance, polydipsia, polyphagia and polyuria.   Genitourinary:  Negative for decreased urine volume, difficulty urinating, dysuria, enuresis, flank pain, frequency, genital sores, hematuria and urgency.   Musculoskeletal:  Positive for arthralgias. Negative for back pain, gait problem, joint swelling, myalgias, neck pain and neck stiffness.   Skin:  Negative for color change, pallor, rash and wound.   Allergic/Immunologic: Negative for environmental allergies, food allergies and immunocompromised state.   Neurological:  Negative for dizziness, tremors, seizures, syncope, facial asymmetry, speech difficulty, weakness, light-headedness, numbness and headaches.   Hematological:  Negative for adenopathy. Does not bruise/bleed easily.   Psychiatric/Behavioral:  Negative for agitation, behavioral problems, confusion, decreased concentration, dysphoric mood, hallucinations, self-injury, sleep disturbance and suicidal ideas. The patient is not nervous/anxious and is not hyperactive.         Objective      Physical Exam  /82   Ht 162.6 cm (64\")   Wt 85.7 kg (189 lb)   BMI 32.44 kg/m²     Body mass index is 32.44 kg/m².   "         General:   Mental Status:  Alert   Appearance: Cooperative, in no acute distress   Build and Nutrition: Nourished well-developed female   Orientation: Alert and oriented to person, place and time   Posture: Normal   Gait: Nonantalgic/normal    Integument:   Left knee: No skin lesions, no rash, no ecchymosis    Neurologic:   Sensation:    Left foot: Intact to light touch on the dorsal and plantar aspect   Motor:  Left lower extremity: 5/5 quadriceps, hamstrings, ankle dorsiflexors, and ankle plantar flexors  Vascular:   Left lower extremity: 2+ dorsalis pedis pulse, prompt capillary refill    Lower Extremities:   Left Knee:    Tenderness:  None    Effusion:  1+    Swelling:  None    Crepitus:  Positive    Atrophy:  None    Range of motion:  Extension: 0°       Flexion: 120°  Instability:  No varus laxity, no valgus laxity, negative anterior drawer  Deformities:  Varus      Imaging/Studies      Imaging Results (Last 24 Hours)       Procedure Component Value Units Date/Time    XR Knee 4+ View Left [[id number]] Resulted: 07/30/25 0832     Updated: 07/30/25 0832    Narrative:      Left Knee Radiographs  Indication: left knee pain  Views: Standing AP's and skiers of both knees, with lateral and sunrise   views of the left knee    Comparison: AP view only, 10/7/2020    Findings:    Bone-on-bone contact medial compartment, tricompartmental osteophytes,   varus alignment, no acute bony abnormalities.  No unusual bony features.    Advanced knee arthritis.  Worsening compared to the previous imaging.              Assessment and Plan     Diagnoses and all orders for this visit:    1. Primary osteoarthritis of left knee (Primary)  -     XR Knee 4+ View Left  -     - Large Joint Arthrocentesis: L knee  -     ropivacaine (NAROPIN) 0.5 % injection 4 mL  -     dexAMETHasone (DECADRON) injection 4 mg        1. Primary osteoarthritis of left knee          I reviewed my findings with the patient.  We discussed options for her  left knee today, she would like to proceed with an aspiration injection.  Ultimately she is a candidate for knee replacement surgery in the future if and when her symptoms warrant.  I will see her back in 4 months, but I will be happy to see her back sooner for any problems.  Of note, her right total knee arthroplasty continues to function well.    Procedure Note:  The potential benefits of performing a therapeutic left knee joint aspiration and injection, as well as potential risks (including, but not limited to infection, swelling, pain, bleeding, bruising, nerve/blood vessel damage, skin color changes, transient elevation in blood glucose levels, and fat atrophy) were discussed with the patient.  After informed consent, timeout procedure was performed, and the skin on the left knee was prepped with chlorhexidine soap and alcohol, after which ethyl chloride was applied to the skin at the injection site. Via the superolateral approach, 7 cc of clear straw-colored fluid was aspirated then 1ml of dexamethasone 4 mg/ml mixed with 4ml 0.5% ropivacaine plain was injected into the knee joint.  The patient tolerated the procedure well. There were no complications.  Band-Aid was applied to the injection site. Post-procedural instructions were given to the patient and/or their caregiver.      Return in about 4 months (around 11/30/2025).      Matt Marie MD  07/30/25  10:51 EDT      Dictated Utilizing Dragon Dictation

## 2025-08-06 ENCOUNTER — OFFICE VISIT (OUTPATIENT)
Dept: ORTHOPEDIC SURGERY | Facility: CLINIC | Age: 70
End: 2025-08-06
Payer: MEDICARE

## 2025-08-06 VITALS
HEIGHT: 64 IN | WEIGHT: 189 LBS | BODY MASS INDEX: 32.27 KG/M2 | SYSTOLIC BLOOD PRESSURE: 132 MMHG | DIASTOLIC BLOOD PRESSURE: 80 MMHG

## 2025-08-06 DIAGNOSIS — M17.12 PRIMARY OSTEOARTHRITIS OF LEFT KNEE: Primary | ICD-10-CM

## 2025-08-06 PROCEDURE — 3075F SYST BP GE 130 - 139MM HG: CPT | Performed by: ORTHOPAEDIC SURGERY

## 2025-08-06 PROCEDURE — 3079F DIAST BP 80-89 MM HG: CPT | Performed by: ORTHOPAEDIC SURGERY

## 2025-08-06 PROCEDURE — 1159F MED LIST DOCD IN RCRD: CPT | Performed by: ORTHOPAEDIC SURGERY

## 2025-08-06 PROCEDURE — 1160F RVW MEDS BY RX/DR IN RCRD: CPT | Performed by: ORTHOPAEDIC SURGERY

## 2025-08-06 PROCEDURE — 99213 OFFICE O/P EST LOW 20 MIN: CPT | Performed by: ORTHOPAEDIC SURGERY

## 2025-08-08 ENCOUNTER — TRANSCRIBE ORDERS (OUTPATIENT)
Dept: ADMINISTRATIVE | Facility: HOSPITAL | Age: 70
End: 2025-08-08
Payer: MEDICARE

## 2025-08-08 DIAGNOSIS — Z12.31 VISIT FOR SCREENING MAMMOGRAM: Primary | ICD-10-CM

## (undated) DEVICE — PAD ARMBRD SURG CONVOL 7.5X20X2IN

## (undated) DEVICE — ADHS SKIN PREMIERPRO EXOFIN TOPICAL HI/VISC .5ML

## (undated) DEVICE — DRSNG PAD ABD 8X10IN STRL

## (undated) DEVICE — NDL HYPO ECLPS SFTY 18G 1 1/2IN

## (undated) DEVICE — STRYKER PERFORMANCE SERIES SAGITTAL BLADE: Brand: STRYKER PERFORMANCE SERIES

## (undated) DEVICE — SHEET,DRAPE,40X58,STERILE: Brand: MEDLINE

## (undated) DEVICE — PUMP PAIN AUTOFUSER AUTO SELCT NOBOLUS 1TO14ML/HR 550ML DISP

## (undated) DEVICE — UNDERCAST PADDING: Brand: DEROYAL

## (undated) DEVICE — BLANKT WARM UPPR/BDY ARM/OUT 57X196CM

## (undated) DEVICE — Device

## (undated) DEVICE — TRY EPID SFTY 18G 3.5IN 1T7680

## (undated) DEVICE — PK KN TOTL 10

## (undated) DEVICE — BNDG ELAS W/CLIP 6IN 10YD LF STRL

## (undated) DEVICE — ANTIBACTERIAL UNDYED BRAIDED (POLYGLACTIN 910), SYNTHETIC ABSORBABLE SUTURE: Brand: COATED VICRYL

## (undated) DEVICE — SYS CLS SKIN PREMIERPRO EXOFINFUSION 22CM

## (undated) DEVICE — GLV SURG SENSICARE W/ALOE PF LF 9 STRL

## (undated) DEVICE — TBG PENCL TELESCP MEGADYNE SMOKE EVAC 10FT

## (undated) DEVICE — SUT MONOCRYL PLS ANTIB UND 3/0  PS1 27IN

## (undated) DEVICE — TRAP FLD MINIVAC MEGADYNE 100ML

## (undated) DEVICE — CEMENT MIXING SYSTEM WITH MIS FEMORAL BREAKAWAY NOZZLE: Brand: REVOLUTION

## (undated) DEVICE — GLV SURG PREMIERPRO MIC LTX PF SZ8.5 BRN

## (undated) DEVICE — CVR FTSWITCH UNIV

## (undated) DEVICE — PATIENT RETURN ELECTRODE, SINGLE-USE, CONTACT QUALITY MONITORING, ADULT, WITH 9FT CORD, FOR PATIENTS WEIGING OVER 33LBS. (15KG): Brand: MEGADYNE